# Patient Record
Sex: FEMALE | Race: WHITE | Employment: FULL TIME | URBAN - NONMETROPOLITAN AREA
[De-identification: names, ages, dates, MRNs, and addresses within clinical notes are randomized per-mention and may not be internally consistent; named-entity substitution may affect disease eponyms.]

---

## 2021-06-15 ENCOUNTER — APPOINTMENT (OUTPATIENT)
Dept: GENERAL RADIOLOGY | Age: 36
End: 2021-06-15
Payer: COMMERCIAL

## 2021-06-15 ENCOUNTER — HOSPITAL ENCOUNTER (EMERGENCY)
Age: 36
Discharge: HOME | End: 2021-06-15
Attending: EMERGENCY MEDICINE
Payer: COMMERCIAL

## 2021-06-15 VITALS
OXYGEN SATURATION: 98 % | WEIGHT: 255 LBS | HEART RATE: 89 BPM | HEIGHT: 69 IN | BODY MASS INDEX: 37.77 KG/M2 | TEMPERATURE: 98.1 F | DIASTOLIC BLOOD PRESSURE: 61 MMHG | SYSTOLIC BLOOD PRESSURE: 105 MMHG | RESPIRATION RATE: 20 BRPM

## 2021-06-15 DIAGNOSIS — R07.9 ACUTE CHEST PAIN: Primary | ICD-10-CM

## 2021-06-15 DIAGNOSIS — R07.89 ATYPICAL CHEST PAIN: ICD-10-CM

## 2021-06-15 DIAGNOSIS — R07.89 CHEST WALL PAIN: ICD-10-CM

## 2021-06-15 LAB
ALBUMIN SERPL-MCNC: 4.1 G/DL (ref 3.5–5.2)
ALP BLD-CCNC: 79 U/L (ref 35–104)
ALT SERPL-CCNC: 16 U/L (ref 5–33)
AMPHETAMINE SCREEN, URINE: NEGATIVE
ANION GAP SERPL CALCULATED.3IONS-SCNC: 12 MMOL/L (ref 7–19)
AST SERPL-CCNC: 17 U/L (ref 5–32)
BARBITURATE SCREEN URINE: NEGATIVE
BASOPHILS ABSOLUTE: 0.1 K/UL (ref 0–0.2)
BASOPHILS RELATIVE PERCENT: 0.8 % (ref 0–1)
BENZODIAZEPINE SCREEN, URINE: NEGATIVE
BILIRUB SERPL-MCNC: <0.2 MG/DL (ref 0.2–1.2)
BUN BLDV-MCNC: 8 MG/DL (ref 6–20)
CALCIUM SERPL-MCNC: 9.3 MG/DL (ref 8.6–10)
CANNABINOID SCREEN URINE: NEGATIVE
CHLORIDE BLD-SCNC: 101 MMOL/L (ref 98–111)
CO2: 27 MMOL/L (ref 22–29)
COCAINE METABOLITE SCREEN URINE: NEGATIVE
CREAT SERPL-MCNC: 0.7 MG/DL (ref 0.5–0.9)
EKG P AXIS: 23 DEGREES
EKG P AXIS: 29 DEGREES
EKG P-R INTERVAL: 130 MS
EKG P-R INTERVAL: 130 MS
EKG Q-T INTERVAL: 384 MS
EKG Q-T INTERVAL: 386 MS
EKG QRS DURATION: 92 MS
EKG QRS DURATION: 94 MS
EKG QTC CALCULATION (BAZETT): 420 MS
EKG QTC CALCULATION (BAZETT): 431 MS
EKG T AXIS: 1 DEGREES
EKG T AXIS: 9 DEGREES
EOSINOPHILS ABSOLUTE: 0.4 K/UL (ref 0–0.6)
EOSINOPHILS RELATIVE PERCENT: 4.7 % (ref 0–5)
GFR AFRICAN AMERICAN: >59
GFR NON-AFRICAN AMERICAN: >60
GLUCOSE BLD-MCNC: 119 MG/DL (ref 74–109)
HCG QUALITATIVE: NEGATIVE
HCT VFR BLD CALC: 37 % (ref 37–47)
HEMOGLOBIN: 12.3 G/DL (ref 12–16)
IMMATURE GRANULOCYTES #: 0 K/UL
LYMPHOCYTES ABSOLUTE: 3.7 K/UL (ref 1.1–4.5)
LYMPHOCYTES RELATIVE PERCENT: 41.8 % (ref 20–40)
Lab: NORMAL
MCH RBC QN AUTO: 29.3 PG (ref 27–31)
MCHC RBC AUTO-ENTMCNC: 33.2 G/DL (ref 33–37)
MCV RBC AUTO: 88.1 FL (ref 81–99)
MONOCYTES ABSOLUTE: 0.4 K/UL (ref 0–0.9)
MONOCYTES RELATIVE PERCENT: 4.9 % (ref 0–10)
NEUTROPHILS ABSOLUTE: 4.2 K/UL (ref 1.5–7.5)
NEUTROPHILS RELATIVE PERCENT: 47.6 % (ref 50–65)
OPIATE SCREEN URINE: NEGATIVE
PDW BLD-RTO: 12.5 % (ref 11.5–14.5)
PLATELET # BLD: 231 K/UL (ref 130–400)
PMV BLD AUTO: 9.6 FL (ref 9.4–12.3)
POTASSIUM SERPL-SCNC: 3.7 MMOL/L (ref 3.5–5)
RBC # BLD: 4.2 M/UL (ref 4.2–5.4)
SODIUM BLD-SCNC: 140 MMOL/L (ref 136–145)
TOTAL PROTEIN: 7.2 G/DL (ref 6.6–8.7)
TROPONIN: <0.01 NG/ML (ref 0–0.03)
TROPONIN: <0.01 NG/ML (ref 0–0.03)
WBC # BLD: 8.9 K/UL (ref 4.8–10.8)

## 2021-06-15 PROCEDURE — 80053 COMPREHEN METABOLIC PANEL: CPT

## 2021-06-15 PROCEDURE — 71045 X-RAY EXAM CHEST 1 VIEW: CPT

## 2021-06-15 PROCEDURE — 84703 CHORIONIC GONADOTROPIN ASSAY: CPT

## 2021-06-15 PROCEDURE — 85025 COMPLETE CBC W/AUTO DIFF WBC: CPT

## 2021-06-15 PROCEDURE — 80307 DRUG TEST PRSMV CHEM ANLYZR: CPT

## 2021-06-15 PROCEDURE — 6360000002 HC RX W HCPCS: Performed by: EMERGENCY MEDICINE

## 2021-06-15 PROCEDURE — 84484 ASSAY OF TROPONIN QUANT: CPT

## 2021-06-15 PROCEDURE — 36415 COLL VENOUS BLD VENIPUNCTURE: CPT

## 2021-06-15 PROCEDURE — 96374 THER/PROPH/DIAG INJ IV PUSH: CPT

## 2021-06-15 PROCEDURE — 99285 EMERGENCY DEPT VISIT HI MDM: CPT

## 2021-06-15 PROCEDURE — 93010 ELECTROCARDIOGRAM REPORT: CPT | Performed by: INTERNAL MEDICINE

## 2021-06-15 PROCEDURE — 93005 ELECTROCARDIOGRAM TRACING: CPT | Performed by: EMERGENCY MEDICINE

## 2021-06-15 RX ORDER — AMITRIPTYLINE HYDROCHLORIDE 10 MG/1
10 TABLET, FILM COATED ORAL NIGHTLY
COMMUNITY
End: 2022-03-04 | Stop reason: SDUPTHER

## 2021-06-15 RX ORDER — LOSARTAN POTASSIUM 25 MG/1
25 TABLET ORAL DAILY
COMMUNITY
End: 2021-12-12 | Stop reason: SDUPTHER

## 2021-06-15 RX ORDER — KETOROLAC TROMETHAMINE 30 MG/ML
30 INJECTION, SOLUTION INTRAMUSCULAR; INTRAVENOUS ONCE
Status: COMPLETED | OUTPATIENT
Start: 2021-06-15 | End: 2021-06-15

## 2021-06-15 RX ORDER — SUMATRIPTAN 25 MG/1
25 TABLET, FILM COATED ORAL
COMMUNITY
End: 2022-01-17 | Stop reason: SDUPTHER

## 2021-06-15 RX ADMIN — KETOROLAC TROMETHAMINE 30 MG: 30 INJECTION, SOLUTION INTRAMUSCULAR at 04:59

## 2021-06-15 ASSESSMENT — PAIN DESCRIPTION - LOCATION: LOCATION: CHEST

## 2021-06-15 ASSESSMENT — PAIN SCALES - GENERAL
PAINLEVEL_OUTOF10: 3
PAINLEVEL_OUTOF10: 4

## 2021-06-15 ASSESSMENT — PAIN DESCRIPTION - DESCRIPTORS: DESCRIPTORS: DULL

## 2021-06-15 ASSESSMENT — ENCOUNTER SYMPTOMS
VOMITING: 0
ABDOMINAL PAIN: 0
BACK PAIN: 0
COUGH: 0
SHORTNESS OF BREATH: 0
DIARRHEA: 0
SORE THROAT: 0
RHINORRHEA: 0
NAUSEA: 0

## 2021-06-15 NOTE — ED NOTES
Patient states that she started having chest pain tonight at 2200. Pt states that she was laying in bed when she started having pain. Pt states that the pain does not radiate anywhere. Pt states pain worsens upon palpation.       Roberta Breaux RN  06/15/21 0451

## 2021-06-15 NOTE — ED PROVIDER NOTES
[facility] EMERGENCY DEPT  eMERGENCY dEPARTMENT eNCOUnter      Pt Name: Annie Hurtado  MRN: [id number]  [place] 1985  Date of evaluation: 6/15/2021  Provider: Cuauhtemoc Ortiz MD    [address] [place] [address]       Chief Complaint   Patient presents with    Chest Pain     midsternal chest pain that started at 2200. HISTORY OF PRESENT ILLNESS   (Location/Symptom, Timing/Onset,Context/Setting, Quality, Duration, Modifying Factors, Severity)  Note limiting factors. Annie Hurtado is a 30 y.o. female who presents to the emergency department for chest pain. The patient states that while she was in bed around 10 PM she noticed dull chest discomfort just to the left of her sternum. Pain is nonradiating. She has also felt somewhat dizzy like she was going to pass out and thought her heart was racing. Her pain has been constant and initially was able to get to sleep but then woke and continued to have symptoms. She got online and began reading about chest pain which made her more concerned and she decided to come in for evaluation. No exertional symptoms or diaphoresis. No shortness of breath. Denies any leg swelling. She has no prior cardiac history. She is a non-smoker. Has a history of hypertension and has had some borderline hyperlipidemia but not on any medication for this. No history of diabetes. Denies any family history of early cardiac disease. HPI    NursingNotes were reviewed. REVIEW OF SYSTEMS    (2-9 systems for level 4, 10 or more for level 5)     Review of Systems   Constitutional: Negative for chills, diaphoresis and fever. HENT: Negative for rhinorrhea and sore throat. Respiratory: Negative for cough and shortness of breath. Cardiovascular: Positive for chest pain. Negative for leg swelling. Gastrointestinal: Negative for abdominal pain, diarrhea, nausea and vomiting. Genitourinary: Negative for dysuria, frequency and urgency. Musculoskeletal: Negative for back pain and neck pain. Neurological: Positive for dizziness. Negative for syncope, facial asymmetry, speech difficulty, weakness, numbness and headaches. All other systems reviewed and are negative. PAST MEDICALHISTORY     Past Medical History:   Diagnosis Date    Hyperlipidemia     Hypertension     IBS (irritable bowel syndrome)     Migraines          SURGICAL HISTORY       Past Surgical History:   Procedure Laterality Date    UPPER GASTROINTESTINAL ENDOSCOPY      WISDOM TOOTH EXTRACTION           CURRENT MEDICATIONS     Discharge Medication List as of 6/15/2021  5:23 AM      CONTINUE these medications which have NOT CHANGED    Details   Norgestim-Eth Estrad Triphasic (ORTHO TRI-CYCLEN LO PO) Take by mouthHistorical Med      losartan (COZAAR) 25 MG tablet Take 25 mg by mouth dailyHistorical Med      amitriptyline (ELAVIL) 10 MG tablet Take 10 mg by mouth nightlyHistorical Med      SUMAtriptan (IMITREX) 25 MG tablet Take 25 mg by mouth once as needed for MigraineHistorical Med             ALLERGIES     Propofol    FAMILY HISTORY     History reviewed. No pertinent family history. SOCIAL HISTORY       Social History     Socioeconomic History    Marital status: Single     Spouse name: None    Number of children: None    Years of education: None    Highest education level: None   Occupational History    None   Tobacco Use    Smoking status: Never Smoker    Smokeless tobacco: Never Used   Vaping Use    Vaping Use: Never used   Substance and Sexual Activity    Alcohol use: Yes     Comment: occasion    Drug use: Never    Sexual activity: None   Other Topics Concern    None   Social History Narrative    None     Social Determinants of Health     Financial Resource Strain:     Difficulty of Paying Living Expenses:    Food Insecurity:     Worried About Running Out of Food in the Last Year:     Ran Out of Food in the Last Year:    Transportation Needs:     Lack of Transportation (Medical):      Lack of Chest:      Chest wall: Tenderness present. Abdominal:      Palpations: Abdomen is soft. There is no mass. Tenderness: There is no abdominal tenderness. Musculoskeletal:         General: Normal range of motion. Cervical back: Normal range of motion. Right lower leg: No edema. Left lower leg: No edema. Skin:     General: Skin is warm and dry. Neurological:      Mental Status: She is alert and oriented to person, place, and time. GCS: GCS eye subscore is 4. GCS verbal subscore is 5. GCS motor subscore is 6. DIAGNOSTIC RESULTS     EKG: All EKG's areinterpreted by the Emergency Department Physician who either signs or Co-signs this chart in the absence of a cardiologist.    81 normal sinus rhythm, 1 beat of ST depression in lateral leads but then goes back to baseline likely artifact isolated T wave inversion lead III, nondiagnostic EKG, no old EKG for comparison    86 normal sinus rhythm,  nondiagnostic EKG    RADIOLOGY:  Non-plain film images such as CT, Ultrasound and MRI are read by the radiologist. Plain radiographic images are visualized and preliminarily interpreted bythe emergency physician with the below findings:      XR CHEST PORTABLE   Final Result   1. No radiographic evidence of acute cardiopulmonary process. Signed by Dr Renae Meyers        negative      LABS:  Labs Reviewed   CBC WITH AUTO DIFFERENTIAL - Abnormal; Notable for the following components:       Result Value    Neutrophils % 47.6 (*)     Lymphocytes % 41.8 (*)     All other components within normal limits   COMPREHENSIVE METABOLIC PANEL - Abnormal; Notable for the following components:    Glucose 119 (*)     All other components within normal limits   TROPONIN   HCG, SERUM, QUALITATIVE   URINE DRUG SCREEN   TROPONIN       All other labs were within normal range or not returned as of this dictation.     EMERGENCY DEPARTMENT COURSE and DIFFERENTIAL DIAGNOSIS/MDM:   Vitals:    Vitals:    06/15/21 77 196 003 06/15/21 0255 06/15/21 0533   BP: (!) 135/98  105/61   Pulse: 89 92 89   Resp: 20  20   Temp: 98.1 °F (36.7 °C)     TempSrc: Oral     SpO2: 96%  98%   Weight: 255 lb (115.7 kg)     Height: 5' 9\" (1.753 m)         MDM  Number of Diagnoses or Management Options     Amount and/or Complexity of Data Reviewed  Clinical lab tests: ordered and reviewed  Tests in the radiology section of CPT®: ordered and reviewed  Independent visualization of images, tracings, or specimens: yes      VSS, on OCP but hx and vitals not suggestive of PE, non exertional cp that started while in bed, began reading online and became more concerned, pain has been constant since around 2200,  reproducible on exam, seems atypical for angina as well,  will check labs, xray ekg cont to monitor 0318    Trop neg x2, pt feeling better, suspect some underlying anxiety and cp reproducible on exam, stable for DC and outpt follow up, understands return precautions        CONSULTS:  None    PROCEDURES:  Unless otherwise noted below, none     Procedures    FINAL IMPRESSION      1. Acute chest pain    2. Atypical chest pain    3.  Chest wall pain          DISPOSITION/PLAN   DISPOSITION        PATIENT REFERRED TO:  [address] [place] [address] [facility]  [phone]    As needed, If symptoms worsen    [facility]  [address] [place] [id number] [phone]  Schedule an appointment as soon as possible for a visit in 1 week        DISCHARGE MEDICATIONS:  Discharge Medication List as of 6/15/2021  5:23 AM             (Please note that portions of this note were completed with a voice recognition program.  Efforts were made to edit thedictations but occasionally words are mis-transcribed.)    Gurvinder Hughes MD (electronically signed)  Attending Emergency Physician        Isaias Rivera MD  06/15/21 4359

## 2021-09-10 ENCOUNTER — OFFICE VISIT (OUTPATIENT)
Dept: FAMILY MEDICINE CLINIC | Age: 36
End: 2021-09-10
Payer: COMMERCIAL

## 2021-09-10 VITALS
WEIGHT: 254.8 LBS | DIASTOLIC BLOOD PRESSURE: 72 MMHG | HEIGHT: 69 IN | TEMPERATURE: 97.2 F | SYSTOLIC BLOOD PRESSURE: 126 MMHG | OXYGEN SATURATION: 98 % | BODY MASS INDEX: 37.74 KG/M2 | HEART RATE: 83 BPM

## 2021-09-10 DIAGNOSIS — N92.1 MENORRHAGIA WITH IRREGULAR CYCLE: ICD-10-CM

## 2021-09-10 DIAGNOSIS — I10 ESSENTIAL HYPERTENSION: ICD-10-CM

## 2021-09-10 DIAGNOSIS — Z76.89 ENCOUNTER TO ESTABLISH CARE: ICD-10-CM

## 2021-09-10 DIAGNOSIS — R53.83 FATIGUE, UNSPECIFIED TYPE: ICD-10-CM

## 2021-09-10 DIAGNOSIS — F41.9 ANXIETY: Primary | ICD-10-CM

## 2021-09-10 DIAGNOSIS — G43.709 CHRONIC MIGRAINE WITHOUT AURA WITHOUT STATUS MIGRAINOSUS, NOT INTRACTABLE: ICD-10-CM

## 2021-09-10 DIAGNOSIS — K58.0 IRRITABLE BOWEL SYNDROME WITH DIARRHEA: ICD-10-CM

## 2021-09-10 DIAGNOSIS — B36.9 FUNGAL SKIN INFECTION: ICD-10-CM

## 2021-09-10 DIAGNOSIS — D22.9 SKIN MOLE: ICD-10-CM

## 2021-09-10 DIAGNOSIS — E78.2 MIXED HYPERLIPIDEMIA: ICD-10-CM

## 2021-09-10 PROCEDURE — 99204 OFFICE O/P NEW MOD 45 MIN: CPT | Performed by: NURSE PRACTITIONER

## 2021-09-10 RX ORDER — BUPROPION HYDROCHLORIDE 150 MG/1
150 TABLET ORAL EVERY MORNING
Qty: 30 TABLET | Refills: 5 | Status: SHIPPED | OUTPATIENT
Start: 2021-09-10 | End: 2022-03-25

## 2021-09-10 RX ORDER — ESCITALOPRAM OXALATE 10 MG/1
10 TABLET ORAL DAILY
COMMUNITY
End: 2021-09-10 | Stop reason: ALTCHOICE

## 2021-09-10 RX ORDER — DICYCLOMINE HYDROCHLORIDE 10 MG/1
10 CAPSULE ORAL 3 TIMES DAILY PRN
Qty: 90 CAPSULE | Refills: 3 | Status: SHIPPED | OUTPATIENT
Start: 2021-09-10 | End: 2021-12-03

## 2021-09-10 RX ORDER — CLOTRIMAZOLE 1 %
CREAM (GRAM) TOPICAL
Qty: 30 G | Refills: 0 | Status: SHIPPED | OUTPATIENT
Start: 2021-09-10 | End: 2021-09-17

## 2021-09-10 RX ORDER — FLUCONAZOLE 100 MG/1
100 TABLET ORAL DAILY
Qty: 5 TABLET | Refills: 0 | Status: SHIPPED | OUTPATIENT
Start: 2021-09-10 | End: 2021-09-15

## 2021-09-10 SDOH — ECONOMIC STABILITY: FOOD INSECURITY: WITHIN THE PAST 12 MONTHS, THE FOOD YOU BOUGHT JUST DIDN'T LAST AND YOU DIDN'T HAVE MONEY TO GET MORE.: NEVER TRUE

## 2021-09-10 SDOH — ECONOMIC STABILITY: FOOD INSECURITY: WITHIN THE PAST 12 MONTHS, YOU WORRIED THAT YOUR FOOD WOULD RUN OUT BEFORE YOU GOT MONEY TO BUY MORE.: NEVER TRUE

## 2021-09-10 ASSESSMENT — ENCOUNTER SYMPTOMS
ABDOMINAL PAIN: 1
DIARRHEA: 1
SHORTNESS OF BREATH: 0
WHEEZING: 0
NAUSEA: 0
COUGH: 0
VOMITING: 0
SORE THROAT: 0
CHEST TIGHTNESS: 0

## 2021-09-10 ASSESSMENT — SOCIAL DETERMINANTS OF HEALTH (SDOH): HOW HARD IS IT FOR YOU TO PAY FOR THE VERY BASICS LIKE FOOD, HOUSING, MEDICAL CARE, AND HEATING?: NOT HARD AT ALL

## 2021-09-10 ASSESSMENT — PATIENT HEALTH QUESTIONNAIRE - PHQ9
SUM OF ALL RESPONSES TO PHQ QUESTIONS 1-9: 0
2. FEELING DOWN, DEPRESSED OR HOPELESS: 0
1. LITTLE INTEREST OR PLEASURE IN DOING THINGS: 0
SUM OF ALL RESPONSES TO PHQ QUESTIONS 1-9: 0
SUM OF ALL RESPONSES TO PHQ QUESTIONS 1-9: 0
SUM OF ALL RESPONSES TO PHQ9 QUESTIONS 1 & 2: 0

## 2021-09-10 NOTE — PROGRESS NOTES
Yahaira Samayoa is a 30 y.o. female who presents today for  Chief Complaint   Patient presents with    New Patient     would like to check labs for cholesterol, and vitamin d and vitamin b     Fatigue     wants to check her thyroid due to fatigue and weight gain     Mole     has moles and would like to see a dermatologist and would also like to see a allergist     Abdominal Pain     stomach hurts and has hurt for many years, worsened when she eats and has diarrhea at times     Menstrual Problem     periods have been lasting 2 weeks     Arm Problem     both armpits are itchy and have some redness which started 3 weeks ago        HPI:  Here to establish care. Moved from [place]. She has had chronic fatigue. She is concerned about progressive weight gain which has been ongoing for years. She was recently diagnosed with hypertension and hyperlipidemia. She is taking losartan and tolerating well. Has not checked blood pressure. She was not prescribed any cholesterol medication. She has chronic migraines which have been stable. She is taking amitriptyline for this and IBS, tolerates well. She has not required sumatriptan recently. Has been taking maybe once a month. Migraines are typically hormonal around menses and triggered by weather changes. She has chronic intermittent abdominal pain. She was diagnosed with IBS at age 26. She has had 2 EGDs and 2 colonoscopies in the past. Last colonoscopy was 1 year ago. Stress induces symptoms. She gets stabbing pain, diarrhea. She has tried dicyclomine in the past, cannot recall why it was stopped. She was previously taking Lexapro for anxiety. Has not had since March. Recently refilled but has not started. Concerned with weight gain with this. She has had painful heavy periods for the past 6 months. Bleeding lasts 2 weeks. She was started on OCP at age 14 and has taken ever since. Currently on period.   Last Pap was last year, normal.    She has several moles and would like to see a dermatologist. Some have gotten bigger, darker. She has had redness and itching to her axillae for the past couple of weeks. No change in deodorant or soaps. She has tried hydrocortisone cream with no improvement. She took an old fluconazole with no improvement. Review of Systems   Constitutional: Negative for chills and fever. HENT: Negative for congestion, ear pain and sore throat. Respiratory: Negative for cough, chest tightness, shortness of breath and wheezing. Cardiovascular: Negative for chest pain and palpitations. Gastrointestinal: Positive for abdominal pain (IBS, see HPI) and diarrhea (see HPI). Negative for nausea and vomiting. Musculoskeletal: Negative for arthralgias and myalgias. Skin: Negative for rash. Skin moles; axillary redness   Neurological: Positive for headaches (migraines, stable). Psychiatric/Behavioral: Negative for dysphoric mood and suicidal ideas. The patient is nervous/anxious. Past Medical History:   Diagnosis Date    Hyperlipidemia     Hypertension     IBS (irritable bowel syndrome)     Migraines        Current Outpatient Medications   Medication Sig Dispense Refill    buPROPion (WELLBUTRIN XL) 150 MG extended release tablet Take 1 tablet by mouth every morning 30 tablet 5    clotrimazole (LOTRIMIN) 1 % cream Apply topically 2 times daily.  30 g 0    fluconazole (DIFLUCAN) 100 MG tablet Take 1 tablet by mouth daily for 5 days 5 tablet 0    dicyclomine (BENTYL) 10 MG capsule Take 1 capsule by mouth 3 times daily as needed (abdominal cramping) 90 capsule 3    Norgestim-Eth Estrad Triphasic (ORTHO TRI-CYCLEN LO PO) Take by mouth      losartan (COZAAR) 25 MG tablet Take 25 mg by mouth daily      amitriptyline (ELAVIL) 10 MG tablet Take 10 mg by mouth nightly      SUMAtriptan (IMITREX) 25 MG tablet Take 25 mg by mouth once as needed for Migraine       No current facility-administered medications for this visit. Allergies   Allergen Reactions    Propofol Nausea And Vomiting       Past Surgical History:   Procedure Laterality Date    KNEE ARTHROSCOPY Left     UPPER GASTROINTESTINAL ENDOSCOPY      WISDOM TOOTH EXTRACTION         Social History     Tobacco Use    Smoking status: Never Smoker    Smokeless tobacco: Never Used   Vaping Use    Vaping Use: Never used   Substance Use Topics    Alcohol use: Yes     Comment: occasion    Drug use: Never       Family History   Problem Relation Age of Onset    Migraines Mother     Hypertension Father     Lung Cancer Maternal Grandfather     Migraines Paternal Grandmother     Cancer Paternal Grandfather         mouth       /72   Pulse 83   Temp 97.2 °F (36.2 °C)   Ht 5' 9\" (1.753 m)   Wt 254 lb 12.8 oz (115.6 kg)   SpO2 98%   BMI 37.63 kg/m²     Physical Exam  Vitals reviewed. Constitutional:       General: She is not in acute distress. Appearance: Normal appearance. She is well-developed. HENT:      Head: Normocephalic. Eyes:      Conjunctiva/sclera: Conjunctivae normal.      Pupils: Pupils are equal, round, and reactive to light. Neck:      Thyroid: No thyromegaly. Vascular: No carotid bruit or JVD. Trachea: No tracheal deviation. Cardiovascular:      Rate and Rhythm: Normal rate and regular rhythm. Heart sounds: Normal heart sounds. No murmur heard. Pulmonary:      Effort: Pulmonary effort is normal. No respiratory distress. Breath sounds: Normal breath sounds. No wheezing or rhonchi. Abdominal:      General: Abdomen is flat. Bowel sounds are normal. There is no distension. Palpations: Abdomen is soft. There is no mass. Tenderness: There is abdominal tenderness (mild tenderness mid abdomen; no rebound or mass). There is no guarding or rebound. Hernia: No hernia is present. Musculoskeletal:         General: Normal range of motion.       Cervical back: Normal range of motion and neck supple. Lymphadenopathy:      Cervical: No cervical adenopathy. Skin:     General: Skin is warm and dry. Findings: Rash present. Comments: Axillae with confluent erythema, no discrete lesions   Neurological:      General: No focal deficit present. Mental Status: She is alert. Psychiatric:         Mood and Affect: Mood normal.         Behavior: Behavior normal.         Thought Content: Thought content normal.         ASSESSMENT/PLAN:  1. Anxiety  -Start bupropion  mg daily. SE profile reviewed. -Advised to stop Lexapro. -Reassess in 1 month. - buPROPion (WELLBUTRIN XL) 150 MG extended release tablet; Take 1 tablet by mouth every morning  Dispense: 30 tablet; Refill: 5    2. Essential hypertension  -Stable, controlled. Continue losartan at current dose. 3. Fatigue, unspecified type  -May be multifactorial, check lab as outlined.  -Discussed possible sleep study. She does have a history of snoring.  - CBC Auto Differential; Future  - TSH without Reflex; Future  - T4, Free; Future  - Vitamin D 25 Hydroxy; Future  - Vitamin B12; Future  - Ferritin; Future  - Iron and TIBC; Future    4. Mixed hyperlipidemia  -Check fasting CMP, lipid  - Comprehensive Metabolic Panel; Future  - Lipid Panel; Future    5. Menorrhagia with irregular cycle  -Refer to GYN for evaluation and recommendations  - Roxana 77, Baldo [address], NP, OB/GYN, [place]    6. Skin mole  -Referred to dermatology, order given to patient. She will schedule. - External Referral To Dermatology    7. Fungal skin infection  -Fluconazole 100 mg daily x5 days  -Clotrimazole twice daily, advised that she may need to use for 2 to 3 weeks. 8. Chronic migraine without aura without status migrainosus, not intractable  -Stable, controlled. Continue amitriptyline and sumatriptan as needed. 9. Irritable bowel syndrome with diarrhea  -Dicyclomine 10 mg 3 times daily as needed  -Continue amitriptyline  -May need GI referral    10.  Encounter to establish care           Return in about 1 month (around 10/10/2021) for follow up, 30 minute visit. Marichuy Melvin was seen today for new patient, fatigue, mole, abdominal pain, menstrual problem and arm problem. Diagnoses and all orders for this visit:    Anxiety  -     buPROPion (WELLBUTRIN XL) 150 MG extended release tablet; Take 1 tablet by mouth every morning    Essential hypertension    Fatigue, unspecified type  -     CBC Auto Differential; Future  -     TSH without Reflex; Future  -     T4, Free; Future  -     Vitamin D 25 Hydroxy; Future  -     Vitamin B12; Future  -     Ferritin; Future  -     Iron and TIBC; Future    Mixed hyperlipidemia  -     Comprehensive Metabolic Panel; Future  -     Lipid Panel; Future    Menorrhagia with irregular cycle  -     Zahra Driver, NP, OB/GYN, [facility]    Skin mole  -     External Referral To Dermatology    Fungal skin infection    Chronic migraine without aura without status migrainosus, not intractable    Irritable bowel syndrome with diarrhea    Encounter to establish care    Other orders  -     clotrimazole (LOTRIMIN) 1 % cream; Apply topically 2 times daily. -     fluconazole (DIFLUCAN) 100 MG tablet; Take 1 tablet by mouth daily for 5 days  -     dicyclomine (BENTYL) 10 MG capsule; Take 1 capsule by mouth 3 times daily as needed (abdominal cramping)      Medications Discontinued During This Encounter   Medication Reason    escitalopram (LEXAPRO) 10 MG tablet Alternate therapy     Patient Instructions   We are committed to providing you with the best care possible. In order to help us achieve these goals please remember to bring all medications, herbal products, and over the counter supplements with you to each visit. If your provider has ordered testing for you, please be sure to follow up with our office if you have not received results within 7 days after the testing took place.      *If you receive a survey after visiting one of our offices, please take time to share your experience concerning your physician office visit. These surveys are confidential and no health information about you is shared. We are eager to improve for you and we are counting on your feedback to help make that happen. Patient voicesunderstanding and agrees to plans along with risks and benefits of plan. Counseling:  Gaudencioalejandra Pastrana Putnam's case, medications and options were discussed in detail. Patient was instructed to call the office if she questionsregarding her treatment. Should her conditions worsen, she should return to office to be reassessed by ZEESHAN Fournier. she Should to go the closest Emergency Department for any emergency. They verbalizedunderstanding the above instructions. Return in about 1 month (around 10/10/2021) for follow up, 30 minute visit.

## 2021-09-10 NOTE — PATIENT INSTRUCTIONS
We are committed to providing you with the best care possible. In order to help us achieve these goals please remember to bring all medications, herbal products, and over the counter supplements with you to each visit. If your provider has ordered testing for you, please be sure to follow up with our office if you have not received results within 7 days after the testing took place. *If you receive a survey after visiting one of our offices, please take time to share your experience concerning your physician office visit. These surveys are confidential and no health information about you is shared. We are eager to improve for you and we are counting on your feedback to help make that happen. Rosenda Miller)

## 2021-10-05 ENCOUNTER — HOSPITAL ENCOUNTER (OUTPATIENT)
Dept: GENERAL RADIOLOGY | Age: 36
Discharge: HOME | End: 2021-10-05
Payer: COMMERCIAL

## 2021-10-05 ENCOUNTER — OFFICE VISIT (OUTPATIENT)
Dept: FAMILY MEDICINE CLINIC | Age: 36
End: 2021-10-05
Payer: COMMERCIAL

## 2021-10-05 VITALS
WEIGHT: 254 LBS | OXYGEN SATURATION: 98 % | BODY MASS INDEX: 37.62 KG/M2 | TEMPERATURE: 97.5 F | DIASTOLIC BLOOD PRESSURE: 82 MMHG | HEART RATE: 113 BPM | RESPIRATION RATE: 18 BRPM | HEIGHT: 69 IN | SYSTOLIC BLOOD PRESSURE: 116 MMHG

## 2021-10-05 DIAGNOSIS — L30.9 DERMATITIS: ICD-10-CM

## 2021-10-05 DIAGNOSIS — E78.2 MIXED HYPERLIPIDEMIA: ICD-10-CM

## 2021-10-05 DIAGNOSIS — M54.42 CHRONIC LEFT-SIDED LOW BACK PAIN WITH LEFT-SIDED SCIATICA: ICD-10-CM

## 2021-10-05 DIAGNOSIS — G89.29 CHRONIC LEFT-SIDED LOW BACK PAIN WITH LEFT-SIDED SCIATICA: Primary | ICD-10-CM

## 2021-10-05 DIAGNOSIS — M54.42 CHRONIC LEFT-SIDED LOW BACK PAIN WITH LEFT-SIDED SCIATICA: Primary | ICD-10-CM

## 2021-10-05 DIAGNOSIS — G89.29 CHRONIC LEFT-SIDED LOW BACK PAIN WITH LEFT-SIDED SCIATICA: ICD-10-CM

## 2021-10-05 DIAGNOSIS — I10 PRIMARY HYPERTENSION: ICD-10-CM

## 2021-10-05 DIAGNOSIS — F41.9 ANXIETY: ICD-10-CM

## 2021-10-05 PROCEDURE — 72100 X-RAY EXAM L-S SPINE 2/3 VWS: CPT

## 2021-10-05 PROCEDURE — 99214 OFFICE O/P EST MOD 30 MIN: CPT | Performed by: NURSE PRACTITIONER

## 2021-10-05 PROCEDURE — 96372 THER/PROPH/DIAG INJ SC/IM: CPT | Performed by: NURSE PRACTITIONER

## 2021-10-05 RX ORDER — DEXAMETHASONE SODIUM PHOSPHATE 100 MG/10ML
10 INJECTION INTRAMUSCULAR; INTRAVENOUS ONCE
Status: COMPLETED | OUTPATIENT
Start: 2021-10-05 | End: 2021-10-05

## 2021-10-05 RX ORDER — TIZANIDINE 4 MG/1
4 TABLET ORAL 2 TIMES DAILY PRN
Qty: 30 TABLET | Refills: 1 | Status: SHIPPED | OUTPATIENT
Start: 2021-10-05 | End: 2021-11-04

## 2021-10-05 RX ORDER — TRIAMCINOLONE ACETONIDE 5 MG/G
CREAM TOPICAL
Qty: 30 G | Refills: 0 | Status: SHIPPED | OUTPATIENT
Start: 2021-10-05 | End: 2021-10-12

## 2021-10-05 RX ADMIN — DEXAMETHASONE SODIUM PHOSPHATE 10 MG: 100 INJECTION INTRAMUSCULAR; INTRAVENOUS at 15:27

## 2021-10-05 ASSESSMENT — ENCOUNTER SYMPTOMS
SHORTNESS OF BREATH: 0
CHEST TIGHTNESS: 0
SORE THROAT: 0
DIARRHEA: 0
ABDOMINAL PAIN: 0
COUGH: 0
NAUSEA: 0
BACK PAIN: 1
WHEEZING: 0

## 2021-10-05 NOTE — PROGRESS NOTES
Julien Parkinson is a 30 y.o. female who presents today for  Chief Complaint   Patient presents with    Follow-up       HPI:  She has had a dry throat since starting wellbutrin but is not sure if it is related to her other medications. She had difficulty swallowing due to the dryness. She stopped the dicyclomine and has had a slight improvement. No significant change in anxiety but has been taking it for less than 4 weeks. She has chronic intermittent lower back pain with occasional pain radiating to L leg. Back catches or \"goes out\" at times briefly. Ongoing for years. Over the past month, pain has been constant. Localized to L buttock and radiating down posterolateral L leg. No recent injury. She has tried a massage gun, icy hot, stretches, otc NSAID with no improvement. Treated for axillary fungal infection with fluconazole x 5 days and clotrimazole cream with no improvement. Continues to have significant axillary itching. She tried switching deodorant with no improvement. No other rash. Hypertension  Compliant with medications. No adverse effects from medication. No lightheadedness, palpitations, or chest pain. Labs reviewed. Lipids are elevated, . FBG 92. Vit d 38. B12 389.   Lab Results   Component Value Date     10/05/2021    K 4.7 10/05/2021     10/05/2021    CO2 23 10/05/2021    BUN 7 10/05/2021    CREATININE 0.7 10/05/2021    GLUCOSE 92 10/05/2021    CALCIUM 9.6 10/05/2021    PROT 7.4 10/05/2021    LABALBU 4.4 10/05/2021    BILITOT <0.2 10/05/2021    ALKPHOS 73 10/05/2021    AST 17 10/05/2021    ALT 15 10/05/2021    LABGLOM >60 10/05/2021    GFRAA >59 10/05/2021       Lab Results   Component Value Date    CHOL 239 (H) 10/05/2021     Lab Results   Component Value Date    TRIG 237 (H) 10/05/2021     Lab Results   Component Value Date    HDL 47 (L) 10/05/2021     Lab Results   Component Value Date    LDLCALC 145 10/05/2021     No results found for: LABVLDL, VLDL  No results found for: [facility]  Lab Results   Component Value Date    VITD25 38.0 10/05/2021     Lab Results   Component Value Date    DGECVWAD37 389 10/05/2021     Lab Results   Component Value Date    WBC 7.0 10/05/2021    HGB 12.2 10/05/2021    HCT 38.9 10/05/2021    MCV 89.8 10/05/2021     10/05/2021      Lab Results   Component Value Date    WBC 7.0 10/05/2021    HGB 12.2 10/05/2021    HCT 38.9 10/05/2021    MCV 89.8 10/05/2021     10/05/2021       Review of Systems   Constitutional: Negative for chills and fever. HENT: Negative for congestion, ear pain and sore throat. Respiratory: Negative for cough, chest tightness, shortness of breath and wheezing. Cardiovascular: Negative for chest pain. Gastrointestinal: Negative for abdominal pain, diarrhea and nausea. Musculoskeletal: Positive for back pain. Negative for arthralgias and myalgias. Skin: Negative for rash.         Axillary pruritus, redness       Past Medical History:   Diagnosis Date    Hyperlipidemia     Hypertension     IBS (irritable bowel syndrome)     Migraines        Current Outpatient Medications   Medication Sig Dispense Refill    diclofenac (VOLTAREN) 50 MG EC tablet Take 1 tablet by mouth 2 times daily as needed for Pain 60 tablet 1    tiZANidine (ZANAFLEX) 4 MG tablet Take 1 tablet by mouth 2 times daily as needed (back pain) 30 tablet 1    triamcinolone (ARISTOCORT) 0.5 % cream Apply topically 2 times daily x 14 days or until improved 30 g 0    buPROPion (WELLBUTRIN XL) 150 MG extended release tablet Take 1 tablet by mouth every morning 30 tablet 5    dicyclomine (BENTYL) 10 MG capsule Take 1 capsule by mouth 3 times daily as needed (abdominal cramping) 90 capsule 3    Norgestim-Eth Estrad Triphasic (ORTHO TRI-CYCLEN LO PO) Take by mouth      losartan (COZAAR) 25 MG tablet Take 25 mg by mouth daily      amitriptyline (ELAVIL) 10 MG tablet Take 10 mg by mouth nightly      SUMAtriptan (IMITREX) 25 MG tablet Take 25 mg by mouth once as needed for Migraine       No current facility-administered medications for this visit. Allergies   Allergen Reactions    Propofol Nausea And Vomiting       Past Surgical History:   Procedure Laterality Date    KNEE ARTHROSCOPY Left     UPPER GASTROINTESTINAL ENDOSCOPY      WISDOM TOOTH EXTRACTION         Social History     Tobacco Use    Smoking status: Never Smoker    Smokeless tobacco: Never Used   Vaping Use    Vaping Use: Never used   Substance Use Topics    Alcohol use: Yes     Comment: occasion    Drug use: Never       Family History   Problem Relation Age of Onset   [facility] Migraines Mother     Hypertension Father     Elevated Lipids Father     Lung Cancer Maternal Grandfather     Migraines Paternal Grandmother     Cancer Paternal Grandfather         mouth       /82   Pulse 113   Temp 97.5 °F (36.4 °C) (Temporal)   Resp 18   Ht 5' 9\" (1.753 m)   Wt 254 lb (115.2 kg)   SpO2 98%   BMI 37.51 kg/m²     Physical Exam  Vitals reviewed. Constitutional:       General: She is not in acute distress. Appearance: Normal appearance. She is well-developed. HENT:      Head: Normocephalic. Eyes:      Conjunctiva/sclera: Conjunctivae normal.      Pupils: Pupils are equal, round, and reactive to light. Neck:      Thyroid: No thyromegaly. Vascular: No carotid bruit or JVD. Trachea: No tracheal deviation. Cardiovascular:      Rate and Rhythm: Normal rate and regular rhythm. Heart sounds: Normal heart sounds. No murmur heard. Pulmonary:      Effort: Pulmonary effort is normal. No respiratory distress. Breath sounds: Normal breath sounds. No wheezing or rhonchi. Musculoskeletal:         General: Tenderness present. Normal range of motion. Cervical back: Normal range of motion and neck supple. Comments: Moderate tenderness L sciatic notch. Straight leg exam negative. L hip flexion intact.    Lymphadenopathy: Cervical: No cervical adenopathy. Skin:     General: Skin is warm and dry. Findings: No rash. Neurological:      Mental Status: She is alert. Psychiatric:         Mood and Affect: Mood normal.         Behavior: Behavior normal.         Thought Content: Thought content normal.         ASSESSMENT/PLAN:  1. Anxiety  -Continue wellbutrin at current dose. She will report any continued mouth dryness. 2. Chronic left-sided low back pain with left-sided sciatica  -Lumbar XR  -Dexamethasone 10 mg IM  -Diclofenac 50 mg bid prn. Advised to avoid otc NSAIDs  -Tizanidine 4 mg bid prn. SE profile reviewed. - XR LUMBAR SPINE (2-3 VIEWS); Future  - dexamethasone (DECADRON) injection 10 mg    3. Dermatitis  -Suspect axillary dermatitis since fungal treatment failed. Will try triamcinolone 0.5% bid x 14 days or until improved. Advised to try nonfragranced antiperspirant until improved. 4. Primary hypertension  -Stable, controlled. Continue current medication. 5. Mixed hyperlipidemia  -Discussed working on weight loss, exercise, low fat diet or Mediterranean diet. Return in about 2 months (around 12/5/2021) for 30 minute visit. Case Beard was seen today for follow-up. Diagnoses and all orders for this visit:    Chronic left-sided low back pain with left-sided sciatica  -     XR LUMBAR SPINE (2-3 VIEWS); Future  -     dexamethasone (DECADRON) injection 10 mg    Anxiety    Dermatitis    Primary hypertension    Mixed hyperlipidemia    Other orders  -     Cancel: INFLUENZA, MDCK QUADV, 2 YRS AND OLDER, IM, PF, PREFILL SYR OR SDV, 0.5ML (FLUCELVAX QUADV, PF)  -     diclofenac (VOLTAREN) 50 MG EC tablet; Take 1 tablet by mouth 2 times daily as needed for Pain  -     tiZANidine (ZANAFLEX) 4 MG tablet;  Take 1 tablet by mouth 2 times daily as needed (back pain)  -     triamcinolone (ARISTOCORT) 0.5 % cream; Apply topically 2 times daily x 14 days or until improved      There are no discontinued medications. There are no Patient Instructions on file for this visit. Patient voicesunderstanding and agrees to plans along with risks and benefits of plan. Counseling:  Sarah Putnam's case, medications and options were discussed in detail. Patient was instructed to call the office if she questionsregarding her treatment. Should her conditions worsen, she should return to office to be reassessed by ZEESHAN Parra. she Should to go the closest Emergency Department for any emergency. They verbalizedunderstanding the above instructions. Return in about 2 months (around 12/5/2021) for 30 minute visit.

## 2021-10-05 NOTE — PROGRESS NOTES
Administrations This Visit     dexamethasone (DECADRON) injection 10 mg     Admin Date  10/05/2021  15:27 Action  Given Dose  10 mg Route  IntraMUSCular Site  Ventrogluteal Right Administered By  Theresa Phipps LPN    Ordering Provider: ZEESHAN Saldivar    NDC: 5764-9626-56    Lot#: [id number]    : LEXY    Patient Supplied?: No

## 2021-10-13 ENCOUNTER — PATIENT MESSAGE (OUTPATIENT)
Dept: FAMILY MEDICINE CLINIC | Age: 36
End: 2021-10-13

## 2021-10-13 NOTE — TELEPHONE ENCOUNTER
From: Criss Jerez  To: ZEESHAN Uriostegui  Sent: 10/13/2021 8:45 AM CDT  Subject: Visit Follow-Up Question    Good Morning,     Is there anything else that can be done to help my pain? Its now hurting no matter if Im standing or sitting or laying down. And its hurting worse. The steroid worked for about a 1.5 but after that the pain came back.

## 2021-11-03 DIAGNOSIS — M54.42 CHRONIC LEFT-SIDED LOW BACK PAIN WITH LEFT-SIDED SCIATICA: Primary | ICD-10-CM

## 2021-11-03 DIAGNOSIS — G89.29 CHRONIC LEFT-SIDED LOW BACK PAIN WITH LEFT-SIDED SCIATICA: Primary | ICD-10-CM

## 2021-11-04 RX ORDER — TIZANIDINE 4 MG/1
4 TABLET ORAL 2 TIMES DAILY PRN
Qty: 30 TABLET | Refills: 1 | Status: SHIPPED | OUTPATIENT
Start: 2021-11-04 | End: 2021-11-22

## 2021-11-17 ENCOUNTER — OFFICE VISIT (OUTPATIENT)
Dept: OBGYN CLINIC | Age: 36
End: 2021-11-17
Payer: COMMERCIAL

## 2021-11-17 VITALS
BODY MASS INDEX: 37.92 KG/M2 | HEIGHT: 69 IN | SYSTOLIC BLOOD PRESSURE: 139 MMHG | WEIGHT: 256 LBS | DIASTOLIC BLOOD PRESSURE: 89 MMHG | HEART RATE: 76 BPM

## 2021-11-17 DIAGNOSIS — R63.5 WEIGHT GAIN: ICD-10-CM

## 2021-11-17 DIAGNOSIS — N92.1 MENORRHAGIA WITH IRREGULAR CYCLE: ICD-10-CM

## 2021-11-17 DIAGNOSIS — N92.1 MENORRHAGIA WITH IRREGULAR CYCLE: Primary | ICD-10-CM

## 2021-11-17 LAB
FOLLICLE STIMULATING HORMONE: 7.8 MIU/ML
LUTEINIZING HORMONE: 18.2 MIU/ML
TESTOSTERONE TOTAL: 27.2 NG/DL (ref 8.4–48.1)

## 2021-11-17 PROCEDURE — 99203 OFFICE O/P NEW LOW 30 MIN: CPT | Performed by: NURSE PRACTITIONER

## 2021-11-17 NOTE — PROGRESS NOTES
Pratima Yu is a 29 y.o. female who presents today for her medical conditions/ complaints as noted below. Pratima Yu is c/o of Establish Care and Menstrual Problem        HPI  Patient presents today as a new patient with c/o two periods per month. She states she has been on ocp since she was 16. This has been going on for about a year. Last pap in March of this year. No new sexual partners, normal paps in the past. She has always felt like she had vaginal odor, but results have always been normal.   Had labs done at pcp as well other than hormones. Patient's last menstrual period was 11/01/2021 (approximate). No obstetric history on file.     Past Medical History:   Diagnosis Date    Hyperlipidemia     Hypertension     IBS (irritable bowel syndrome)     Migraines      Past Surgical History:   Procedure Laterality Date    KNEE ARTHROSCOPY Left     UPPER GASTROINTESTINAL ENDOSCOPY      WISDOM TOOTH EXTRACTION       Family History   Problem Relation Age of Onset   [facility] Migraines Mother     Hypertension Father    [facility] Elevated Lipids Father     Lung Cancer Maternal Grandfather     Migraines Paternal Grandmother     Cancer Paternal Grandfather         mouth     Social History     Tobacco Use    Smoking status: Never Smoker    Smokeless tobacco: Never Used   Substance Use Topics    Alcohol use: Yes     Comment: occasion       Current Outpatient Medications   Medication Sig Dispense Refill    cyclobenzaprine (FLEXERIL) 5 MG tablet Take 1 tablet by mouth 2 times daily as needed for Muscle spasms 30 tablet 0    tiZANidine (ZANAFLEX) 4 MG tablet TAKE 1 TABLET BY MOUTH 2 TIMES DAILY AS NEEDED (BACK PAIN) 30 tablet 1    diclofenac (VOLTAREN) 50 MG EC tablet Take 1 tablet by mouth 2 times daily as needed for Pain 60 tablet 1    buPROPion (WELLBUTRIN XL) 150 MG extended release tablet Take 1 tablet by mouth every morning 30 tablet 5    dicyclomine (BENTYL) 10 MG capsule Take 1 capsule by mouth 3 times daily as needed (abdominal cramping) 90 capsule 3    Norgestim-Eth Estrad Triphasic (ORTHO TRI-CYCLEN LO PO) Take by mouth      losartan (COZAAR) 25 MG tablet Take 25 mg by mouth daily      amitriptyline (ELAVIL) 10 MG tablet Take 10 mg by mouth nightly      SUMAtriptan (IMITREX) 25 MG tablet Take 25 mg by mouth once as needed for Migraine       No current facility-administered medications for this visit. Allergies   Allergen Reactions    Propofol Nausea And Vomiting     Vitals:    11/17/21 1511   BP: 139/89   Pulse: 76     Body mass index is 37.8 kg/m². Review of Systems   Constitutional: Positive for unexpected weight change. HENT: Negative. Eyes: Negative. Respiratory: Negative. Cardiovascular: Negative. Gastrointestinal: Negative. Genitourinary: Positive for menstrual problem and vaginal discharge (odor). Negative for difficulty urinating, dyspareunia, dysuria, enuresis, frequency, hematuria, pelvic pain and urgency. Musculoskeletal: Negative. Skin: Negative. Neurological: Negative. Psychiatric/Behavioral: Negative. Physical Exam  Vitals and nursing note reviewed. Constitutional:       General: She is not in acute distress. Appearance: She is well-developed. She is not diaphoretic. HENT:      Head: Normocephalic and atraumatic. Eyes:      Conjunctiva/sclera: Conjunctivae normal.      Pupils: Pupils are equal, round, and reactive to light. Pulmonary:      Effort: Pulmonary effort is normal.   Abdominal:      Tenderness: There is no guarding. Genitourinary:     Comments: propath vaginal swab collected    Musculoskeletal:         General: Normal range of motion. Cervical back: Normal range of motion. Comments: Normal ROM in all 4 extremities; normal gait   Skin:     General: Skin is warm and dry. Neurological:      Mental Status: She is alert and oriented to person, place, and time. Motor: No abnormal muscle tone. Coordination: Coordination normal.   Psychiatric:         Behavior: Behavior normal.          Diagnosis Orders   1. Menorrhagia with irregular cycle  Testosterone    Follicle Stimulating Hormone    Luteinizing Hormone    US NON OB TRANSVAGINAL   2. Weight gain  Testosterone       MEDICATIONS:  No orders of the defined types were placed in this encounter. ORDERS:  Orders Placed This Encounter   Procedures    US NON OB TRANSVAGINAL    Testosterone    Follicle Stimulating Hormone    Luteinizing Hormone       PLAN:  Swab sent  Will check labs and US  Samples of Balcoltra x 3 given with coupon. To call if ready for script. Follow in 3 months  There are no Patient Instructions on file for this visit.

## 2021-11-19 ASSESSMENT — ENCOUNTER SYMPTOMS
EYES NEGATIVE: 1
RESPIRATORY NEGATIVE: 1
GASTROINTESTINAL NEGATIVE: 1

## 2021-11-22 RX ORDER — TIZANIDINE 4 MG/1
4 TABLET ORAL 2 TIMES DAILY PRN
Qty: 30 TABLET | Refills: 1 | Status: SHIPPED | OUTPATIENT
Start: 2021-11-22 | End: 2021-12-09

## 2021-11-22 NOTE — TELEPHONE ENCOUNTER
[place] called requesting a refill of the below medication which has been pended for you:     Requested Prescriptions     Pending Prescriptions Disp Refills    tiZANidine (ZANAFLEX) 4 MG tablet [Pharmacy Med Name: TIZANIDINE HCL 4 MG TABLET] 30 tablet 1     Sig: TAKE 1 TABLET BY MOUTH 2 TIMES DAILY AS NEEDED (BACK PAIN)       Last Appointment Date: 10/5/2021  Next Appointment Date: 12/14/2021    Allergies   Allergen Reactions    Propofol Nausea And Vomiting

## 2021-11-23 ENCOUNTER — HOSPITAL ENCOUNTER (OUTPATIENT)
Dept: PHYSICAL THERAPY | Age: 36
Setting detail: THERAPIES SERIES
Discharge: HOME | End: 2021-11-23
Payer: COMMERCIAL

## 2021-11-23 PROCEDURE — 97110 THERAPEUTIC EXERCISES: CPT

## 2021-11-23 PROCEDURE — 97162 PT EVAL MOD COMPLEX 30 MIN: CPT

## 2021-11-23 ASSESSMENT — PAIN DESCRIPTION - ORIENTATION: ORIENTATION: LEFT

## 2021-11-23 ASSESSMENT — PAIN DESCRIPTION - LOCATION: LOCATION: BACK;LEG

## 2021-11-23 ASSESSMENT — PAIN SCALES - GENERAL: PAINLEVEL_OUTOF10: 8

## 2021-11-23 ASSESSMENT — PAIN DESCRIPTION - PAIN TYPE: TYPE: ACUTE PAIN;CHRONIC PAIN

## 2021-11-23 NOTE — PROGRESS NOTES
Physical Therapy  Initial Assessment  Date: 2021  Patient Name: Jatin Corbett  MRN: [id number]  : 1985     Treatment Diagnosis: Low back pain    Restrictions       Subjective   General  Chart Reviewed: Yes  Patient assessed for rehabilitation services?: Yes  Additional Pertinent Hx: 30 y/o F presents with complaint of low back and LLE pain. PMH includes migraines, HTN, L knee scope  Response To Previous Treatment: Not applicable  Family / Caregiver Present: No  Referring Practitioner: ZEESHAN Avendaño  Referral Date : 10/05/21  Diagnosis: Low back pain  Follows Commands: Within Functional Limits  General Comment  Comments: Recent x-ray was unremarkable  PT Visit Information  PT Insurance Information: BCBS (No precert req'd)  Total # of Visits Approved: 12  Total # of Visits to Date: 1  Plan of Care/Certification Expiration Date: 22  Progress Note Due Date: 21  Subjective  Subjective: Presents with c/o back and LLE pain/radicular symptoms. Back area pain has been chronic \"off and on\" though she has never had the LLE radicular pain prior to this current episode. Complains of pain so severe that she is unable to find any position of comfort and has been unable to sleep. Had a steriod shot which relieved pain for just over a day. Has tried muscle relaxers with minimal relief- notes she feels they just make her sleepy. Pain worsened with transitional movements, especially after being in one position for an extended period. Relieved to a degree by sitting with RLE bent underneath. She denies any specific injury but does state that she had a fall onto her back a few years ago when she was walking her large dog and was pulled suddenly. When asked later, does not recall having back pain before that event.   Pain Screening  Patient Currently in Pain: Yes  Pain Assessment  Pain Assessment: 0-10  Pain Level: 8 (6/10 at best over the past 2 weeks)  Pain Type: Acute pain; Chronic pain  Pain Location: Back; Leg  Pain Orientation: Left  Pain Descriptors: Cramping; Numbness; Radiating; Tingling  Vital Signs  Patient Currently in Pain: Yes    Vision/Hearing  Vision  Vision: Within Functional Limits  Hearing  Hearing: Within functional limits    Orientation  Orientation  Overall Orientation Status: Within Normal Limits    Social/Functional History  Social/Functional History  ADL Assistance: Independent (Notes that she has been wearing slip-on shoes to avoid dealing with socks/laces)  Ambulation Assistance: Independent  Active : Yes  Occupation: Full time employment  Type of occupation: Youth services director at [employer]    Objective     Observation/Palpation  Palpation: Mild increase of muscle tension in lumbar paraspinals and glutes, though denies TTP  Observation: In supine, LLE 1\" longer than R with L ASIS anteriorly rotated. Sits with weight shift to R.    Spine  Lumbar: 20 degrees flexion, 18 degrees extension, 15 degrees SB bilat, 100% rotation bilaterally    Strength RLE  R Hip Flexion: 5/5  R Hip ABduction: 5/5  R Hip ADduction: 5/5  R Knee Flexion: 5/5  R Knee Extension: 5/5  R Ankle Dorsiflexion: 5/5  R Ankle Plantar flexion: 5/5  Strength LLE  L Hip Flexion: 5/5 (5-/5)  L Hip ABduction: 5/5  L Hip ADduction: 5/5  L Knee Flexion: 4+/5  L Knee Extension: 4+/5  L Ankle Dorsiflexion: 5/5  L Ankle Plantar Flexion: 5/5  Strength Other  Other: Fair pelvic tilt, poor TA contraction, with poor breath control.      Additional Measures  Flexibility: L HS flexibility to 60 degrees, R HS flexibility to 75 degrees  Special Tests: (-)SLR and MIHIR bilaterally  Other: Oswestry Disability Index Questionnaire: 44% impairment  Sensation  Overall Sensation Status: WFL (WFL to light touch, but radicular N/T down posterior LLE)             Ambulation  Ambulation?: Yes  Ambulation 1  Surface: level tile  Device: No Device  Assistance: Independent  Quality of Gait: Antalgic, with limited stance phase and swing through of LLE     Exercises  Exercise 1: Assess leg length  Exercise 2: Isometric L hip extension from [address] 5 x 5\" (manually x 3)  Exercise 3: Isometric R hip flexion from 90/90 5 x 5\" (manually x 3)  Exercise 4: TA contraction (alone, UE, LE, UE/LE)  Exercise 5: Pelvic tilt  Exercise 6: Hooklying lumbar rotation  Exercise 7: Supine quadratus stretch  Exercise 8: Supine piriformis stretch  Exercise 9: SKTC/DKTC  Exercise 10: Bridging/Single leg bridging L  Exercise 11: Axial traction bilat LE  Exercise 12: Contract/relax bilat HS and hip ER  Exercise 13: Sitting L hip retraction/R hip protraction  Exercise 14: Repeated sit to stand with TA contraction  Exercise 15: Sitting fwd reach (to R, to L)  Exercise 16: Multifidus row/Paloff press  Exercise 17: Standing hip abd/ext  Exercise 18: Standing march  Exercise 19: Standing glute squeeze  Exercise 20: IFC + MH PRN for elevated pain                      Assessment   Conditions Requiring Skilled Therapeutic Intervention  Body structures, Functions, Activity limitations: Decreased functional mobility ; Decreased sensation; Increased pain; Decreased posture; Decreased ROM; Decreased ADL status; Decreased strength; Decreased high-level IADLs  Assessment: Pt presents with complaint of low back and LLE pain. Demonstrates decreased core strength and ROM as well as leg length discrepancy and pelvic obliquity consistent with SI joint dysfunction. Will benefit from skilled PT intervention to address listed impairments.   Treatment Diagnosis: Low back pain  Prognosis: Good  Decision Making: Medium Complexity  History: See above  Exam: See above  Clinical Presentation: Evolving  REQUIRES PT FOLLOW UP: Yes  Treatment Initiated : ther ex  Discharge Recommendations: Continue to assess pending progress  Activity Tolerance  Activity Tolerance: Patient Tolerated treatment well; Patient limited by pain         Plan   Plan  Times per week: 2x  Plan weeks: 4-6 weeks  Current Treatment Recommendations: Strengthening, ROM, Home Exercise Program, Manual Therapy - Soft Tissue Mobilization, Manual Therapy - Joint Manipulation, Modalities, Patient/Caregiver Education & Training    Goals  Short term goals  Time Frame for Short term goals: 3-4 weeks  Short term goal 1: Independent with HEP  Short term goal 2: Improve lumbar ROM to at least 50 degrees flexion, 25 degrees SB bilaterally  Short term goal 3: Perform 10 good TA contractions with good breath control  Short term goal 4: Improve bilat HS flexibility to at least 80 degrees  Long term goals  Time Frame for Long term goals : 4-6 weeks  Long term goal 1: Centralize LLE radicular symptoms to L buttocks and above  Long term goal 2: Report ability to tolerate at least 30 min of sitting w/o increased pain  Long term goal 3: Improve Oswestry score to 25% impairment or better  Patient Goals   Patient goals : reduce back and LLE pain       Therapy Time   Individual Concurrent Group Co-treatment   Time In 1508         Time Out 1600         Minutes 52         Timed Code Treatment Minutes: 15 Minutes  Electronically signed by Luis Miguel Navarro PT on 11/23/2021 at 4:50 PM

## 2021-11-29 ENCOUNTER — APPOINTMENT (OUTPATIENT)
Dept: PHYSICAL THERAPY | Age: 36
End: 2021-11-29
Payer: COMMERCIAL

## 2021-11-30 ENCOUNTER — HOSPITAL ENCOUNTER (OUTPATIENT)
Dept: PHYSICAL THERAPY | Age: 36
Setting detail: THERAPIES SERIES
End: 2021-11-30
Payer: COMMERCIAL

## 2021-12-01 ENCOUNTER — APPOINTMENT (OUTPATIENT)
Dept: PHYSICAL THERAPY | Age: 36
End: 2021-12-01
Payer: COMMERCIAL

## 2021-12-02 ENCOUNTER — HOSPITAL ENCOUNTER (OUTPATIENT)
Dept: PHYSICAL THERAPY | Age: 36
Setting detail: THERAPIES SERIES
Discharge: HOME | End: 2021-12-02
Payer: COMMERCIAL

## 2021-12-02 PROCEDURE — G0283 ELEC STIM OTHER THAN WOUND: HCPCS

## 2021-12-02 PROCEDURE — 97110 THERAPEUTIC EXERCISES: CPT

## 2021-12-02 ASSESSMENT — PAIN DESCRIPTION - ORIENTATION: ORIENTATION: LEFT

## 2021-12-02 ASSESSMENT — PAIN DESCRIPTION - DESCRIPTORS: DESCRIPTORS: SHOOTING;RADIATING

## 2021-12-02 ASSESSMENT — PAIN DESCRIPTION - PAIN TYPE: TYPE: ACUTE PAIN

## 2021-12-02 ASSESSMENT — PAIN SCALES - GENERAL: PAINLEVEL_OUTOF10: 7

## 2021-12-02 ASSESSMENT — PAIN DESCRIPTION - LOCATION: LOCATION: BACK;LEG

## 2021-12-02 NOTE — PROGRESS NOTES
Physical Therapy  Daily Treatment Note  Date: 2021  Patient Name: Elena Doshi  MRN: [id number]     :   1985    Subjective:   General  Chart Reviewed: Yes  Additional Pertinent Hx: 28 y/o F presents with complaint of low back and LLE pain. PMH includes migraines, HTN, L knee scope  Response To Previous Treatment: Not applicable  Family / Caregiver Present: No  Referring Practitioner: ZEESHAN Suarez  PT Visit Information  PT Insurance Information: BCBS (No precert req'd)  Total # of Visits Approved: 12  Total # of Visits to Date: 2  Plan of Care/Certification Expiration Date: 22  Progress Note Due Date: 21  Subjective  Subjective: it's just always a 6-7 and shoots down my leg when I stand up.   Pain Screening  Patient Currently in Pain: Yes  Pain Assessment  Pain Assessment: 0-10  Pain Level: 7  Pain Type: Acute pain  Pain Location: Back; Leg  Pain Orientation: Left  Pain Descriptors: Shooting; Radiating  Vital Signs  Patient Currently in Pain: Yes       Treatment Activities:       Exercises  Exercise 1: Assess leg length  Exercise 2: Isometric L hip extension from SKTC 5 x 5 second hold (manually x 3)  Exercise 3: Isometric R hip flexion from 90/90 5 x 5 seconds (manually x 3)  Exercise 4: TA contraction (alone  with 5 second hold, UE, LE, UE/LE)  x 5 each  Exercise 5: Pelvic tilt  x 10  Exercise 6: Hooklying lumbar rotation  x 10  Exercise 7: Supine quadratus stretch  x 5 with 5 second hold  Exercise 8: Supine piriformis stretch  x 5 with 5 second hold  Exercise 9: SKTC/DKTC  x 5 with 5 second hold  Exercise 10: Bridging/Single leg bridging L  x 10 each  Exercise 11: Axial traction bilat LE  x 4  10 second hold  Exercise 12: Contract/relax bilat HS and hip ER  x 4  Exercise 13: Sitting L hip retraction/R hip protraction  x 5 with 5 second hold  Exercise 14: Repeated sit to stand with TA contraction  x 5  Exercise 15: Sitting fwd reach (to R, to L)  Exercise 16: Multifidus row/Paloff press  Exercise 17: Standing hip abd/ext  Exercise 18: Standing march  Exercise 19: Standing glute squeeze  Exercise 20: IFC + MH PRN for elevated pain  seated x 20 mins        Assessment:   Conditions Requiring Skilled Therapeutic Intervention  Body structures, Functions, Activity limitations: Decreased functional mobility ; Decreased sensation; Increased pain; Decreased posture; Decreased ROM; Decreased ADL status; Decreased strength; Decreased high-level IADLs  Assessment: Initiated exericses and modalities per evaluating therapist to decrease pain and increase core strength. Pateint tolerated low reps and few exercises today. will continue to add exericses and increase reps as patient can tolerate. She reports some decrease of discomfort post session.   Treatment Diagnosis: Low back pain  Prognosis: Good  REQUIRES PT FOLLOW UP: Yes  Treatment Initiated : ther ex  Discharge Recommendations: Continue to assess pending progress    Goals:  Short term goals  Time Frame for Short term goals: 3-4 weeks  Short term goal 1: Independent with HEP  Short term goal 2: Improve lumbar ROM to at least 50 degrees flexion, 25 degrees SB bilaterally  Short term goal 3: Perform 10 good TA contractions with good breath control  Short term goal 4: Improve bilat HS flexibility to at least 80 degrees  Long term goals  Time Frame for Long term goals : 4-6 weeks  Long term goal 1: Centralize LLE radicular symptoms to L buttocks and above  Long term goal 2: Report ability to tolerate at least 30 min of sitting w/o increased pain  Long term goal 3: Improve Oswestry score to 25% impairment or better  Patient Goals   Patient goals : reduce back and LLE pain  Plan:    Plan  Times per week: 2x  Plan weeks: 4-6 weeks  Current Treatment Recommendations: Strengthening, ROM, Home Exercise Program, Manual Therapy - Soft Tissue Mobilization, Manual Therapy - Joint Manipulation, Modalities, Patient/Caregiver Education & Training  Timed Code Treatment Minutes: 52 Minutes     Therapy Time   Individual Concurrent Group Co-treatment   Time In 8502         Time Out 1606         Minutes 69         Timed Code Treatment Minutes: 52 Minutes  Electronically signed by Mayra Soriano PTA on 12/2/2021 at 4:07 PM

## 2021-12-03 RX ORDER — DICYCLOMINE HYDROCHLORIDE 10 MG/1
10 CAPSULE ORAL 3 TIMES DAILY PRN
Qty: 270 CAPSULE | Refills: 1 | Status: SHIPPED | OUTPATIENT
Start: 2021-12-03

## 2021-12-03 NOTE — TELEPHONE ENCOUNTER
Mike Marco A called to request a refill on her medication.       Last office visit : 10/5/2021   Next office visit : 12/14/2021     Requested Prescriptions     Pending Prescriptions Disp Refills    dicyclomine (BENTYL) 10 MG capsule [Pharmacy Med Name: DICYCLOMINE 10 MG CAPSULE] 270 capsule 1     Sig: TAKE 1 CAPSULE BY MOUTH 3 TIMES DAILY AS NEEDED (ABDOMINAL CRAMPING)            Gilles Presley MA

## 2021-12-07 ENCOUNTER — HOSPITAL ENCOUNTER (OUTPATIENT)
Dept: PHYSICAL THERAPY | Age: 36
Setting detail: THERAPIES SERIES
Discharge: HOME | End: 2021-12-07
Payer: COMMERCIAL

## 2021-12-07 PROCEDURE — 97140 MANUAL THERAPY 1/> REGIONS: CPT

## 2021-12-07 PROCEDURE — 97110 THERAPEUTIC EXERCISES: CPT

## 2021-12-07 PROCEDURE — G0283 ELEC STIM OTHER THAN WOUND: HCPCS

## 2021-12-07 ASSESSMENT — PAIN DESCRIPTION - FREQUENCY: FREQUENCY: CONTINUOUS

## 2021-12-07 ASSESSMENT — PAIN SCALES - GENERAL: PAINLEVEL_OUTOF10: 8

## 2021-12-07 ASSESSMENT — PAIN DESCRIPTION - ORIENTATION: ORIENTATION: LEFT

## 2021-12-07 ASSESSMENT — PAIN DESCRIPTION - DESCRIPTORS: DESCRIPTORS: ACHING;SHOOTING;RADIATING

## 2021-12-07 ASSESSMENT — PAIN DESCRIPTION - PAIN TYPE: TYPE: ACUTE PAIN

## 2021-12-07 ASSESSMENT — PAIN DESCRIPTION - LOCATION: LOCATION: BACK;LEG

## 2021-12-07 NOTE — PROGRESS NOTES
Daily Treatment Note  Date: 2021  Patient Name: Manpreet Miller  MRN: [id number]     :   1985    Subjective:   General  Chart Reviewed: Yes  Additional Pertinent Hx: 30 y/o F presents with complaint of low back and LLE pain. PMH includes migraines, HTN, L knee scope  Response To Previous Treatment: Not applicable  Family / Caregiver Present: No  Referring Practitioner: ZEESHAN Fairbanks  PT Visit Information  PT Insurance Information: BCBS (No precert req'd)  Total # of Visits Approved: 12  Total # of Visits to Date: 3  Plan of Care/Certification Expiration Date: 22  Progress Note Due Date: 21  Subjective  Subjective: Patient reports that she is having increased pain since starting therapy with it being up to a 8-9/10 with it increasing at night. Pain Screening  Patient Currently in Pain: Yes  Pain Assessment  Pain Assessment: 0-10  Pain Level: 8  Pain Type: Acute pain  Pain Location: Back; Leg  Pain Orientation: Left  Pain Descriptors: Aching;  Shooting; Radiating  Pain Frequency: Continuous  Vital Signs  Patient Currently in Pain: Yes       Treatment Activities:     Exercises  Exercise 1: Assess leg length  Exercise 2: Isometric L hip extension from [facility] 5 x 5 second hold (manually x 3)  Exercise 3: Isometric R hip flexion from 90/90 5 x 5 seconds (manually x 3)  Exercise 4: TA contraction (alone  with 5 second hold, UE, LE, UE/LE)  x 5 each  Exercise 5: Pelvic tilt  x 10  Exercise 6: Hooklying lumbar rotation  x 10  Exercise 7: Supine quadratus stretch  x 5 with 5 second hold  Exercise 8: Supine piriformis stretch  x 5 with 5 second hold  Exercise 9: SKTC/DKTC  x 5 with 5 second hold  Exercise 10: Bridging/Single leg bridging L  x 10 each NOT TODAY  Exercise 11: Axial traction bilat LE  x 4  10 second hold/ L LE rolling and IT band stretch x 10 min  Exercise 12: Contract/relax bilat HS and hip ER  x 4  Exercise 13: Sitting L hip retraction/R hip protraction  x 5 with 5 second hold NOT TODAY  Exercise 14: Repeated sit to stand with TA contraction  x 5 NOT TODAY  Exercise 15: Sitting fwd reach (to R, to L) NOT TODAY  Exercise 16: Multifidus row/Paloff press NOT TODAY  Exercise 17: Standing hip abd/ext NOT TODAY  Exercise 18: Standing march NOT TODAY  Exercise 19: Standing glute squeeze NOT TODAY     Assessment:   Conditions Requiring Skilled Therapeutic Intervention  Body structures, Functions, Activity limitations: Decreased functional mobility ; Decreased sensation; Increased pain; Decreased posture; Decreased ROM; Decreased ADL status; Decreased strength; Decreased high-level IADLs  Assessment: Holding on some exercises this date from previous treatment due to patient having continued increased pain since starting tx. She shows some increased pain with exercises this date but is able to complete exercises listed. Patient showing noted increased L IT band tightness with report of improvement of symptoms post manual techniques. Continue with current POC.   Treatment Diagnosis: Low back pain  Prognosis: Good  REQUIRES PT FOLLOW UP: Yes  Treatment Initiated : ther ex  Discharge Recommendations: Continue to assess pending progress      Goals:  Short term goals  Time Frame for Short term goals: 3-4 weeks  Short term goal 1: Independent with HEP  Short term goal 2: Improve lumbar ROM to at least 50 degrees flexion, 25 degrees SB bilaterally  Short term goal 3: Perform 10 good TA contractions with good breath control  Short term goal 4: Improve bilat HS flexibility to at least 80 degrees  Long term goals  Time Frame for Long term goals : 4-6 weeks  Long term goal 1: Centralize LLE radicular symptoms to L buttocks and above  Long term goal 2: Report ability to tolerate at least 30 min of sitting w/o increased pain  Long term goal 3: Improve Oswestry score to 25% impairment or better  Patient Goals   Patient goals : reduce back and LLE pain    Plan:    Plan  Times per week: 2x  Plan weeks: 4-6 weeks  Current Treatment Recommendations: Strengthening, ROM, Home Exercise Program, Manual Therapy - Soft Tissue Mobilization, Manual Therapy - Joint Manipulation, Modalities, Patient/Caregiver Education & Training  Timed Code Treatment Minutes: 54 Minutes     Therapy Time   Individual Concurrent Group Co-treatment   Time In 1503         Time Out 6727         Minutes 64         Timed Code Treatment Minutes: 54 Minutes       Electronically signed by Rebeca Mc PTA on 12/7/2021 at 4:34 PM

## 2021-12-09 ENCOUNTER — HOSPITAL ENCOUNTER (OUTPATIENT)
Dept: PHYSICAL THERAPY | Age: 36
Setting detail: THERAPIES SERIES
Discharge: HOME | End: 2021-12-09
Payer: COMMERCIAL

## 2021-12-09 PROCEDURE — 97110 THERAPEUTIC EXERCISES: CPT

## 2021-12-09 PROCEDURE — 97140 MANUAL THERAPY 1/> REGIONS: CPT

## 2021-12-09 RX ORDER — TIZANIDINE 4 MG/1
4 TABLET ORAL 2 TIMES DAILY PRN
Qty: 30 TABLET | Refills: 1 | Status: SHIPPED | OUTPATIENT
Start: 2021-12-09 | End: 2021-12-17

## 2021-12-09 ASSESSMENT — PAIN DESCRIPTION - LOCATION: LOCATION: BACK;LEG

## 2021-12-09 ASSESSMENT — PAIN DESCRIPTION - PAIN TYPE: TYPE: ACUTE PAIN

## 2021-12-09 ASSESSMENT — PAIN SCALES - GENERAL: PAINLEVEL_OUTOF10: 8

## 2021-12-09 ASSESSMENT — PAIN DESCRIPTION - FREQUENCY: FREQUENCY: INTERMITTENT

## 2021-12-09 ASSESSMENT — PAIN DESCRIPTION - ORIENTATION: ORIENTATION: LEFT

## 2021-12-09 NOTE — PROGRESS NOTES
Physical Therapy  Daily Treatment Note  Date: 2021  Patient Name: Melany Royal  MRN: [id number]     :   1985    Subjective:   General  Chart Reviewed: Yes  Additional Pertinent Hx: 28 y/o F presents with complaint of low back and LLE pain. PMH includes migraines, HTN, L knee scope  Response To Previous Treatment: Not applicable  Family / Caregiver Present: No  Referring Practitioner: ZEESHAN Thornton  PT Visit Information  PT Insurance Information: BCBS (No precert req'd)  Total # of Visits Approved: 12  Total # of Visits to Date: 4  Plan of Care/Certification Expiration Date: 22  Progress Note Due Date: 21  Subjective  Subjective: Still having pain today. Just getting temporary relief after sessions.   Pain Screening  Patient Currently in Pain: Yes  Pain Assessment  Pain Assessment: 0-10  Pain Level: 8  Pain Type: Acute pain  Pain Location: Back; Leg  Pain Orientation: Left  Pain Frequency: Intermittent       Treatment Activities:          Exercises  Exercise 2: Isometric L hip extension from SKTC 5 x 5 second hold (manually x 3)  Exercise 3: Isometric R hip flexion from 90/90 5 x 5 seconds (manually x 3)  Exercise 4: TA contraction (alone  with 5 second hold  x 10, UE x 10, LE x 10, UE/LE x 5)  Exercise 5: Pelvic tilt  x 10  Exercise 6: Hooklying lumbar rotation  5 x 5 sec  Exercise 7: Supine quadratus stretch  x 3 with 10 second hold  Exercise 8: Supine piriformis stretch  x 5 with 5 second hold  Exercise 9: SKTC/DKTC  x 5 with 5 second hold  Exercise 10: Bridging/Single leg bridging L  x 10 each  Exercise 11: Axial traction bilat LE  x 4  10 second hold/ L LE rolling and IT band stretch x 10 min  Exercise 12: Contract/relax bilat HS and hip ER  x 4  Exercise 13: Sitting L hip retraction/R hip protraction  x 5 with 5 second hold NOT TODAY  Exercise 14: Repeated sit to stand with TA contraction  x 5 NOT TODAY  Exercise 15: Sitting fwd reach (to R, to L) NOT TODAY  Exercise 16: Multifidus row/Paloff press NOT TODAY  Exercise 17: Standing hip abd/ext NOT TODAY  Exercise 18: Standing march NOT TODAY  Exercise 19: Standing glute squeeze NOT TODAY           Assessment:   Conditions Requiring Skilled Therapeutic Intervention  Body structures, Functions, Activity limitations: Decreased functional mobility ; Decreased sensation; Increased pain; Decreased posture; Decreased ROM; Decreased ADL status; Decreased strength; Decreased high-level IADLs  Assessment: Patient appeared to respond well to todays session. HEP issued. Patient demonstrated ones reviewed and verbalized understanding. She relates morning is the worst and that is when she feels the tightest.  Patient reported pain at 8/10 pre session and \"feels better \" post session.   Treatment Diagnosis: Low back pain  Prognosis: Good  REQUIRES PT FOLLOW UP: Yes  Treatment Initiated : ther ex  Discharge Recommendations: Continue to assess pending progress    Goals:  Short term goals  Time Frame for Short term goals: 3-4 weeks  Short term goal 1: Independent with HEP  Short term goal 2: Improve lumbar ROM to at least 50 degrees flexion, 25 degrees SB bilaterally  Short term goal 3: Perform 10 good TA contractions with good breath control  Short term goal 4: Improve bilat HS flexibility to at least 80 degrees  Long term goals  Time Frame for Long term goals : 4-6 weeks  Long term goal 1: Centralize LLE radicular symptoms to L buttocks and above  Long term goal 2: Report ability to tolerate at least 30 min of sitting w/o increased pain  Long term goal 3: Improve Oswestry score to 25% impairment or better  Patient Goals   Patient goals : reduce back and LLE pain  Plan:    Plan  Times per week: 2x  Plan weeks: 4-6 weeks  Current Treatment Recommendations: Strengthening, ROM, Home Exercise Program, Manual Therapy - Soft Tissue Mobilization, Manual Therapy - Joint Manipulation, Modalities, Patient/Caregiver Education & Training  Timed Code Treatment Minutes: 60 Minutes     Therapy Time   Individual Concurrent Group Co-treatment   Time In 1500         Time Out 1600         Minutes 60         Timed Code Treatment Minutes: 60 Minutes  Electronically signed by Manolo Carrillo PTA on 12/9/2021 at 4:29 PM

## 2021-12-11 ENCOUNTER — PATIENT MESSAGE (OUTPATIENT)
Dept: FAMILY MEDICINE CLINIC | Age: 36
End: 2021-12-11

## 2021-12-12 RX ORDER — LOSARTAN POTASSIUM 25 MG/1
25 TABLET ORAL DAILY
Qty: 30 TABLET | Refills: 0 | Status: SHIPPED | OUTPATIENT
Start: 2021-12-12 | End: 2022-01-12

## 2021-12-13 ENCOUNTER — HOSPITAL ENCOUNTER (OUTPATIENT)
Dept: PHYSICAL THERAPY | Age: 36
Setting detail: THERAPIES SERIES
Discharge: HOME | End: 2021-12-13
Payer: COMMERCIAL

## 2021-12-13 PROCEDURE — G0283 ELEC STIM OTHER THAN WOUND: HCPCS

## 2021-12-13 PROCEDURE — 97140 MANUAL THERAPY 1/> REGIONS: CPT

## 2021-12-13 PROCEDURE — 97110 THERAPEUTIC EXERCISES: CPT

## 2021-12-13 ASSESSMENT — PAIN DESCRIPTION - LOCATION: LOCATION: BACK;LEG;KNEE;ANKLE

## 2021-12-13 ASSESSMENT — PAIN DESCRIPTION - ORIENTATION: ORIENTATION: LOWER;LEFT

## 2021-12-13 ASSESSMENT — PAIN DESCRIPTION - PAIN TYPE: TYPE: ACUTE PAIN

## 2021-12-13 NOTE — TELEPHONE ENCOUNTER
From: Marc Mckeon  To: Lydia White  Sent: 12/11/2021 5:13 PM CST  Subject: Losartan     Can I get my blood pressure medicine refilled? It says its not eligible for refills on my chart, and I have already been out two days.

## 2021-12-13 NOTE — PROGRESS NOTES
Physical Therapy  Daily Treatment Note  Date: 2021  Patient Name: Melany Royal  MRN: [id number]     :   1985    Subjective:   General  Referring Practitioner: ZEESHAN Thornton  PT Insurance Information: BCBS (No precert req'd)  Total # of Visits Approved: 12  Total # of Visits to Date: 5  Plan of Care/Certification Expiration Date: 22  Progress Note Due Date: 21  Subjective: my back is sore but it's hurting down my L leg to my knee and ankle  Patient Currently in Pain: Yes  Pain Level:  (5.5)  Pain Type: Acute pain  Pain Location: Back; Leg; Knee; Ankle  Pain Orientation: Lower;  Left       Treatment Activities:       Exercises  Exercise 1: Assess leg length--: even          : HEP ISSUED  Exercise 2: Isometric L hip extension from [facility] 5 x 5 second hold (manually x 3)  : even  not today  Exercise 3: Isometric R hip flexion from 90/90 5 x 5 seconds (manually x 3)  : even  not today  Exercise 4: TA contraction (alone  with 5 second hold  x 10, UE x 10, LE x 10, UE/LE x 5)  Exercise 5: Pelvic tilt  x 10  Exercise 6: Hooklying lumbar rotation  5 x 5 sec  Exercise 7: Supine quadratus stretch  x 3 with 10 second hold  Exercise 8: Supine piriformis stretch  x 5 with 5 second hold  Exercise 9: SKTC/DKTC  x 5 with 5 second hold  Exercise 10: Bridging/Single leg bridging L  x 10 each  Exercise 11: Axial traction bilat LE  x 4  10 second hold/ L LE rolling and IT band stretch x 10 min  13: trigger point in L gluteus min  Exercise 12: Contract/relax bilat HS and hip ER  x 4  Exercise 13: Sitting L hip retraction/R hip protraction  x 5 with 5 second  Exercise 14: Repeated sit to stand with TA contraction  x 5  Exercise 15: Sitting fwd reach (to R, to L) NOT TODAY  Exercise 16: Multifidus row/Paloff press NOT TODAY  Exercise 17: Standing hip abd/ext NOT TODAY  Exercise 18: Standing march NOT TODAY  Exercise 19: Standing glute squeeze NOT TODAY  Exercise 20: IFC +  PRN for elevated pain  seated x 20 mins     Assessment:   Conditions Requiring Skilled Therapeutic Intervention  Body structures, Functions, Activity limitations: Decreased functional mobility ; Decreased sensation; Increased pain; Decreased posture; Decreased ROM; Decreased ADL status; Decreased strength; Decreased high-level IADLs  Assessment: Per patients subjective statement when using roller on L outer hip and IT band region found a trigger in her L gluteus minimus which reproduced the LLE pain which she c/o. Today her leg length was even so synergistic techniques were not performed.  Still has exercises which haven't been attempted and will add them as time permits, she was 11 min late for her appointment today  Treatment Diagnosis: Low back pain  REQUIRES PT FOLLOW UP: Yes        Goals:  Short term goals  Time Frame for Short term goals: 3-4 weeks  Short term goal 1: Independent with HEP  Short term goal 2: Improve lumbar ROM to at least 50 degrees flexion, 25 degrees SB bilaterally  Short term goal 3: Perform 10 good TA contractions with good breath control  Short term goal 4: Improve bilat HS flexibility to at least 80 degrees  Long term goals  Time Frame for Long term goals : 4-6 weeks  Long term goal 1: Centralize LLE radicular symptoms to L buttocks and above  Long term goal 2: Report ability to tolerate at least 30 min of sitting w/o increased pain  Long term goal 3: Improve Oswestry score to 25% impairment or better  Patient Goals   Patient goals : reduce back and LLE pain    Plan:    Times per week: 2x  Plan weeks: 4-6 weeks  Current Treatment Recommendations: Strengthening, ROM, Home Exercise Program, Manual Therapy - Soft Tissue Mobilization, Manual Therapy - Joint Manipulation, Modalities, Patient/Caregiver Education & Training       Therapy Time   Individual Concurrent Group Co-treatment   Time In 1512         Time Out 1613         Minutes 61         Timed Code Treatment Minutes: 06[address] [facility] A Gary Conley, PTA    Electronically signed by Lauren Anaya PTA on 12/13/2021 at 4:57 PM

## 2021-12-15 ENCOUNTER — HOSPITAL ENCOUNTER (OUTPATIENT)
Dept: PHYSICAL THERAPY | Age: 36
Setting detail: THERAPIES SERIES
Discharge: HOME | End: 2021-12-15
Payer: COMMERCIAL

## 2021-12-15 PROCEDURE — 97140 MANUAL THERAPY 1/> REGIONS: CPT

## 2021-12-15 PROCEDURE — G0283 ELEC STIM OTHER THAN WOUND: HCPCS

## 2021-12-15 PROCEDURE — 97110 THERAPEUTIC EXERCISES: CPT

## 2021-12-15 ASSESSMENT — PAIN DESCRIPTION - LOCATION: LOCATION: LEG;HIP;BACK

## 2021-12-15 ASSESSMENT — PAIN SCALES - GENERAL: PAINLEVEL_OUTOF10: 8

## 2021-12-15 ASSESSMENT — PAIN DESCRIPTION - ORIENTATION: ORIENTATION: LEFT;RIGHT;LOWER

## 2021-12-15 ASSESSMENT — PAIN DESCRIPTION - PAIN TYPE: TYPE: CHRONIC PAIN

## 2021-12-15 NOTE — PROGRESS NOTES
Physical Therapy  Daily Treatment Note  Date: 12/15/2021  Patient Name: Delores Alexander  MRN: [id number]     :   1985    Subjective:   General  Referring Practitioner: ZEESHAN Yu  PT Insurance Information: BCBS (No precert req'd)  Total # of Visits Approved: 12  Total # of Visits to Date: 6  Plan of Care/Certification Expiration Date: 22  Progress Note Due Date: 21  Subjective: it's hurting more today but i've been up and down more at work and don't know if that has anything to do with it or not. For the past couple of days my lower back has been hurting more also even on the R side  Patient Currently in Pain: Yes  Pain Level: 8  Pain Type: Chronic pain  Pain Location: Leg; Hip; Back  Pain Orientation: Left; Right;  Lower       Treatment Activities:   Exercises  Exercise 1: Assess leg length--12/15: even          : HEP ISSUED  Exercise 2: Isometric L hip extension from [facility] 5 x 5 second hold (manually x 3)  12/15: even  not today  Exercise 3: Isometric R hip flexion from 90/90 5 x 5 seconds (manually x 3)  12/15: even  not today  Exercise 4: TA contraction (alone  with 5 second hold  x 10, UE x 10, LE x 10, UE/LE x 5)  Exercise 5: Pelvic tilt  x 10  Exercise 6: Hooklying lumbar rotation  5 x 5 sec  Exercise 7: Supine quadratus stretch  x 3 with 10 second hold  Exercise 8: Supine piriformis stretch  x 3 with 10 second hold  Exercise 9: SKTC/DKTC  x 5 with 5 second hold  Exercise 10: Bridging/Single leg bridging L  x 10 each  Exercise 11: Axial traction bilat LE  x 4  10 second hold/ L LE rolling and IT band stretch x 10 min  13: trigger point in L gluteus min  Exercise 12: Contract/relax bilat HS and hip ER  x 4  Exercise 13: Sitting L hip retraction/R hip protraction  x 5 with 5 second  Exercise 14: Repeated sit to stand with TA contraction  x 5  Exercise 15: Sitting fwd reach (to R, to L) NOT TODAY  Exercise 16: Multifidus row/Paloff press NOT TODAY  Exercise 17: Standing hip abd/ext NOT TODAY  Exercise 18: Standing march NOT TODAY  Exercise 19: Standing glute squeeze NOT TODAY  Exercise 20: IFC + MH PRN for elevated pain  seated x 20 mins     Assessment:   Conditions Requiring Skilled Therapeutic Intervention  Body structures, Functions, Activity limitations: Decreased functional mobility ; Decreased sensation; Increased pain; Decreased posture; Decreased ROM; Decreased ADL status; Decreased strength; Decreased high-level IADLs  Assessment: Leg length was even again and intensity of referred pain from L gluteus minimus trigger point was less and it released sooner than on previous visit. Tactile cues again for correct motion with pelvic tilts and R h/s tighter than L today with manual stretching.   Treatment Diagnosis: Low back pain  REQUIRES PT FOLLOW UP: Yes      Goals:  Short term goals  Time Frame for Short term goals: 3-4 weeks  Short term goal 1: Independent with HEP  Short term goal 2: Improve lumbar ROM to at least 50 degrees flexion, 25 degrees SB bilaterally  Short term goal 3: Perform 10 good TA contractions with good breath control  Short term goal 4: Improve bilat HS flexibility to at least 80 degrees  Long term goals  Time Frame for Long term goals : 4-6 weeks  Long term goal 1: Centralize LLE radicular symptoms to L buttocks and above  Long term goal 2: Report ability to tolerate at least 30 min of sitting w/o increased pain  Long term goal 3: Improve Oswestry score to 25% impairment or better  Patient Goals   Patient goals : reduce back and LLE pain    Plan:    Times per week: 2x  Plan weeks: 4-6 weeks  Current Treatment Recommendations: Strengthening, ROM, Home Exercise Program, Manual Therapy - Soft Tissue Mobilization, Manual Therapy - Joint Manipulation, Modalities, Patient/Caregiver Education & Training       Therapy Time   Individual Concurrent Group Co-treatment   Time In 1503         Time Out 1608         Minutes 65         Timed Code Treatment Minutes: 45 Minutes       Odalis Gilliam PTA    Electronically signed by Odalis Gilliam PTA on 12/15/2021 at 4:45 PM

## 2021-12-17 RX ORDER — TIZANIDINE 4 MG/1
4 TABLET ORAL 2 TIMES DAILY PRN
Qty: 30 TABLET | Refills: 1 | Status: SHIPPED | OUTPATIENT
Start: 2021-12-17 | End: 2021-12-28

## 2021-12-20 ENCOUNTER — HOSPITAL ENCOUNTER (OUTPATIENT)
Dept: PHYSICAL THERAPY | Age: 36
Setting detail: THERAPIES SERIES
Discharge: HOME | End: 2021-12-20
Payer: COMMERCIAL

## 2021-12-20 PROCEDURE — G0283 ELEC STIM OTHER THAN WOUND: HCPCS

## 2021-12-20 PROCEDURE — 97140 MANUAL THERAPY 1/> REGIONS: CPT

## 2021-12-20 PROCEDURE — 97110 THERAPEUTIC EXERCISES: CPT

## 2021-12-20 ASSESSMENT — PAIN DESCRIPTION - ORIENTATION: ORIENTATION: LEFT;RIGHT;LOWER

## 2021-12-20 ASSESSMENT — PAIN DESCRIPTION - PAIN TYPE: TYPE: CHRONIC PAIN

## 2021-12-20 ASSESSMENT — PAIN SCALES - GENERAL: PAINLEVEL_OUTOF10: 8

## 2021-12-20 ASSESSMENT — PAIN DESCRIPTION - LOCATION: LOCATION: BACK;LEG;HIP

## 2021-12-20 NOTE — PROGRESS NOTES
Physical Therapy  Daily Treatment Note  Date: 2021  Patient Name: Shama Hernandez  MRN: [id number]     :   1985    Subjective:   General  Referring Practitioner: ZEESHAN Mcghee  PT Insurance Information: BCBS (No precert req'd)  Total # of Visits Approved: 12  Total # of Visits to Date: 7  Plan of Care/Certification Expiration Date: 22  Progress Note Due Date: 21  Subjective: i feel like it is getting worse and now it hurts on both sides of my lower back, i had a good cry about it yesterday  Patient Currently in Pain: Yes  Pain Level: 8  Pain Type: Chronic pain  Pain Location: Back;Leg;Hip  Pain Orientation: Left;Right; Lower       Treatment Activities:     Exercises  Exercise 1: Assess leg length--: LLE longer          : HEP ISSUED  Exercise 2: Isometric L hip extension from [facility] 5 x 5 second hold (manually x 1)  Exercise 3: Isometric R hip flexion from 90/90 5 x 5 seconds (manually x 1)  Exercise 4: TA contraction (alone  with 5 second hold  x 10, UE x 10, LE x 10, UE/LE x 5)  Exercise 5: Pelvic tilt  x 10  Exercise 6: Hooklying lumbar rotation  5 x 5 sec  Exercise 7: Supine quadratus stretch  x 3 with 10 second hold  Exercise 8: Supine piriformis stretch  x 3 with 10 second hold  Exercise 9: SKTC/DKTC  x 5 with 5 second hold  Exercise 10: Bridging/Single leg bridging L  x 10 each  Exercise 11: Contract/relax bilat HS and hip ER  x 4  Exercise 12: Axial traction bilat LE  x 4  10 second hold/ L LE rolling and IT band stretch x 10 min  12/13: trigger point in L gluteus min  Exercise 13: Sitting L hip retraction/R hip protraction  x 5 with 5 second  Exercise 14: Repeated sit to stand with TA contraction  x 5  Exercise 15: Sitting fwd reach (to R, to L) NOT TODAY  Exercise 16: Multifidus row/Paloff press NOT TODAY  Exercise 17: Standing hip abd/ext NOT TODAY  Exercise 18: Standing march NOT TODAY  Exercise 19: Standing glute squeeze NOT TODAY  Exercise 20: IFC + MH PRN for Maye, PTA on 12/20/2021 at 5:09 PM

## 2021-12-22 ENCOUNTER — HOSPITAL ENCOUNTER (OUTPATIENT)
Dept: PHYSICAL THERAPY | Age: 36
Setting detail: THERAPIES SERIES
Discharge: HOME | End: 2021-12-22
Payer: COMMERCIAL

## 2021-12-22 PROCEDURE — G0283 ELEC STIM OTHER THAN WOUND: HCPCS

## 2021-12-22 PROCEDURE — 97110 THERAPEUTIC EXERCISES: CPT

## 2021-12-22 PROCEDURE — 97140 MANUAL THERAPY 1/> REGIONS: CPT

## 2021-12-22 ASSESSMENT — PAIN DESCRIPTION - LOCATION: LOCATION: BACK;LEG;HIP

## 2021-12-22 ASSESSMENT — PAIN DESCRIPTION - ORIENTATION: ORIENTATION: RIGHT;LEFT;LOWER

## 2021-12-22 ASSESSMENT — PAIN DESCRIPTION - DESCRIPTORS: DESCRIPTORS: ACHING;SHOOTING;RADIATING

## 2021-12-22 ASSESSMENT — PAIN SCALES - GENERAL: PAINLEVEL_OUTOF10: 9

## 2021-12-22 ASSESSMENT — PAIN DESCRIPTION - PAIN TYPE: TYPE: CHRONIC PAIN

## 2021-12-22 NOTE — PROGRESS NOTES
Daily Treatment Note  Date: 2021  Patient Name: Delores Alexander  MRN: [id number]     :   1985    Subjective:   General  Referring Practitioner: ZEESHAN Yu  PT Visit Information  PT Insurance Information: BCBS (No precert req'd)  Total # of Visits Approved: 12  Total # of Visits to Date: 8  Plan of Care/Certification Expiration Date: 22  Progress Note Due Date: 21  Subjective  Subjective: My back is hurting this morning and is always hurting. Does get some better throughout the day but not much. Pain Screening  Patient Currently in Pain: Yes  Pain Assessment  Pain Level: 9  Pain Type: Chronic pain  Pain Location: Back;Leg;Hip  Pain Orientation: Right;Left;Lower  Pain Descriptors: Aching; Shooting;Radiating  Vital Signs  Patient Currently in Pain: Yes       Treatment Activities:     Exercises  Exercise 1: Assess leg length--: EVEN  : HEP ISSUED  Exercise 2: Isometric L hip extension from [address] 5 x 5 second hold (manually x 1) (NOT TODAY, EVEN)  Exercise 3: Isometric R hip flexion from 90/90 5 x 5 seconds (manually x 1) (NOT TODAY, EVEN)  Exercise 4: TA contraction (alone  with 5 second hold  x 10, UE x 10, LE x 10, UE/LE x 10)  Exercise 5: Pelvic tilt  x 10  Exercise 6: Hooklying lumbar rotation  10 x 5 sec  Exercise 7: Supine quadratus stretch x 4 with 10 second hold  Exercise 8: Supine piriformis stretch  x 3 with 10 second hold  Exercise 9: SKTC/DKTC  x 5 with 5 second hold  Exercise 10: Bridging/Single leg bridging L  x 10 each  Exercise 11: Contract/relax bilat HS and hip ER  x 4  Exercise 12: Axial traction bilat LE  x 4  10 second hold/ L LE rolling and IT band stretch x 10 min  12/13: trigger point in L gluteus min  Exercise 13: Sitting L hip retraction/R hip protraction  x 5 with 5 second  Exercise 14: Repeated sit to stand with TA contraction  x 5  Exercise 15: Sitting fwd reach (to R, to L) NOT TODAY  Exercise 16: Multifidus row/Paloff press NOT TODAY  Exercise 17: Standing hip abd/ext NOT TODAY  Exercise 18: Standing march NOT TODAY  Exercise 19: Standing glute squeeze NOT TODAY  Exercise 20: IFC + MH PRN for elevated pain  seated x 20 mins     Assessment:   Conditions Requiring Skilled Therapeutic Intervention  Body structures, Functions, Activity limitations: Decreased functional mobility ; Decreased sensation; Increased pain;Decreased posture;Decreased ROM; Decreased ADL status; Decreased strength;Decreased high-level IADLs  Assessment: Patient presenting with continued low back pain this date that remains unchanged since her previous tx and SOC. She shows noted increased mm tightness and pain in her L low back/hip with exercises this date with grimacing noted. Increased tightness in L piriformis and HS compared to her R. Decreased pain rating to a 7.5/10 after tx. Continued mm guarding noted with ambulation. Patient to be reassessed by PT at next visit.   Treatment Diagnosis: Low back pain  REQUIRES PT FOLLOW UP: Yes      Goals:  Short term goals  Time Frame for Short term goals: 3-4 weeks  Short term goal 1: Independent with HEP  Short term goal 2: Improve lumbar ROM to at least 50 degrees flexion, 25 degrees SB bilaterally  Short term goal 3: Perform 10 good TA contractions with good breath control  Short term goal 4: Improve bilat HS flexibility to at least 80 degrees  Long term goals  Time Frame for Long term goals : 4-6 weeks  Long term goal 1: Centralize LLE radicular symptoms to L buttocks and above  Long term goal 2: Report ability to tolerate at least 30 min of sitting w/o increased pain  Long term goal 3: Improve Oswestry score to 25% impairment or better  Patient Goals   Patient goals : reduce back and LLE pain    Plan:    Plan  Times per week: 2x  Plan weeks: 4-6 weeks  Current Treatment Recommendations: Strengthening,ROM,Home Exercise Program,Manual Therapy - Soft Tissue Mobilization,Manual Therapy - Joint Manipulation,Modalities,Patient/Caregiver Education & Training  Timed Code Treatment Minutes: 47 Minutes     Therapy Time   Individual Concurrent Group Co-treatment   Time In 1004         Time Out 9915         Minutes 74         Timed Code Treatment Minutes: 47 Minutes       Electronically signed by Tyrell Torres PTA on 12/22/2021 at 11:22 AM

## 2021-12-28 ENCOUNTER — HOSPITAL ENCOUNTER (OUTPATIENT)
Dept: PHYSICAL THERAPY | Age: 36
Setting detail: THERAPIES SERIES
Discharge: HOME | End: 2021-12-28
Payer: COMMERCIAL

## 2021-12-28 PROCEDURE — 97530 THERAPEUTIC ACTIVITIES: CPT

## 2021-12-28 PROCEDURE — 97110 THERAPEUTIC EXERCISES: CPT

## 2021-12-28 PROCEDURE — 97140 MANUAL THERAPY 1/> REGIONS: CPT

## 2021-12-28 PROCEDURE — G0283 ELEC STIM OTHER THAN WOUND: HCPCS

## 2021-12-28 RX ORDER — TIZANIDINE 4 MG/1
4 TABLET ORAL 2 TIMES DAILY PRN
Qty: 30 TABLET | Refills: 1 | Status: SHIPPED | OUTPATIENT
Start: 2021-12-28 | End: 2022-01-05

## 2021-12-28 ASSESSMENT — PAIN DESCRIPTION - PAIN TYPE: TYPE: CHRONIC PAIN

## 2021-12-28 ASSESSMENT — PAIN DESCRIPTION - LOCATION: LOCATION: LEG;FOOT

## 2021-12-28 ASSESSMENT — PAIN SCALES - GENERAL: PAINLEVEL_OUTOF10: 8

## 2021-12-28 ASSESSMENT — PAIN DESCRIPTION - DESCRIPTORS: DESCRIPTORS: RADIATING

## 2021-12-28 ASSESSMENT — PAIN DESCRIPTION - ORIENTATION: ORIENTATION: LEFT

## 2021-12-28 NOTE — PROGRESS NOTES
Physical Therapy  Daily Treatment Note  Date: 2021  Patient Name: Melany Royal  MRN: [id number]     :   1985    Subjective:   General  Chart Reviewed: Yes  Referring Practitioner: ZEESHAN Thornton  PT Visit Information  PT Insurance Information: BCBS (No precert req'd)  Total # of Visits Approved: 12  Total # of Visits to Date: 5  Plan of Care/Certification Expiration Date: 22  Progress Note Due Date: 22  Subjective  Subjective: \"i have pain all of the time\"  Pain 8 1/2/10. Most pain with all activities except right sidelying. She feels her condition is getting worse. She relates lateral left leg pain into left foot. Pain Screening  Patient Currently in Pain: Yes  Pain Assessment  Pain Level: 8  Pain Type: Chronic pain  Pain Location: Leg;Foot  Pain Orientation: Left  Pain Descriptors: Radiating  Vital Signs  Patient Currently in Pain: Yes       Treatment Activities:                              Spine  Lumbar: 26 degrees flexion(most painful), 10 degrees extension, 20 degrees left side bend, 25 degrees right side bend  Strength RLE  R Hip Flexion: 5/5  R Hip ABduction: 5/5  R Knee Flexion: 5/5  R Knee Extension: 5/5  R Ankle Dorsiflexion: 5/5  R Ankle Plantar flexion: 5/5  Strength LLE  L Hip Flexion: 5/5  L Hip ABduction: 5/5  L Knee Flexion: 5/5  L Knee Extension: 5/5  L Ankle Dorsiflexion: 5/5  L Ankle Plantar Flexion: 5/5  Strength Other  Other: Plank X 5 sec, limited by pain.   Exercises  Exercise 1: Assess leg length--: EVEN  : HEP ISSUED, not today  Exercise 2: Isometric L hip extension from [facility] 5 x 5 second hold (manually x 1) (NOT TODAY, EVEN)  Exercise 3: Isometric R hip flexion from 90/90 5 x 5 seconds (manually x 1) (NOT TODAY, EVEN)  Exercise 4: TA contraction (alone  with 5 second hold  x 10, UE x 10, LE x 10, UE/LE x 10) not today  Exercise 5: Pelvic tilt  x 10 not today  Exercise 6: Hooklying lumbar rotation  10 x 5 sec, not today  Exercise 7: Supine quadratus stretch x 4 with 10 second hold, not today  Exercise 8: Supine piriformis stretch  x 3 with 10 second hold, not today  Exercise 9: SKTC/DKTC  x 5 with 5 second hold, not today  Exercise 10: Bridging/Single leg bridging L  x 10 each, not today  Exercise 11: Contract/relax bilat HS and hip ER  x 4, not today  Exercise 12: Axial traction bilat LE  x 4  10 second hold/ L LE rolling and IT band stretch x 10 min  12/13: trigger point in L gluteus min, not today  Exercise 13: Sitting L hip retraction/R hip protraction  x 5 with 5 second, not today  Exercise 14: Repeated sit to stand with TA contraction  x 5, not today  Exercise 15: Sitting fwd reach (to R, to L) NOT TODAY  Exercise 16: Multifidus row/Paloff press NOT TODAY  Exercise 17: Standing hip abd/ext NOT TODAY  Exercise 18: Standing march NOT TODAY  Exercise 19: Standing glute squeeze NOT TODAY  Exercise 20: IFC + MH PRN for elevated pain  seated x 20 mins                               Assessment:   Conditions Requiring Skilled Therapeutic Intervention  Body structures, Functions, Activity limitations: Decreased functional mobility ; Decreased sensation; Increased pain;Decreased posture;Decreased ROM; Decreased ADL status; Decreased strength;Decreased high-level IADLs  Assessment: She has attended 9 PT sessions on a consistent basis. She has been treated with postural exercises, manual therapy techniques, and modalities as indicated. She relates no subjective improvement of her condition. Oswestry Disability Questionnaire demonstrates a 44% self-rated disability at initial evaluation and 54% today. Lumbar ROM is pain-limited in flexion and extension. She has symmetrical LE strength with slight left gastroc noted. She has diffuse lumbar tenderness at interspinous spaces, paraspinal mm, and glut mm. SLR and fem nerve stretch neg bilat. AJ/KJ 2+ bilat. There has been not subjective or objective improvement with this session of PT.   Treatment Diagnosis: Low back pain  Prognosis: Good  REQUIRES PT FOLLOW UP: Yes    Goals:  Short term goals  Time Frame for Short term goals: 3-4 weeks  Short term goal 1: Independent with HEP; Patient given HEP. Short term goal 2: Improve lumbar ROM to at least 50 degrees flexion, 25 degrees SB bilaterally; NOT MET, Lumbar flexion 26 degrees and pain-limited  Short term goal 3: Perform 10 good TA contractions with good breath control. Not assessed. Short term goal 4: Improve bilat HS flexibility to at least 80 degrees, Not assessed. Long term goals  Time Frame for Long term goals : 4-6 weeks  Long term goal 1: Centralize LLE radicular symptoms to L buttocks and above. NOT MET. She relates increased LLE pain into left foot. Long term goal 2: Report ability to tolerate at least 30 min of sitting w/o increased pain. NOT MET. Long term goal 3: Improve Oswestry score to 25% impairment or better. NOT MET. Oswestry Impairmet increase from 44% at initial eval to 54% today. Patient Goals   Patient goals : reduce back and LLE pain  Plan:    Plan  Times per week: 2x  Plan weeks: 4-6 weeks  Current Treatment Recommendations: Strengthening,ROM,Home Exercise Program,Manual Therapy - Soft Tissue Mobilization,Manual Therapy - Joint Manipulation,Modalities,Patient/Caregiver Education & Training  Plan Comment: While there has been not subjective/objective improvement she may benefit from continued PT to address postural faults/weakness if this option is available.   Timed Code Treatment Minutes: 54 Minutes     Therapy Time   Individual Concurrent Group Co-treatment   Time In 1471         Time Out 1110         Minutes 75         Timed Code Treatment Minutes: 55 Minutes  Electronically signed by Brenden Hdz PT on 12/28/2021 at 11:50 AM

## 2021-12-30 ENCOUNTER — OFFICE VISIT (OUTPATIENT)
Dept: URGENT CARE | Age: 36
End: 2021-12-30
Payer: COMMERCIAL

## 2021-12-30 VITALS
DIASTOLIC BLOOD PRESSURE: 98 MMHG | HEART RATE: 103 BPM | BODY MASS INDEX: 36.88 KG/M2 | OXYGEN SATURATION: 98 % | TEMPERATURE: 97.6 F | HEIGHT: 69 IN | SYSTOLIC BLOOD PRESSURE: 158 MMHG | WEIGHT: 249 LBS

## 2021-12-30 DIAGNOSIS — B34.9 VIRAL ILLNESS: Primary | ICD-10-CM

## 2021-12-30 LAB
INFLUENZA A ANTIBODY: NORMAL
INFLUENZA B ANTIBODY: NORMAL
SARS-COV-2, PCR: DETECTED

## 2021-12-30 PROCEDURE — 99213 OFFICE O/P EST LOW 20 MIN: CPT | Performed by: NURSE PRACTITIONER

## 2021-12-30 PROCEDURE — 87804 INFLUENZA ASSAY W/OPTIC: CPT | Performed by: NURSE PRACTITIONER

## 2021-12-30 RX ORDER — METHYLPREDNISOLONE 4 MG/1
TABLET ORAL
Qty: 1 KIT | Refills: 0 | Status: SHIPPED | OUTPATIENT
Start: 2021-12-30 | End: 2022-01-05 | Stop reason: ALTCHOICE

## 2021-12-30 ASSESSMENT — ENCOUNTER SYMPTOMS
SHORTNESS OF BREATH: 0
SORE THROAT: 1
WHEEZING: 0
COUGH: 1

## 2021-12-30 NOTE — PROGRESS NOTES
[address] [place] [address]   [address], [id number]     Phone:  [phone]  Fax:  [phone]      Shama Hernandez is a 29 y.o. female who presents today for her medical conditions/complaints as noted below. Shama Hernandez is c/o of Sinus Problem, Nasal Congestion, Cough, Pharyngitis, Headache, Generalized Body Aches, and Chills      Chief Complaint   Patient presents with    Sinus Problem    Nasal Congestion    Cough    Pharyngitis    Headache    Generalized Body Aches    Chills       HPI:   Shama Hernandez presents today for   Pharyngitis  This is a new problem. The current episode started in the past 7 days. The problem occurs constantly. The problem has been gradually worsening. Associated symptoms include chills, congestion, coughing, fatigue, myalgias and a sore throat. Pertinent negatives include no fever. The symptoms are aggravated by exertion. She has tried acetaminophen (mucinex) for the symptoms. The treatment provided no relief. Past Medical History:   Diagnosis Date    Hyperlipidemia     Hypertension     IBS (irritable bowel syndrome)     Migraines         Past Surgical History:   Procedure Laterality Date    KNEE ARTHROSCOPY Left     UPPER GASTROINTESTINAL ENDOSCOPY      WISDOM TOOTH EXTRACTION         Social History     Tobacco Use    Smoking status: Never Smoker    Smokeless tobacco: Never Used   Substance Use Topics    Alcohol use: Yes     Comment: occasion        Current Outpatient Medications   Medication Sig Dispense Refill    methylPREDNISolone (MEDROL DOSEPACK) 4 MG tablet Take by mouth.  1 kit 0    tiZANidine (ZANAFLEX) 4 MG tablet TAKE 1 TABLET BY MOUTH 2 TIMES DAILY AS NEEDED (BACK PAIN) 30 tablet 1    losartan (COZAAR) 25 MG tablet Take 1 tablet by mouth daily 30 tablet 0    dicyclomine (BENTYL) 10 MG capsule TAKE 1 CAPSULE BY MOUTH 3 TIMES DAILY AS NEEDED (ABDOMINAL CRAMPING) 270 capsule 1    diclofenac (VOLTAREN) 50 MG EC tablet TAKE 1 TABLET BY MOUTH 2 TIMES DAILY AS NEEDED FOR PAIN 60 tablet 1    cyclobenzaprine (FLEXERIL) 5 MG tablet Take 1 tablet by mouth 2 times daily as needed for Muscle spasms 30 tablet 0    buPROPion (WELLBUTRIN XL) 150 MG extended release tablet Take 1 tablet by mouth every morning 30 tablet 5    Norgestim-Eth Estrad Triphasic (ORTHO TRI-CYCLEN LO PO) Take by mouth      amitriptyline (ELAVIL) 10 MG tablet Take 10 mg by mouth nightly      SUMAtriptan (IMITREX) 25 MG tablet Take 25 mg by mouth once as needed for Migraine       No current facility-administered medications for this visit. Allergies   Allergen Reactions    Propofol Nausea And Vomiting       Family History   Problem Relation Age of Onset   Carmelina Aguayo Migraines Mother     Hypertension Father    Carmelina Aguayo Elevated Lipids Father     Lung Cancer Maternal Grandfather     Migraines Paternal Grandmother     Cancer Paternal Grandfather         mouth               Review of Systems   Constitutional: Positive for chills and fatigue. Negative for fever. HENT: Positive for congestion and sore throat. Respiratory: Positive for cough. Negative for shortness of breath and wheezing. Cardiovascular: Negative. Musculoskeletal: Positive for myalgias. Objective:     Physical Exam  Vitals and nursing note reviewed. Constitutional:       General: She is not in acute distress. Appearance: Normal appearance. She is well-developed. She is not ill-appearing, toxic-appearing or diaphoretic. HENT:      Head: Normocephalic and atraumatic. Right Ear: Tympanic membrane, ear canal and external ear normal. There is no impacted cerumen. Left Ear: Tympanic membrane, ear canal and external ear normal. There is no impacted cerumen. Nose: Congestion present. No rhinorrhea. Mouth/Throat:      Dentition: Normal dentition. Pharynx: Oropharynx is clear. No oropharyngeal exudate or posterior oropharyngeal erythema.    Eyes:      General:         Right eye: No discharge. Left eye: No discharge. Conjunctiva/sclera: Conjunctivae normal.   Cardiovascular:      Rate and Rhythm: Normal rate and regular rhythm. Pulmonary:      Effort: Pulmonary effort is normal. No respiratory distress. Breath sounds: Normal breath sounds. No stridor. No wheezing, rhonchi or rales. Musculoskeletal:         General: Normal range of motion. Cervical back: Normal range of motion and neck supple. Lumbar back: Normal range of motion. Lymphadenopathy:      Cervical: No cervical adenopathy. Skin:     General: Skin is warm and dry. Capillary Refill: Capillary refill takes less than 2 seconds. Neurological:      Mental Status: She is alert and oriented to person, place, and time. Psychiatric:         Mood and Affect: Mood normal.         Behavior: Behavior normal.         BP (!) 158/98   Pulse 103   Temp 97.6 °F (36.4 °C) (Temporal)   Ht 5' 9\" (1.753 m)   Wt 249 lb (112.9 kg)   SpO2 98%   BMI 36.77 kg/m²     Assessment:      Diagnosis Orders   1. Viral illness  POCT Influenza A/B    COVID-19    methylPREDNISolone (MEDROL DOSEPACK) 4 MG tablet       Results for orders placed or performed in visit on 12/30/21   POCT Influenza A/B   Result Value Ref Range    Influenza A Ab NEG     Influenza B Ab NEG        Plan:     Flu test was negative   Continue mucinex  Start steroid pack  COVID test today  Quarantine until results return    Return if symptoms worsen or fail to improve. Orders Placed This Encounter   Procedures    COVID-19     Scheduling Instructions:      1) Due to current limited availability of the COVID-19 test, tests will be prioritized based on responses to questions above. Testing may be delayed due to volume.             2) Print and instruct patient to adhere to CDC home isolation program. (Link Above)              3) Set up or refer patient for a monitoring program.              4) Have patient sign up for and leverage [employer] (if not

## 2021-12-30 NOTE — PATIENT INSTRUCTIONS
Patient Education   Flu test was negative   Continue mucinex  Start steroid pack  COVID test today  Quarantine until results return  Learning About COVID-19 and Flu Symptoms  How can you tell COVID-19 from the flu? COVID-19 and the flu have similar symptoms. The two can be hard to tell apart. The only way to know for sure which illness you have is to be tested. Since the symptoms are so alike, it makes sense to act as if you have COVID-19 until your test results come back. This means staying home and limiting contact with people in your home. You'll need to wash your hands often and disinfect surfaces that you touch. And be sure to wear a mask when you're around other people. This is also good advice if you think you have the flu. COVID-19 and the flu have these symptoms in common:  · Fever or chills  · Cough  · Shortness of breath  · Fatigue (tiredness)  · Sore throat  · Runny or stuffy nose  · Muscle and body aches  · Headache  · Vomiting and diarrhea (more common in children than adults)  COVID-19 has another symptom that also may occur:  · New loss of taste or smell  COVID-19 symptoms may appear from 2 to 14 days after infection. Flu symptoms usually appear 1 to 4 days after infection. Why should you get a flu shot during the COVID-19 pandemic? It's important to get your yearly flu vaccine. Both the flu and COVID-19 can be active at the same time. You can get sick with both infections at once. And having both may make you more sick than getting just one. The flu vaccine won't protect you from COVID-19. But it can help prevent the flu or reduce its symptoms. If fewer people get very ill with the flu, this will help free up medical resources that are needed for COVID-19 patients. Where can you learn more? Go to https://[employer]. org and sign in to your [email or web address] account. Enter C123 in the [employer] box to learn more about \"Learning About COVID-19 and Flu Symptoms. \" If you do not have an account, please click on the \"Sign Up Now\" link. Current as of: March 26, 2021               Content Version: 13.1  © 4066-4719 Healthwise, [facility]. Care instructions adapted under license by [employer]. If you have questions about a medical condition or this instruction, always ask your healthcare professional. [address] any warranty or liability for your use of this information.

## 2022-01-05 ENCOUNTER — TELEMEDICINE (OUTPATIENT)
Dept: FAMILY MEDICINE CLINIC | Age: 37
End: 2022-01-05
Payer: COMMERCIAL

## 2022-01-05 DIAGNOSIS — M54.42 CHRONIC LEFT-SIDED LOW BACK PAIN WITH LEFT-SIDED SCIATICA: Primary | ICD-10-CM

## 2022-01-05 DIAGNOSIS — M54.16 LUMBAR RADICULOPATHY: ICD-10-CM

## 2022-01-05 DIAGNOSIS — U07.1 COVID-19 VIRUS INFECTION: ICD-10-CM

## 2022-01-05 DIAGNOSIS — G89.29 CHRONIC LEFT-SIDED LOW BACK PAIN WITH LEFT-SIDED SCIATICA: Primary | ICD-10-CM

## 2022-01-05 PROCEDURE — 99443 PR PHYS/QHP TELEPHONE EVALUATION 21-30 MIN: CPT | Performed by: NURSE PRACTITIONER

## 2022-01-05 RX ORDER — FLUTICASONE PROPIONATE 50 MCG
2 SPRAY, SUSPENSION (ML) NASAL DAILY
Qty: 16 G | Refills: 5 | Status: SHIPPED | OUTPATIENT
Start: 2022-01-05

## 2022-01-05 RX ORDER — BENZONATATE 200 MG/1
200 CAPSULE ORAL 3 TIMES DAILY PRN
Qty: 30 CAPSULE | Refills: 0 | Status: SHIPPED | OUTPATIENT
Start: 2022-01-05 | End: 2022-01-12

## 2022-01-05 RX ORDER — DEXTROMETHORPHAN HYDROBROMIDE AND PROMETHAZINE HYDROCHLORIDE 15; 6.25 MG/5ML; MG/5ML
5 SYRUP ORAL NIGHTLY PRN
Qty: 120 ML | Refills: 0 | Status: SHIPPED | OUTPATIENT
Start: 2022-01-05 | End: 2022-06-28

## 2022-01-05 RX ORDER — CYCLOBENZAPRINE HCL 10 MG
10 TABLET ORAL 2 TIMES DAILY PRN
Qty: 60 TABLET | Refills: 3 | Status: SHIPPED | OUTPATIENT
Start: 2022-01-05 | End: 2022-05-23

## 2022-01-05 RX ORDER — TRAMADOL HYDROCHLORIDE 50 MG/1
50 TABLET ORAL EVERY 6 HOURS PRN
Qty: 12 TABLET | Refills: 0 | Status: SHIPPED | OUTPATIENT
Start: 2022-01-05 | End: 2022-02-08

## 2022-01-05 RX ORDER — ALBUTEROL SULFATE 90 UG/1
2 AEROSOL, METERED RESPIRATORY (INHALATION) 4 TIMES DAILY PRN
Qty: 18 G | Refills: 0 | Status: SHIPPED | OUTPATIENT
Start: 2022-01-05 | End: 2022-06-29 | Stop reason: ALTCHOICE

## 2022-01-05 RX ORDER — DICLOFENAC SODIUM 75 MG/1
75 TABLET, DELAYED RELEASE ORAL 2 TIMES DAILY PRN
Qty: 60 TABLET | Refills: 3 | Status: SHIPPED | OUTPATIENT
Start: 2022-01-05

## 2022-01-05 ASSESSMENT — ENCOUNTER SYMPTOMS
COUGH: 0
SHORTNESS OF BREATH: 0
ABDOMINAL PAIN: 0
SORE THROAT: 1
BACK PAIN: 1
DIARRHEA: 0
WHEEZING: 0
CHEST TIGHTNESS: 0
NAUSEA: 0
VOMITING: 0
SINUS PRESSURE: 1

## 2022-01-05 NOTE — PROGRESS NOTES
2022    TELEHEALTH EVALUATION -- Audio/Visual (During [id number] public health emergency)    HPI:    Oswaldo East (:  1985) has requested an audio/video evaluation for the following concern(s):    Attempted VV but had technical difficulty. Transitioned to phone visit. She tested positive for covid on . She developed symptoms on  with cough, congestion, chills, sore throat. No fever. Symptoms progressively worsened. She was treated with MDP which she completed today with no significant improvement. Mucus has been clear. Still with nasal congestion, postnasal drainage. She lost her taste and smell a few days ago. She has tried mucinex and dayquil for cough, not helping. She has had both doses of covid vaccine but has not had her booster yet. Last vaccine >6 months. She has chronic lower back pain which has progressively worsened. She has radiating pain down the L leg which has gotten worse. Pain is constant but worsened by prolonged standing or walking. Lumbar XR was normal in Oct.  She completed 6 weeks of PT with no improvement, now actually worse. She is taking diclofenac 50 mg BID, tizanidine at night due to sedation. Pain worse since home with covid. She has tried icy hot, stretches, massage gun. Review of Systems   Constitutional: Positive for chills. Negative for fever. HENT: Positive for congestion, postnasal drip, sinus pressure and sore throat. Negative for ear pain. Respiratory: Negative for cough, chest tightness, shortness of breath and wheezing. Cardiovascular: Negative for chest pain. Gastrointestinal: Negative for abdominal pain, diarrhea, nausea and vomiting. Musculoskeletal: Positive for back pain. Negative for arthralgias and myalgias. Skin: Negative for rash. Prior to Visit Medications    Medication Sig Taking?  Authorizing Provider   promethazine-dextromethorphan (PROMETHAZINE-DM) 6.25-15 MG/5ML syrup Take 5 mLs by mouth nightly as needed for Cough Yes ZEESHAN Weaver   benzonatate (TESSALON) 200 MG capsule Take 1 capsule by mouth 3 times daily as needed for Cough Yes [place] Art, APRN   fluticasone (FLONASE) 50 MCG/ACT nasal spray 2 sprays by Each Nostril route daily Yes [place] Art, APRN   diclofenac (VOLTAREN) 75 MG EC tablet Take 1 tablet by mouth 2 times daily as needed for Pain Yes [place] Art, APRN   cyclobenzaprine (FLEXERIL) 10 MG tablet Take 1 tablet by mouth 2 times daily as needed for Muscle spasms Yes Dusty Art, APRN   albuterol sulfate HFA (VENTOLIN HFA) 108 (90 Base) MCG/ACT inhaler Inhale 2 puffs into the lungs 4 times daily as needed for Wheezing Yes [place] Art, APRN   traMADol (ULTRAM) 50 MG tablet Take 1 tablet by mouth every 6 hours as needed for Pain for up to 3 days. Intended supply: 3 days.  Take lowest dose possible to manage pain Yes ZEESHAN Weaver   losartan (COZAAR) 25 MG tablet Take 1 tablet by mouth daily  [place] Art, APRSAKINA   dicyclomine (BENTYL) 10 MG capsule TAKE 1 CAPSULE BY MOUTH 3 TIMES DAILY AS NEEDED (ABDOMINAL CRAMPING)  ZEESHAN Weaver   buPROPion (WELLBUTRIN XL) 150 MG extended release tablet Take 1 tablet by mouth every morning  [place] Art, APRN   Norgestim-Eth Estrad Triphasic (ORTHO TRI-CYCLEN LO PO) Take by mouth  Historical Provider, MD   amitriptyline (ELAVIL) 10 MG tablet Take 10 mg by mouth nightly  Historical Provider, MD   SUMAtriptan (IMITREX) 25 MG tablet Take 25 mg by mouth once as needed for Migraine  Historical Provider, MD       Social History     Tobacco Use    Smoking status: Never Smoker    Smokeless tobacco: Never Used   Vaping Use    Vaping Use: Never used   Substance Use Topics    Alcohol use: Yes     Comment: occasion    Drug use: Never        Allergies   Allergen Reactions    Propofol Nausea And Vomiting   ,   Past Medical History:   Diagnosis Date    Hyperlipidemia  Hypertension     IBS (irritable bowel syndrome)     Migraines    ,   Past Surgical History:   Procedure Laterality Date    KNEE ARTHROSCOPY Left     UPPER GASTROINTESTINAL ENDOSCOPY      WISDOM TOOTH EXTRACTION     ,   Social History     Tobacco Use    Smoking status: Never Smoker    Smokeless tobacco: Never Used   Vaping Use    Vaping Use: Never used   Substance Use Topics    Alcohol use: Yes     Comment: occasion    Drug use: Never   ,   Family History   Problem Relation Age of Onset   Nathan Migraines Mother     Hypertension Father     Elevated Lipids Father     Lung Cancer Maternal Grandfather     Migraines Paternal Grandmother     Cancer Paternal Grandfather         mouth       PHYSICAL EXAMINATION:  [ INSTRUCTIONS:  \"[x]\" Indicates a positive item  \"[]\" Indicates a negative item  -- DELETE ALL ITEMS NOT EXAMINED]  Vital Signs: (As obtained by patient/caregiver or practitioner observation)    Blood pressure-  Heart rate-    Respiratory rate-    Temperature-  Pulse oximetry-     Constitutional: [] Appears well-developed and well-nourished [] No apparent distress      [] Abnormal-   Mental status  [] Alert and awake  [] Oriented to person/place/time []Able to follow commands      Eyes:  EOM    []  Normal  [] Abnormal-  Sclera  []  Normal  [] Abnormal -         Discharge []  None visible  [] Abnormal -    HENT:   [] Normocephalic, atraumatic.   [] Abnormal   [] Mouth/Throat: Mucous membranes are moist.     External Ears [] Normal  [] Abnormal-     Neck: [] No visualized mass     Pulmonary/Chest: [] Respiratory effort normal.  [] No visualized signs of difficulty breathing or respiratory distress        [] Abnormal-      Musculoskeletal:   [] Normal gait with no signs of ataxia         [] Normal range of motion of neck        [] Abnormal-       Neurological:        [] No Facial Asymmetry (Cranial nerve 7 motor function) (limited exam to video visit)          [] No gaze palsy        [] Abnormal- Skin:        [] No significant exanthematous lesions or discoloration noted on facial skin         [] Abnormal-            Psychiatric:       [] Normal Affect [] No Hallucinations        [] Abnormal-     Other pertinent observable physical exam findings-     ASSESSMENT/PLAN:  1. COVID-19 virus infection  -Tessalon 200 mg 3 times daily as needed for cough  -I will send Promethazine DM to take at night  -Flonase 2 sprays per nostril daily  -Albuterol inhaler to use as needed for chest tightness, wheeze  -Quarantine guidelines reviewed-    2. Chronic left-sided low back pain with left-sided sciatica  -MRI lumbar spine due to worsening back pain, failed treatment with PT  -Increase diclofenac to 75 mg twice daily as needed  -Cyclobenzaprine 10 mg twice daily as needed. Discontinue tizanidine  -Brief Rx for tramadol 50 mg every 6 hours as needed for pain x3 days. - traMADol (ULTRAM) 50 MG tablet; Take 1 tablet by mouth every 6 hours as needed for Pain for up to 3 days. Intended supply: 3 days. Take lowest dose possible to manage pain  Dispense: 12 tablet; Refill: 0  - MRI LUMBAR SPINE WO CONTRAST; Future    3. Lumbar radiculopathy  -as above  - MRI LUMBAR SPINE WO CONTRAST; Future      Return in about 2 weeks (around 1/19/2022) for follow up. Surjit Matthews, was evaluated through a synchronous (real-time) audio-video encounter. The patient (or guardian if applicable) is aware that this is a billable service. Verbal consent to proceed has been obtained within the past 12 months. The visit was conducted pursuant to the emergency declaration under the [address] [place] [address], [address] [place] [address] authority and the [employer] General Act. Patient identification was verified, and a caregiver was present when appropriate. The patient was located in a state where the provider was credentialed to provide care.     Total time spent on this encounter: 22

## 2022-01-11 ENCOUNTER — HOSPITAL ENCOUNTER (OUTPATIENT)
Dept: ULTRASOUND IMAGING | Age: 37
Discharge: HOME | End: 2022-01-11
Payer: COMMERCIAL

## 2022-01-11 DIAGNOSIS — N92.1 MENORRHAGIA WITH IRREGULAR CYCLE: ICD-10-CM

## 2022-01-11 PROCEDURE — 76830 TRANSVAGINAL US NON-OB: CPT

## 2022-01-12 RX ORDER — LOSARTAN POTASSIUM 25 MG/1
TABLET ORAL
Qty: 30 TABLET | Refills: 0 | Status: SHIPPED | OUTPATIENT
Start: 2022-01-12 | End: 2022-02-09

## 2022-01-13 ENCOUNTER — HOSPITAL ENCOUNTER (OUTPATIENT)
Dept: MRI IMAGING | Age: 37
Discharge: HOME | End: 2022-01-13
Payer: COMMERCIAL

## 2022-01-13 DIAGNOSIS — M54.42 CHRONIC LEFT-SIDED LOW BACK PAIN WITH LEFT-SIDED SCIATICA: ICD-10-CM

## 2022-01-13 DIAGNOSIS — M54.16 LUMBAR RADICULOPATHY: ICD-10-CM

## 2022-01-13 DIAGNOSIS — G89.29 CHRONIC LEFT-SIDED LOW BACK PAIN WITH LEFT-SIDED SCIATICA: ICD-10-CM

## 2022-01-13 PROCEDURE — 72148 MRI LUMBAR SPINE W/O DYE: CPT

## 2022-01-13 RX ORDER — TIZANIDINE 4 MG/1
4 TABLET ORAL 2 TIMES DAILY PRN
Qty: 30 TABLET | Refills: 1 | Status: SHIPPED | OUTPATIENT
Start: 2022-01-13 | End: 2022-06-29 | Stop reason: ALTCHOICE

## 2022-01-14 DIAGNOSIS — M51.16 LUMBAR DISC HERNIATION WITH RADICULOPATHY: ICD-10-CM

## 2022-01-14 DIAGNOSIS — M54.42 CHRONIC LEFT-SIDED LOW BACK PAIN WITH LEFT-SIDED SCIATICA: Primary | ICD-10-CM

## 2022-01-14 DIAGNOSIS — G89.29 CHRONIC LEFT-SIDED LOW BACK PAIN WITH LEFT-SIDED SCIATICA: Primary | ICD-10-CM

## 2022-01-20 DIAGNOSIS — G89.29 CHRONIC LEFT-SIDED LOW BACK PAIN WITH LEFT-SIDED SCIATICA: Primary | ICD-10-CM

## 2022-01-20 DIAGNOSIS — M54.42 CHRONIC LEFT-SIDED LOW BACK PAIN WITH LEFT-SIDED SCIATICA: Primary | ICD-10-CM

## 2022-01-20 DIAGNOSIS — M51.16 LUMBAR DISC HERNIATION WITH RADICULOPATHY: ICD-10-CM

## 2022-02-09 RX ORDER — LOSARTAN POTASSIUM 25 MG/1
TABLET ORAL
Qty: 30 TABLET | Refills: 0 | Status: SHIPPED | OUTPATIENT
Start: 2022-02-09 | End: 2022-03-21

## 2022-02-09 NOTE — TELEPHONE ENCOUNTER
Boyd Sportsman called to request a refill on her medication.       Last office visit : 1/5/2022   Next office visit : Visit date not found     Requested Prescriptions     Pending Prescriptions Disp Refills    losartan (COZAAR) 25 MG tablet [Pharmacy Med Name: LOSARTAN POTASSIUM 25 MG TAB] 30 tablet 0     Sig: TAKE 1 TABLET BY MOUTH EVERY DAY            Duc Garner MA

## 2022-02-11 NOTE — PROGRESS NOTES
[facility] Outpatient Physical Therapy  Discharge Summary        Date:2022    Patient Annette Paez    [id number]    :1985    Diagnosis:Diagnosis: Low back pain       Total # of Visits to Date: 9     D/C ASSESSMENT: Pt attended 9 total PT visits, typically with increased pain and symptoms after. No real improvements in level of pain or function. She has not returned for further visits and it appears that she has been referred to neurosurgery as well. Will d/c from formal services at this time. GOALS:    Short term goals  Time Frame for Short term goals: 3-4 weeks  Short term goal 1: Independent with HEP; Patient given HEP. Short term goal 2: Improve lumbar ROM to at least 50 degrees flexion, 25 degrees SB bilaterally; NOT MET, Lumbar flexion 26 degrees and pain-limited  Short term goal 3: Perform 10 good TA contractions with good breath control. Not assessed. Short term goal 4: Improve bilat HS flexibility to at least 80 degrees, Not assessed. Long term goals  Time Frame for Long term goals : 4-6 weeks  Long term goal 1: Centralize LLE radicular symptoms to L buttocks and above. NOT MET. She relates increased LLE pain into left foot. Long term goal 2: Report ability to tolerate at least 30 min of sitting w/o increased pain. NOT MET. Long term goal 3: Improve Oswestry score to 25% impairment or better. NOT MET. Oswestry Impairmet increase from 44% at initial eval to 54% today.       PLAN:  D/c from formal services      Electronically signed by Dara Mcintyre PT on 2022 at 1:57 PM

## 2022-03-04 ENCOUNTER — PATIENT MESSAGE (OUTPATIENT)
Dept: FAMILY MEDICINE CLINIC | Age: 37
End: 2022-03-04

## 2022-03-04 RX ORDER — AMITRIPTYLINE HYDROCHLORIDE 10 MG/1
10 TABLET, FILM COATED ORAL NIGHTLY
Qty: 30 TABLET | Refills: 2 | Status: SHIPPED | OUTPATIENT
Start: 2022-03-04 | End: 2022-06-06

## 2022-03-04 NOTE — TELEPHONE ENCOUNTER
From: Verena Torrez  To: Arnel Joseph  Sent: 3/4/2022 2:36 PM CST  Subject: Refill     I ran out of my amitriptyline last night and am in need of a refill.

## 2022-03-11 ENCOUNTER — OFFICE VISIT (OUTPATIENT)
Dept: FAMILY MEDICINE CLINIC | Age: 37
End: 2022-03-11
Payer: COMMERCIAL

## 2022-03-11 VITALS
BODY MASS INDEX: 38.83 KG/M2 | OXYGEN SATURATION: 98 % | WEIGHT: 262.2 LBS | HEART RATE: 104 BPM | HEIGHT: 69 IN | SYSTOLIC BLOOD PRESSURE: 138 MMHG | TEMPERATURE: 96.9 F | DIASTOLIC BLOOD PRESSURE: 86 MMHG

## 2022-03-11 DIAGNOSIS — M54.42 CHRONIC LEFT-SIDED LOW BACK PAIN WITH LEFT-SIDED SCIATICA: Primary | ICD-10-CM

## 2022-03-11 DIAGNOSIS — K21.9 GASTROESOPHAGEAL REFLUX DISEASE WITHOUT ESOPHAGITIS: ICD-10-CM

## 2022-03-11 DIAGNOSIS — M51.16 LUMBAR DISC HERNIATION WITH RADICULOPATHY: ICD-10-CM

## 2022-03-11 DIAGNOSIS — I10 PRIMARY HYPERTENSION: ICD-10-CM

## 2022-03-11 DIAGNOSIS — R63.5 WEIGHT GAIN: ICD-10-CM

## 2022-03-11 DIAGNOSIS — G89.29 CHRONIC LEFT-SIDED LOW BACK PAIN WITH LEFT-SIDED SCIATICA: Primary | ICD-10-CM

## 2022-03-11 PROCEDURE — 96372 THER/PROPH/DIAG INJ SC/IM: CPT | Performed by: NURSE PRACTITIONER

## 2022-03-11 PROCEDURE — 99214 OFFICE O/P EST MOD 30 MIN: CPT | Performed by: NURSE PRACTITIONER

## 2022-03-11 RX ORDER — PANTOPRAZOLE SODIUM 20 MG/1
20 TABLET, DELAYED RELEASE ORAL
Qty: 30 TABLET | Refills: 2 | Status: SHIPPED | OUTPATIENT
Start: 2022-03-11

## 2022-03-11 RX ORDER — TRAMADOL HYDROCHLORIDE 50 MG/1
50 TABLET ORAL 2 TIMES DAILY PRN
Qty: 14 TABLET | Refills: 0 | Status: SHIPPED | OUTPATIENT
Start: 2022-03-11 | End: 2022-03-18

## 2022-03-11 RX ORDER — DEXAMETHASONE SODIUM PHOSPHATE 100 MG/10ML
10 INJECTION INTRAMUSCULAR; INTRAVENOUS ONCE
Status: COMPLETED | OUTPATIENT
Start: 2022-03-11 | End: 2022-03-11

## 2022-03-11 RX ADMIN — DEXAMETHASONE SODIUM PHOSPHATE 10 MG: 100 INJECTION INTRAMUSCULAR; INTRAVENOUS at 13:31

## 2022-03-11 ASSESSMENT — ENCOUNTER SYMPTOMS
BACK PAIN: 1
CHEST TIGHTNESS: 0
DIARRHEA: 0
NAUSEA: 0
ABDOMINAL PAIN: 0
WHEEZING: 0
SHORTNESS OF BREATH: 0
SORE THROAT: 0
COUGH: 0

## 2022-03-11 ASSESSMENT — PATIENT HEALTH QUESTIONNAIRE - PHQ9
SUM OF ALL RESPONSES TO PHQ9 QUESTIONS 1 & 2: 0
1. LITTLE INTEREST OR PLEASURE IN DOING THINGS: 0
SUM OF ALL RESPONSES TO PHQ QUESTIONS 1-9: 0
2. FEELING DOWN, DEPRESSED OR HOPELESS: 0
SUM OF ALL RESPONSES TO PHQ QUESTIONS 1-9: 0

## 2022-03-11 NOTE — PROGRESS NOTES
Indiana Casarez is a 29 y.o. female who presents today for  Chief Complaint   Patient presents with    Follow-up       HPI:  She has had issues with chronic lower back pain with radiculopathy to left leg. She completed 6 weeks of PT with no improvement. MRI was obtained in January showing disc herniation at L5-S1. She was seen by [facility] neurosurgery, diagnosed with disc rupture at L5-S1. Recommended epidural steroid injection so she was referred to  pain management. She is scheduled for injection on 3/18. She is taking diclofenac twice daily, cyclobenzaprine twice daily and tolerating well. Pain was fairly controlled until about a week ago and has had worsening pain in L leg and partially in R buttock. No recent injury but she had been traveling in the car for 4 hours and went to a basketball game where she was up and down in her seat. She was treated briefly in the past with tramadol and has been taking this sparingly with some improvement. She mentions that she has had some issues with dry mouth. She has had some dysphagia and cough. No significant GERD. She took omeprazole in the remote past but could not take daily due to causing dry mouth. She has had progressive weight gain despite changing diet. She has been unable to exercise much due to her back pain. Weight is up about 12 pounds since December. Review of Systems   Constitutional: Negative for chills and fever. HENT: Negative for congestion, ear pain and sore throat. Respiratory: Negative for cough, chest tightness, shortness of breath and wheezing. Cardiovascular: Negative for chest pain. Gastrointestinal: Negative for abdominal pain, diarrhea and nausea. Musculoskeletal: Positive for back pain. Negative for arthralgias and myalgias. Skin: Negative for rash.        Past Medical History:   Diagnosis Date    Hyperlipidemia     Hypertension     IBS (irritable bowel syndrome)     Migraines        Current Outpatient Medications   Medication Sig Dispense Refill    pantoprazole (PROTONIX) 20 MG tablet Take 1 tablet by mouth every morning (before breakfast) 30 tablet 2    traMADol (ULTRAM) 50 MG tablet Take 1 tablet by mouth 2 times daily as needed for Pain for up to 7 days. 14 tablet 0    amitriptyline (ELAVIL) 10 MG tablet Take 1 tablet by mouth nightly 30 tablet 2    losartan (COZAAR) 25 MG tablet TAKE 1 TABLET BY MOUTH EVERY DAY 30 tablet 0    Levonorgest-Eth Estrad-Fe Bisg 0.1-20 MG-MCG(21) TABS Take 1 tablet daily 28 tablet 5    tiZANidine (ZANAFLEX) 4 MG tablet TAKE 1 TABLET BY MOUTH 2 TIMES DAILY AS NEEDED (BACK PAIN) 30 tablet 1    fluticasone (FLONASE) 50 MCG/ACT nasal spray 2 sprays by Each Nostril route daily 16 g 5    diclofenac (VOLTAREN) 75 MG EC tablet Take 1 tablet by mouth 2 times daily as needed for Pain 60 tablet 3    cyclobenzaprine (FLEXERIL) 10 MG tablet Take 1 tablet by mouth 2 times daily as needed for Muscle spasms 60 tablet 3    albuterol sulfate HFA (VENTOLIN HFA) 108 (90 Base) MCG/ACT inhaler Inhale 2 puffs into the lungs 4 times daily as needed for Wheezing 18 g 0    dicyclomine (BENTYL) 10 MG capsule TAKE 1 CAPSULE BY MOUTH 3 TIMES DAILY AS NEEDED (ABDOMINAL CRAMPING) 270 capsule 1    buPROPion (WELLBUTRIN XL) 150 MG extended release tablet Take 1 tablet by mouth every morning 30 tablet 5    SUMAtriptan (IMITREX) 100 MG tablet Take 1 tablet by mouth once as needed for Migraine . May repeat in 2 hours if needed. Do not exceed 2 tablets in 24 hours. 9 tablet 2    promethazine-dextromethorphan (PROMETHAZINE-DM) 6.25-15 MG/5ML syrup Take 5 mLs by mouth nightly as needed for Cough (Patient not taking: Reported on 3/11/2022) 120 mL 0     No current facility-administered medications for this visit.        Allergies   Allergen Reactions    Propofol Nausea And Vomiting       Past Surgical History:   Procedure Laterality Date    KNEE ARTHROSCOPY Left     UPPER GASTROINTESTINAL left-sided sciatica  -Dexamethasone 10 mg IM  -Continue diclofenac twice daily as needed. May add Tylenol if needed.  -Continue cyclobenzaprine twice daily as needed. Discussed that her dry mouth symptoms are likely due to this medication.  -Tramadol 50 mg twice daily as needed for up to 7 days. If she needs a more prolonged Rx we will obtain UDS, controlled Rx contract.  -Continue with epidural injection as scheduled per pain management. - dexamethasone (DECADRON) injection 10 mg  - traMADol (ULTRAM) 50 MG tablet; Take 1 tablet by mouth 2 times daily as needed for Pain for up to 7 days. Dispense: 14 tablet; Refill: 0    2. Lumbar disc herniation with radiculopathy  -As above    3. Primary hypertension  -Stable, controlled. Continue current medication. 4. Gastroesophageal reflux disease without esophagitis  -Trial of Protonix 20 mg daily due to recent dysphagia, cough. If intolerant, treat with famotidine 20 mg twice daily. 5. Weight gain  -Check lab as below.  -Discussed phentermine. SE profile reviewed. We will check labs first.  If no unusual findings, she can start phentermine. Discussed treatment of 3 months, no longer than 6 months.  -Advised to continue to work on dietary changes, portion control, exercise as able with back pain. - T4, Free; Future  - TSH; Future  - Comprehensive Metabolic Panel; Future  - CBC with Auto Differential; Future         Return in about 6 weeks (around 4/22/2022) for follow up, 30 minute visit. Cornelia Blandon was seen today for follow-up. Diagnoses and all orders for this visit:    Chronic left-sided low back pain with left-sided sciatica  -     dexamethasone (DECADRON) injection 10 mg  -     traMADol (ULTRAM) 50 MG tablet; Take 1 tablet by mouth 2 times daily as needed for Pain for up to 7 days.     Lumbar disc herniation with radiculopathy    Primary hypertension    Gastroesophageal reflux disease without esophagitis    Weight gain  -     T4, Free; Future  - TSH; Future  -     Comprehensive Metabolic Panel; Future  -     CBC with Auto Differential; Future    Other orders  -     pantoprazole (PROTONIX) 20 MG tablet; Take 1 tablet by mouth every morning (before breakfast)      There are no discontinued medications. There are no Patient Instructions on file for this visit. Patient voicesunderstanding and agrees to plans along with risks and benefits of plan. Counseling:  Ramon Patel Putnam's case, medications and options were discussed in detail. Patient was instructed to call the office if she questionsregarding her treatment. Should her conditions worsen, she should return to office to be reassessed by ZEESHAN Carr. she Should to go the closest Emergency Department for any emergency. They verbalizedunderstanding the above instructions. Return in about 6 weeks (around 4/22/2022) for follow up, 30 minute visit.

## 2022-03-11 NOTE — PROGRESS NOTES
After obtaining consent, and per orders of Chiqui Brooks, injection of Dexametyhasone 10mg given in left hip by Lizzette Berger MA. Patient instructed to remain in clinic for 20 minutes afterwards, and to report any adverse reaction to me immediately.

## 2022-03-21 RX ORDER — LOSARTAN POTASSIUM 25 MG/1
TABLET ORAL
Qty: 30 TABLET | Refills: 0 | Status: SHIPPED | OUTPATIENT
Start: 2022-03-21 | End: 2022-04-19

## 2022-03-25 DIAGNOSIS — F41.9 ANXIETY: ICD-10-CM

## 2022-03-25 RX ORDER — BUPROPION HYDROCHLORIDE 150 MG/1
TABLET ORAL
Qty: 30 TABLET | Refills: 5 | Status: SHIPPED | OUTPATIENT
Start: 2022-03-25

## 2022-03-28 DIAGNOSIS — G89.29 CHRONIC LEFT-SIDED LOW BACK PAIN WITH LEFT-SIDED SCIATICA: ICD-10-CM

## 2022-03-28 DIAGNOSIS — M54.42 CHRONIC LEFT-SIDED LOW BACK PAIN WITH LEFT-SIDED SCIATICA: ICD-10-CM

## 2022-03-28 RX ORDER — DICLOFENAC SODIUM 75 MG/1
75 TABLET, DELAYED RELEASE ORAL 2 TIMES DAILY
Qty: 60 TABLET | Refills: 1 | Status: SHIPPED | OUTPATIENT
Start: 2022-03-28 | End: 2022-06-28

## 2022-03-29 RX ORDER — TRAMADOL HYDROCHLORIDE 50 MG/1
50 TABLET ORAL 2 TIMES DAILY PRN
Qty: 14 TABLET | Refills: 0 | Status: SHIPPED | OUTPATIENT
Start: 2022-03-29 | End: 2022-04-05

## 2022-03-29 NOTE — TELEPHONE ENCOUNTER
[place] called requesting a refill of the below medication which has been pended for you:     Requested Prescriptions     Pending Prescriptions Disp Refills    traMADol (ULTRAM) 50 MG tablet [Pharmacy Med Name: TRAMADOL HCL 50 MG TABLET] 12 tablet 0     Sig: TAKE 1 TABLET BY MOUTH EVERY 6HRS AS NEEDED FOR PAIN FOR UP TO 3 DAYS.  TAKE LOWEST DOSE POSSIBLE       Last Appointment Date: 3/11/2022  Next Appointment Date: 4/22/2022   Ref Range & Units 9 mo ago   Amphetamine Screen, Urine Negative <1000 ng/mL Negative    Barbiturate Screen, Ur Negative < 200 ng/mL Negative    Benzodiazepine Screen, Urine Negative <100 ng/mL Negative    Cannabinoid Scrn, Ur Negative <50 ng/mL Negative    Cocaine Metabolite Screen, Urine Negative <300 ng/mL Negative    Opiate Scrn, Ur Negative < 300 ng/mL Negative    Drug Screen Comment:  see below        Allergies   Allergen Reactions    Propofol Nausea And Vomiting

## 2022-03-29 NOTE — TELEPHONE ENCOUNTER
I have sent in the rx for twice daily as needed x 7 days. Please let her know that if we need to continue the rx, we will have to do a controlled rx contract and UDS since it is a controlled med.

## 2022-04-19 RX ORDER — LOSARTAN POTASSIUM 25 MG/1
TABLET ORAL
Qty: 30 TABLET | Refills: 0 | Status: SHIPPED | OUTPATIENT
Start: 2022-04-19 | End: 2022-05-20

## 2022-05-20 RX ORDER — LOSARTAN POTASSIUM 25 MG/1
TABLET ORAL
Qty: 30 TABLET | Refills: 2 | Status: SHIPPED | OUTPATIENT
Start: 2022-05-20 | End: 2022-06-29 | Stop reason: ALTCHOICE

## 2022-05-20 NOTE — TELEPHONE ENCOUNTER
[facility] called requesting a refill of the below medication which has been pended for you:     Requested Prescriptions     Pending Prescriptions Disp Refills    losartan (COZAAR) 25 MG tablet [Pharmacy Med Name: LOSARTAN POTASSIUM 25 MG TAB] 30 tablet 0     Sig: TAKE 1 TABLET BY MOUTH EVERY DAY       Last Appointment Date: 3/11/2022  Next Appointment Date: Visit date not found    Allergies   Allergen Reactions    Propofol Nausea And Vomiting       Can you send back so I can get her a f/u  Thanks

## 2022-05-23 RX ORDER — CYCLOBENZAPRINE HCL 10 MG
10 TABLET ORAL 2 TIMES DAILY PRN
Qty: 60 TABLET | Refills: 1 | Status: SHIPPED | OUTPATIENT
Start: 2022-05-23 | End: 2022-08-29 | Stop reason: SDUPTHER

## 2022-05-23 NOTE — TELEPHONE ENCOUNTER
[facility] called requesting a refill of the below medication which has been pended for you:     Requested Prescriptions     Pending Prescriptions Disp Refills    cyclobenzaprine (FLEXERIL) 10 MG tablet [Pharmacy Med Name: CYCLOBENZAPRINE 10 MG TABLET] 60 tablet 3     Sig: TAKE 1 TABLET BY MOUTH 2 TIMES DAILY AS NEEDED FOR MUSCLE SPASMS.        Last Appointment Date: 3/11/2022  Next Appointment Date: Visit date not found    Allergies   Allergen Reactions    Propofol Nausea And Vomiting

## 2022-06-04 ENCOUNTER — OFFICE VISIT (OUTPATIENT)
Age: 37
End: 2022-06-04
Payer: COMMERCIAL

## 2022-06-04 VITALS
RESPIRATION RATE: 18 BRPM | WEIGHT: 259.4 LBS | SYSTOLIC BLOOD PRESSURE: 126 MMHG | TEMPERATURE: 97.7 F | HEIGHT: 69 IN | OXYGEN SATURATION: 98 % | HEART RATE: 116 BPM | DIASTOLIC BLOOD PRESSURE: 88 MMHG | BODY MASS INDEX: 38.42 KG/M2

## 2022-06-04 DIAGNOSIS — Z11.52 ENCOUNTER FOR SCREENING FOR COVID-19: ICD-10-CM

## 2022-06-04 DIAGNOSIS — J06.9 VIRAL URI WITH COUGH: Primary | ICD-10-CM

## 2022-06-04 LAB — SARS-COV-2, PCR: NOT DETECTED

## 2022-06-04 PROCEDURE — 99213 OFFICE O/P EST LOW 20 MIN: CPT

## 2022-06-04 PROCEDURE — 96372 THER/PROPH/DIAG INJ SC/IM: CPT

## 2022-06-04 RX ORDER — TRIAMCINOLONE ACETONIDE 40 MG/ML
80 INJECTION, SUSPENSION INTRA-ARTICULAR; INTRAMUSCULAR ONCE
Status: COMPLETED | OUTPATIENT
Start: 2022-06-04 | End: 2022-06-04

## 2022-06-04 RX ORDER — BROMPHENIRAMINE MALEATE, PSEUDOEPHEDRINE HYDROCHLORIDE, AND DEXTROMETHORPHAN HYDROBROMIDE 2; 30; 10 MG/5ML; MG/5ML; MG/5ML
5-10 SYRUP ORAL 3 TIMES DAILY PRN
Qty: 118 ML | Refills: 0 | Status: SHIPPED | OUTPATIENT
Start: 2022-06-04 | End: 2022-06-28

## 2022-06-04 RX ADMIN — TRIAMCINOLONE ACETONIDE 80 MG: 40 INJECTION, SUSPENSION INTRA-ARTICULAR; INTRAMUSCULAR at 13:56

## 2022-06-04 ASSESSMENT — ENCOUNTER SYMPTOMS
CHEST TIGHTNESS: 1
COUGH: 1
SINUS PRESSURE: 1
SORE THROAT: 1

## 2022-06-04 NOTE — PATIENT INSTRUCTIONS
1. Covid test results will be called to you when they are available. 2. Avoid steroids for 3 months r/t long term side effects since you got injection in office  3. Hydrate with water or gatorade  4. bromfed cough syrup for congestion or cough  5. Tylenol or motrin for pain or fever  6. Warm salt water gargles or warm liquids for comfort. Warm tea with a tablespoon of honey is excellent for sore throat. 7. If symptoms worsen, please seek reevaluation    Patient Education        Viral Respiratory Infection: Care Instructions  Your Care Instructions     Viruses are very small organisms. They grow in number after they enter your body. There are many types that cause different illnesses, such as colds andthe mumps. The symptoms of a viral respiratory infection often start quickly. They include a fever, sore throat, and runny nose. You may also just not feel well. Or youmay not want to eat much. Most viral respiratory infections are not serious. They usually get better withtime and self-care. Antibiotics are not used to treat a viral infection. That's because antibiotics will not help cure a viral illness. In some cases, antiviral medicine can helpyour body fight a serious viral infection. Follow-up care is a key part of your treatment and safety. Be sure to make and go to all appointments, and call your doctor if you are having problems. It's also a good idea to know your test results and keep alist of the medicines you take. How can you care for yourself at home?  Rest as much as possible until you feel better.  Be safe with medicines. Take your medicine exactly as prescribed. Call your doctor if you think you are having a problem with your medicine. You will get more details on the specific medicine your doctor prescribes.  Take an over-the-counter pain medicine, such as acetaminophen (Tylenol), ibuprofen (Advil, Motrin), or naproxen (Aleve), as needed for pain and fever.  Read and follow all instructions on the label. Do not give aspirin to anyone younger than 20. It has been linked to Reye syndrome, a serious illness.  Drink plenty of fluids. Hot fluids, such as tea or soup, may help relieve congestion in your nose and throat. If you have kidney, heart, or liver disease and have to limit fluids, talk with your doctor before you increase the amount of fluids you drink.  Try to clear mucus from your lungs by breathing deeply and coughing.  Gargle with warm salt water once an hour. This can help reduce swelling and throat pain. Use 1 teaspoon of salt mixed in 1 cup of warm water.  Do not smoke or allow others to smoke around you. If you need help quitting, talk to your doctor about stop-smoking programs and medicines. These can increase your chances of quitting for good. To avoid spreading the virus   Cough or sneeze into a tissue. Then throw the tissue away.  If you don't have a tissue, use your hand to cover your cough or sneeze. Then clean your hand. You can also cough into your sleeve.  Wash your hands often. Use soap and warm water. Wash for 15 to 20 seconds each time.  If you don't have soap and water near you, you can clean your hands with alcohol wipes or gel. When should you call for help? Call your doctor now or seek immediate medical care if:     You have a new or higher fever.      Your fever lasts more than 48 hours.      You have trouble breathing.      You have a fever with a stiff neck or a severe headache.      You are sensitive to light.      You feel very sleepy or confused. Watch closely for changes in your health, and be sure to contact your doctor if:     You do not get better as expected. Where can you learn more? Go to https://[email or web address].[employer]. org and sign in to your [employer] account. Enter F286 in the [employer] box to learn more about \"Viral Respiratory Infection: Care Instructions. \"     If you do not have an account, please click on the \"Sign Up Now\" link. Current as of: July 6, 2021               Content Version: 13.2  © 2006-2022 [employer]. Care instructions adapted under license by [facility] ([facility]). If you have questions about a medical condition or this instruction, always ask your healthcare professional. [place] [address] any warranty or liability for your use of this information.

## 2022-06-04 NOTE — PROGRESS NOTES
[address]  [address] [address] [place] [address]  Dept: [phone]  Dept Fax: [phone]  Loc: [phone]    Sabrina Tovar is a 29 y.o. female who presents today for her medical conditions/complaints as noted below. Sabrina Tovar is c/o of Cough (Patient reports began Wed with dry cough / Now cough is productive ), Headache, Sinus Problem (sinus pressure ), Pharyngitis, and Ear Fullness        HPI:     YONY Berumen presents with complaints of headache, left ear pain, facial pressure, fatigue, congestion, sore throat, cough and chest pain with cough since Weds. She denies fever and chills. She denies recent antibiotics and steroids. She has taken mucinex and nyquil without relief. She has had covid19 infection in Dec 2021. Past Medical History:   Diagnosis Date    Hyperlipidemia     Hypertension     IBS (irritable bowel syndrome)     Migraines      Past Surgical History:   Procedure Laterality Date    KNEE ARTHROSCOPY Left     UPPER GASTROINTESTINAL ENDOSCOPY      WISDOM TOOTH EXTRACTION         Family History   Problem Relation Age of Onset   Connye Sole Migraines Mother     Hypertension Father    Connye Sole Elevated Lipids Father     Lung Cancer Maternal Grandfather     Migraines Paternal Grandmother     Cancer Paternal Grandfather         [place]       Social History     Tobacco Use    Smoking status: Never Smoker    Smokeless tobacco: Never Used   Substance Use Topics    Alcohol use: Yes     Comment: occasion      Current Outpatient Medications   Medication Sig Dispense Refill    cyclobenzaprine (FLEXERIL) 10 MG tablet TAKE 1 TABLET BY MOUTH 2 TIMES DAILY AS NEEDED FOR MUSCLE SPASMS.  60 tablet 1    losartan (COZAAR) 25 MG tablet TAKE 1 TABLET BY MOUTH EVERY DAY 30 tablet 2    diclofenac (VOLTAREN) 75 MG EC tablet Take 1 tablet by mouth 2 times daily 60 tablet 1    buPROPion (WELLBUTRIN XL) 150 MG extended release tablet TAKE 1 TABLET BY MOUTH EVERY DAY IN THE MORNING 30 tablet 5    pantoprazole (PROTONIX) 20 MG tablet Take 1 tablet by mouth every morning (before breakfast) 30 tablet 2    amitriptyline (ELAVIL) 10 MG tablet Take 1 tablet by mouth nightly 30 tablet 2    Levonorgest-Eth Estrad-Fe Bisg 0.1-20 MG-MCG(21) TABS Take 1 tablet daily 28 tablet 5    SUMAtriptan (IMITREX) 100 MG tablet Take 1 tablet by mouth once as needed for Migraine . May repeat in 2 hours if needed. Do not exceed 2 tablets in 24 hours. 9 tablet 2    diclofenac (VOLTAREN) 75 MG EC tablet Take 1 tablet by mouth 2 times daily as needed for Pain 60 tablet 3    albuterol sulfate HFA (VENTOLIN HFA) 108 (90 Base) MCG/ACT inhaler Inhale 2 puffs into the lungs 4 times daily as needed for Wheezing 18 g 0    dicyclomine (BENTYL) 10 MG capsule TAKE 1 CAPSULE BY MOUTH 3 TIMES DAILY AS NEEDED (ABDOMINAL CRAMPING) 270 capsule 1    tiZANidine (ZANAFLEX) 4 MG tablet TAKE 1 TABLET BY MOUTH 2 TIMES DAILY AS NEEDED (BACK PAIN) (Patient not taking: Reported on 6/4/2022) 30 tablet 1    promethazine-dextromethorphan (PROMETHAZINE-DM) 6.25-15 MG/5ML syrup Take 5 mLs by mouth nightly as needed for Cough (Patient not taking: Reported on 3/11/2022) 120 mL 0    fluticasone (FLONASE) 50 MCG/ACT nasal spray 2 sprays by Each Nostril route daily (Patient not taking: Reported on 6/4/2022) 16 g 5     No current facility-administered medications for this visit.      Allergies   Allergen Reactions    Propofol Nausea And Vomiting       Health Maintenance   Topic Date Due    Varicella vaccine (1 of 2 - 2-dose childhood series) Never done    COVID-19 Vaccine (1) Never done    HIV screen  Never done    Hepatitis C screen  Never done    DTaP/Tdap/Td vaccine (1 - Tdap) Never done    Cervical cancer screen  Never done    Flu vaccine (Season Ended) 09/01/2022    Depression Screen  03/11/2023    Hepatitis A vaccine  Aged Out    Hepatitis B vaccine  Aged Out    Hib vaccine  Aged C/ Carlos A Taye 19 Meningococcal (ACWY) vaccine  Aged Out    Pneumococcal 0-64 years Vaccine  Aged Out       Subjective:     Review of Systems   Constitutional: Positive for fatigue. Negative for chills and fever. HENT: Positive for congestion, ear pain, sinus pressure and sore throat. Respiratory: Positive for cough and chest tightness. Neurological: Positive for headaches.       :Objective      Physical Exam  Constitutional:       General: She is not in acute distress. Appearance: Normal appearance. She is ill-appearing. She is not toxic-appearing. HENT:      Head: Normocephalic and atraumatic. Right Ear: Tympanic membrane, ear canal and external ear normal.      Left Ear: Ear canal and external ear normal. A middle ear effusion is present. Nose: Congestion present. Right Sinus: Maxillary sinus tenderness present. Left Sinus: Maxillary sinus tenderness present. Mouth/Throat:      Mouth: Mucous membranes are moist.      Pharynx: Oropharynx is clear. Posterior oropharyngeal erythema present. No oropharyngeal exudate. Eyes:      General:         Right eye: No discharge. Left eye: No discharge. Conjunctiva/sclera: Conjunctivae normal.   Cardiovascular:      Rate and Rhythm: Normal rate and regular rhythm. Pulmonary:      Effort: Pulmonary effort is normal. No respiratory distress. Breath sounds: Normal breath sounds. Abdominal:      General: Abdomen is flat. Palpations: Abdomen is soft. Musculoskeletal:         General: Normal range of motion. Cervical back: Normal range of motion. Lymphadenopathy:      Cervical: No cervical adenopathy. Skin:     General: Skin is warm and dry. Capillary Refill: Capillary refill takes less than 2 seconds. Findings: No rash. Neurological:      General: No focal deficit present. Mental Status: She is alert.    Psychiatric:         Mood and Affect: Mood normal.       /88   Pulse (!) 116   Temp 97.7 °F (36.5 °C)   Resp 18   Ht 5' 9\" (1.753 m)   Wt 259 lb 6.4 oz (117.7 kg)   SpO2 98%   BMI 38.31 kg/m²     :Assessment       Diagnosis Orders   1. Encounter for screening for COVID-19  COVID-19       :Plan    will give kenalog injection for symptomatic support. bromfed for symptoms. R/o covid19 as source. Return precautions and home care education completed. Patient verbalized understanding. Orders Placed This Encounter   Procedures    COVID-19     Scheduling Instructions:      1) Due to current limited availability of the COVID-19 test, tests will be prioritized based on responses to questions above. Testing may be delayed due to volume. 2) Print and instruct patient to adhere to CDC home isolation program. (Link Above)              3) Set up or refer patient for a monitoring program.              4) Have patient sign up for and leverage MyChart (if not previously done). Order Specific Question:   Is this test for diagnosis or screening? Answer:   Screening     Order Specific Question:   Symptomatic for COVID-19 as defined by CDC? Answer:   No     Order Specific Question:   Date of Symptom Onset     Answer:   N/A     Order Specific Question:   Hospitalized for COVID-19? Answer:   No     Order Specific Question:   Admitted to ICU for COVID-19? Answer:   No     Order Specific Question:   Employed in healthcare setting? Answer:   No     Order Specific Question:   Resident in a congregate (group) care setting? Answer:   No     Order Specific Question:   Pregnant? Answer:   Unknown     Order Specific Question:   Previously tested for COVID-19? Answer: Yes    COVID-19    COVID-19       No follow-ups on file. No orders of the defined types were placed in this encounter. Patient given educational materials- see patient instructions. Discussed use, benefit, and side effects of prescribed medications. All patient questions answered. Pt voiced understanding. There are no Patient Instructions on file for this visit.       Electronically signed by ZEESHAN Gomez CNP on 6/4/2022 at 1:51 PM

## 2022-06-06 RX ORDER — AMITRIPTYLINE HYDROCHLORIDE 10 MG/1
10 TABLET, FILM COATED ORAL NIGHTLY
Qty: 30 TABLET | Refills: 2 | Status: SHIPPED | OUTPATIENT
Start: 2022-06-06 | End: 2022-10-18 | Stop reason: SDUPTHER

## 2022-06-06 NOTE — TELEPHONE ENCOUNTER
[facility] called requesting a refill of the below medication which has been pended for you:     Requested Prescriptions     Pending Prescriptions Disp Refills    amitriptyline (ELAVIL) 10 mg tablet [Pharmacy Med Name: AMITRIPTYLINE HCL 10 MG TAB] 30 tablet 2     Sig: TAKE 1 TABLET BY MOUTH NIGHTLY       Last Appointment Date: 3/11/2022  Next Appointment Date: Visit date not found    Allergies   Allergen Reactions    Propofol Nausea And Vomiting

## 2022-06-23 ENCOUNTER — TELEPHONE (OUTPATIENT)
Dept: FAMILY MEDICINE CLINIC | Age: 37
End: 2022-06-23

## 2022-06-23 ENCOUNTER — NURSE TRIAGE (OUTPATIENT)
Dept: OTHER | Facility: CLINIC | Age: 37
End: 2022-06-23

## 2022-06-23 NOTE — TELEPHONE ENCOUNTER
Received call from Daniel at [facility] [employer]; Patient with Red Flag Complaint requesting to establish care with [facility]. Subjective: Caller states \"Having high blood pressure, reading was 136/101, P 95. Was put on Losartan. Has been fine but having some stressors, and has been high consistently for a few weeks. \"     Current Symptoms: /101, P 95 (yesterday); /95 P 90 (yesterday); headaches    Onset: a few weeks ago; gradual    Associated Symptoms: reduced activity    Pain Severity: 0/10; N/A; none    Temperature: Denies      What has been tried: limit caffeine, took bp medications last night (Losartan 25mg)    LMP: Current Pregnant: No    Recommended disposition: See in Office Within 2 Weeks; Care advice provided, patient verbalizes understanding; denies any other questions or concerns; instructed to call back for any new or worsening symptoms. Patient/Caller agrees with recommended disposition; writer provided warm transfer to Maxi Awad at [facility] [employer] [facility] for appointment scheduling    Attention Provider: Thank you for allowing me to participate in the care of your patient. The patient was connected to triage in response to information provided to the [facility]. Please do not respond through this encounter as the response is not directed to a shared pool.       Reason for Disposition   Systolic BP >= 194 OR Diastolic >= 80, and is taking BP medications    Protocols used: BLOOD PRESSURE - HIGH-ADULT-OH

## 2022-06-23 NOTE — TELEPHONE ENCOUNTER
----- Message from Matias Machado sent at 2022  4:18 PM CDT -----  Subject: Appointment Request    Reason for Call: Routine Existing Condition Follow Up    QUESTIONS  Type of Appointment? New Patient/New to Provider  Reason for appointment request? Available appointments did not meet   patient need  Additional Information for Provider? Patient called in to schedule an   appointment with Darek Aaron to establish care and a blood pressure   check, she is a current patient of Yoseph Galdamez, would like to be   seen as soon as possible if any availability   ---------------------------------------------------------------------------  --------------  CALL BACK INFO  What is the best way for the office to contact you? OK to leave message on   voicemail  Preferred Call Back Phone Number? [phone]  ---------------------------------------------------------------------------  --------------  SCRIPT ANSWERS  Relationship to Patient? Self  Specialty Confirmation? Primary Care  Is this the first appointment to establish care for a ? No  Have you been diagnosed with COVID-19 in the past 10 days? No  (Service Expert  click yes below to proceed with [employer] As Usual   Scheduling)? Yes  Is this follow up request related to routine Diabetes Management?  No

## 2022-06-28 ENCOUNTER — OFFICE VISIT (OUTPATIENT)
Dept: OBGYN CLINIC | Age: 37
End: 2022-06-28
Payer: COMMERCIAL

## 2022-06-28 VITALS
HEART RATE: 98 BPM | BODY MASS INDEX: 38.36 KG/M2 | HEIGHT: 69 IN | DIASTOLIC BLOOD PRESSURE: 98 MMHG | WEIGHT: 259 LBS | SYSTOLIC BLOOD PRESSURE: 143 MMHG

## 2022-06-28 DIAGNOSIS — N92.1 MENORRHAGIA WITH IRREGULAR CYCLE: Primary | ICD-10-CM

## 2022-06-28 DIAGNOSIS — Z30.41 ORAL CONTRACEPTIVE PILL SURVEILLANCE: ICD-10-CM

## 2022-06-28 DIAGNOSIS — I10 HYPERTENSION, UNSPECIFIED TYPE: ICD-10-CM

## 2022-06-28 PROCEDURE — 99214 OFFICE O/P EST MOD 30 MIN: CPT | Performed by: NURSE PRACTITIONER

## 2022-06-28 ASSESSMENT — ENCOUNTER SYMPTOMS
RESPIRATORY NEGATIVE: 1
EYES NEGATIVE: 1
GASTROINTESTINAL NEGATIVE: 1

## 2022-06-28 NOTE — PROGRESS NOTES
Yoana Kay ( 1985) is a 29 y.o. female,NEW , here for evaluation of the following chief complaint(s).   New Patient (use to see [facility] ) and Hypertension      Patient was encouraged and advised to be compliant with all  medications leads an active lifestyle and promote maintaining a healthy weight, encouraged not to use cigarettes, laboratory results discussed and reviewed with patient's during this visit   ASSESSMENT/PLAN:  Problem List        High    Snoring      Fluctuating blood pressure advised to have her sleep study due to heavy snoring daytime somnolence and daytime fatigability         Relevant Orders    Baseline Diagnostic Sleep Study    Mixed hyperlipidemia      Uncontrolled, Patient advised to have repeat lipid function to reassess her levels         Relevant Medications    metoprolol tartrate (LOPRESSOR) 25 MG tablet    Migraines      Unclear control, changes made today: Patient may take Imitrex and a beta-blocker was started to see if it will help control the onset of her blood pressure         Relevant Medications    diclofenac (VOLTAREN) 75 MG EC tablet    SUMAtriptan (IMITREX) 100 MG tablet    buPROPion (WELLBUTRIN XL) 150 MG extended release tablet    cyclobenzaprine (FLEXERIL) 10 MG tablet    amitriptyline (ELAVIL) 10 mg tablet    metoprolol tartrate (LOPRESSOR) 25 MG tablet    IBS (irritable bowel syndrome)      Patient has had upper endoscopy and colonoscopy and she had a negative work-up and she is currently is aware that she has IBS         Relevant Medications    dicyclomine (BENTYL) 10 MG capsule    pantoprazole (PROTONIX) 20 MG tablet    Hypertension      Patient's blood pressure is normal in the office however she documents elevated blood pressure at home and late at night she was advised to bring her blood pressure cuff and compare it with our reading and she was encouraged to get up a cough that is large since she has fairly large which could affect her blood pressure readings  After much discussion we felt that the losartan should be discontinued and I felt because she has migraine headaches that giving her a beta-blocker may help reduce the incidence of a headache and control her blood pressure better we also decided that she would be tested for sleep apnea since she has episodes of heavy snoring fatigue in the morning with daytime somnolence         Relevant Medications    metoprolol tartrate (LOPRESSOR) 25 MG tablet    Hx of hyperpigmentation of skin      Monitored by specialist- no acute findings meriting change in the plan            Medium    GERD without esophagitis      Well-controlled, continue current medications         Relevant Medications    dicyclomine (BENTYL) 10 MG capsule    pantoprazole (PROTONIX) 20 MG tablet    DUB (dysfunctional uterine bleeding)      Monitored by specialist- no acute findings meriting change in the plan   Patient is seen by a obstetric nurse practitioner will be responsible for managing her bleeding or intermenstrual bleeding patient advised to call her GYN provider         Class 2 obesity due to excess calories without serious comorbidity with body mass index (BMI) of 38.0 to 38.9 in adult      Patient does acknowledge that she has obesity she has difficulty doing exercise because of her back as she has a very heavy job and she is interested in losing weight at this time we will tackle her blood pressure control of her back pain diminish her migraines reassess her labs when I see her we will talk about trying to start her on Ozempic         Chronic midline low back pain with left-sided sciatica      Monitored by specialist- no acute findings meriting change in the plan   Patient is taking cyclobenzaprine however she has been taking Voltaren tablets twice a day she was informed that at times it can elevate her blood pressure therefore she will try to take her nonsteroidal once a day along with some Tylenol and monitor her blood pressure         Relevant Medications    diclofenac (VOLTAREN) 75 MG EC tablet    buPROPion (WELLBUTRIN XL) 150 MG extended release tablet    cyclobenzaprine (FLEXERIL) 10 MG tablet    amitriptyline (ELAVIL) 10 mg tablet            No flowsheet data found. PHQ Scores 6/29/2022 3/11/2022 9/10/2021   PHQ2 Score 0 0 0   PHQ9 Score 0 0 0       Results for orders placed or performed in visit on 06/04/22   COVID-19   Result Value Ref Range    SARS-CoV-2, PCR Not Detected Not Detected       Return in about 6 months (around 12/29/2022), or ma bp check in 1 month. HPI    Review of Systems   Constitutional: Negative for activity change, chills, fatigue and fever. HENT: Negative for congestion, ear pain, hearing loss, mouth sores, nosebleeds, postnasal drip, rhinorrhea, sinus pain, sore throat and trouble swallowing. Eyes: Negative for visual disturbance. Respiratory: Negative for apnea, cough, chest tightness, shortness of breath and wheezing. Cardiovascular: Negative for chest pain, palpitations and leg swelling. Gastrointestinal: Positive for constipation and diarrhea. Negative for abdominal distention, abdominal pain, blood in stool, nausea and vomiting. Endocrine: Negative for cold intolerance, heat intolerance and polyuria. Genitourinary: Negative for difficulty urinating, dysuria, flank pain, frequency and urgency. Musculoskeletal: Positive for back pain. Negative for arthralgias and myalgias. Skin: Positive for color change. Negative for rash and wound. Allergic/Immunologic: Negative for environmental allergies and food allergies. Neurological: Positive for headaches. Negative for dizziness, tremors, seizures, syncope, speech difficulty, weakness, light-headedness and numbness. Hematological: Negative for adenopathy. Does not bruise/bleed easily. Psychiatric/Behavioral: Negative for confusion, decreased concentration, dysphoric mood, sleep disturbance and suicidal ideas.  The patient is nervous/anxious. The patient is not hyperactive. Physical Exam  Vitals and nursing note reviewed. Constitutional:       General: She is not in acute distress. Appearance: Normal appearance. HENT:      Head: Normocephalic. Right Ear: External ear normal.      Left Ear: External ear normal.      Nose: Nose normal. No congestion or rhinorrhea. Mouth/Throat:      Mouth: Mucous membranes are moist.      Pharynx: No oropharyngeal exudate or posterior oropharyngeal erythema. Eyes:      General: No scleral icterus. Right eye: No discharge. Left eye: No discharge. Extraocular Movements: Extraocular movements intact. Conjunctiva/sclera: Conjunctivae normal.      Pupils: Pupils are equal, round, and reactive to light. Neck:      Thyroid: No thyromegaly. Vascular: No carotid bruit or JVD. Cardiovascular:      Rate and Rhythm: Normal rate and regular rhythm. Chest Wall: PMI is not displaced. Pulses: Normal pulses. Heart sounds: Normal heart sounds, S1 normal and S2 normal. No murmur heard. Pulmonary:      Effort: Pulmonary effort is normal. No respiratory distress. Breath sounds: Normal breath sounds and air entry. No decreased air movement or transmitted upper airway sounds. No decreased breath sounds, wheezing or rales. Abdominal:      General: Abdomen is flat. Bowel sounds are normal. There is no distension. Palpations: Abdomen is soft. There is no shifting dullness, hepatomegaly, splenomegaly or mass. Tenderness: There is no abdominal tenderness. There is no right CVA tenderness, left CVA tenderness, guarding or rebound. Hernia: No hernia is present. Musculoskeletal:         General: No deformity.       Right shoulder: Normal.      Left shoulder: Normal.      Right elbow: Normal.      Left elbow: Normal.      Right wrist: Normal.      Left wrist: Normal.      Cervical back: Normal, normal range of motion and neck supple. No rigidity. Thoracic back: Normal.      Lumbar back: Tenderness present. Decreased range of motion. Right knee: Normal.      Left knee: Normal.      Right lower leg: No edema. Left lower leg: No edema. Right ankle: Normal.      Left ankle: Normal.   Lymphadenopathy:      Cervical: No cervical adenopathy. Skin:     General: Skin is warm and moist.      Findings: No lesion or rash. Comments: hyperpigmentation on the forehead, around the lips   Neurological:      Mental Status: She is alert. Mental status is at baseline. Cranial Nerves: Cranial nerves are intact. No cranial nerve deficit. Motor: Motor function is intact. Coordination: Coordination is intact. Gait: Gait is intact. Deep Tendon Reflexes: Reflexes normal.   Psychiatric:         Attention and Perception: Attention normal.         Speech: Speech normal.         Behavior: Behavior normal.         Thought Content:  Thought content normal.         Judgment: Judgment normal.           (Time Documentation Optional [id number])    An electronic signaturewaas used to authenticate this note  -Tom Mireles MD on 6/29/2022 at 6:02 PM

## 2022-06-28 NOTE — PROGRESS NOTES
Jenny Boyd is a 29 y.o. female who presents today for her medical conditions/ complaints as noted below. Jenny Boyd is c/o of Other (two week cycles)        HPI  Patient presents today with complaints of prolonged periods. She has had ultrasound, changed ocp and its continued for about 2 years. Nothing is helping     going to pcp for high blood pressure tomorrow ( new problem)   Has had labs and hormones checked in past and ok. Hasn't had children yet but wants to at some point. In relationship for 13 years now   was at derm recently for dark line around lips and new dx of rosacea. They told her the dark line was hormone related and wanted to do a laser. Patient's last menstrual period was 06/13/2022 (approximate). No obstetric history on file. Past Medical History:   Diagnosis Date    Hyperlipidemia     Hypertension     IBS (irritable bowel syndrome)     Migraines      Past Surgical History:   Procedure Laterality Date    KNEE ARTHROSCOPY Left     UPPER GASTROINTESTINAL ENDOSCOPY      WISDOM TOOTH EXTRACTION       Family History   Problem Relation Age of Onset   Albina Me Migraines Mother     Hypertension Father    Albina Me Elevated Lipids Father     Lung Cancer Maternal Grandfather     Migraines Paternal Grandmother     Cancer Paternal Grandfather         [place]     Social History     Tobacco Use    Smoking status: Never Smoker    Smokeless tobacco: Never Used   Substance Use Topics    Alcohol use: Yes     Comment: occasion       Current Outpatient Medications   Medication Sig Dispense Refill    amitriptyline (ELAVIL) 10 mg tablet TAKE 1 TABLET BY MOUTH NIGHTLY 30 tablet 2    cyclobenzaprine (FLEXERIL) 10 MG tablet TAKE 1 TABLET BY MOUTH 2 TIMES DAILY AS NEEDED FOR MUSCLE SPASMS.  60 tablet 1    losartan (COZAAR) 25 MG tablet TAKE 1 TABLET BY MOUTH EVERY DAY 30 tablet 2    buPROPion (WELLBUTRIN XL) 150 MG extended release tablet TAKE 1 TABLET BY MOUTH EVERY DAY IN THE MORNING 30 tablet 5    pantoprazole (PROTONIX) 20 MG tablet Take 1 tablet by mouth every morning (before breakfast) 30 tablet 2    Levonorgest-Eth Estrad-Fe Bisg 0.1-20 MG-MCG(21) TABS Take 1 tablet daily 28 tablet 5    SUMAtriptan (IMITREX) 100 MG tablet Take 1 tablet by mouth once as needed for Migraine . May repeat in 2 hours if needed. Do not exceed 2 tablets in 24 hours. 9 tablet 2    tiZANidine (ZANAFLEX) 4 MG tablet TAKE 1 TABLET BY MOUTH 2 TIMES DAILY AS NEEDED (BACK PAIN) 30 tablet 1    fluticasone (FLONASE) 50 MCG/ACT nasal spray 2 sprays by Each Nostril route daily 16 g 5    diclofenac (VOLTAREN) 75 MG EC tablet Take 1 tablet by mouth 2 times daily as needed for Pain 60 tablet 3    albuterol sulfate HFA (VENTOLIN HFA) 108 (90 Base) MCG/ACT inhaler Inhale 2 puffs into the lungs 4 times daily as needed for Wheezing 18 g 0    dicyclomine (BENTYL) 10 MG capsule TAKE 1 CAPSULE BY MOUTH 3 TIMES DAILY AS NEEDED (ABDOMINAL CRAMPING) 270 capsule 1     No current facility-administered medications for this visit. Allergies   Allergen Reactions    Propofol Nausea And Vomiting     Vitals:    06/28/22 1541   BP: (!) 143/98   Pulse: 98     Body mass index is 38.25 kg/m². Review of Systems   Constitutional: Negative. HENT: Negative. Eyes: Negative. Respiratory: Negative. Cardiovascular: Negative. Gastrointestinal: Negative. Genitourinary: Positive for menstrual problem. Negative for difficulty urinating, dyspareunia, dysuria, enuresis, frequency, hematuria, pelvic pain, urgency and vaginal discharge. Musculoskeletal: Negative. Skin: Negative. Neurological: Negative. Psychiatric/Behavioral: Negative. Physical Exam  Vitals and nursing note reviewed. Constitutional:       General: She is not in acute distress. Appearance: She is well-developed. She is not diaphoretic. HENT:      Head: Normocephalic and atraumatic.    Eyes:      Conjunctiva/sclera: Conjunctivae normal.      Pupils: Pupils are equal, round, and reactive to light. Pulmonary:      Effort: Pulmonary effort is normal.   Abdominal:      Tenderness: There is no guarding. Musculoskeletal:         General: Normal range of motion. Cervical back: Normal range of motion. Comments: Normal ROM in all 4 extremities; normal gait   Skin:     General: Skin is warm and dry. Neurological:      Mental Status: She is alert and oriented to person, place, and time. Motor: No abnormal muscle tone. Coordination: Coordination normal.   Psychiatric:         Behavior: Behavior normal.          Diagnosis Orders   1. Menorrhagia with irregular cycle     2. Hypertension, unspecified type     3. Oral contraceptive pill surveillance         MEDICATIONS:  No orders of the defined types were placed in this encounter. ORDERS:  No orders of the defined types were placed in this encounter. PLAN:  After discussion, pt to stop ocp completely and give body a chance to see how periods will do without any birth control. To discuss w Dr Malaika Myers tomorrow her bp's and bleeding to see if anything comes to mind that i'm not thinking of r/t bp's, ?pheochromocytoma? ? However bp's don't seem elevated enough for that. Also to let her know she needs to be on bp med that is ok for pregnancy if conceives. If periods dont normalize, consider cyclic progesterone or progesterone only pill  We discussed her age and fertility and my thoughts on trying very soon to conceive after bp issue and bleeding sorted out  Over 50% of the total visit time of 35 minutes was spent on counseling and/or coordination of care. All questions were answered and the patient voiced understanding. There are no Patient Instructions on file for this visit.

## 2022-06-29 ENCOUNTER — OFFICE VISIT (OUTPATIENT)
Dept: PRIMARY CARE CLINIC | Age: 37
End: 2022-06-29
Payer: COMMERCIAL

## 2022-06-29 VITALS
OXYGEN SATURATION: 97 % | HEART RATE: 88 BPM | HEIGHT: 69 IN | WEIGHT: 260.6 LBS | DIASTOLIC BLOOD PRESSURE: 82 MMHG | BODY MASS INDEX: 38.6 KG/M2 | SYSTOLIC BLOOD PRESSURE: 120 MMHG

## 2022-06-29 DIAGNOSIS — E78.2 MIXED HYPERLIPIDEMIA: ICD-10-CM

## 2022-06-29 DIAGNOSIS — N93.8 DUB (DYSFUNCTIONAL UTERINE BLEEDING): ICD-10-CM

## 2022-06-29 DIAGNOSIS — Z13.29 SCREENING FOR THYROID DISORDER: ICD-10-CM

## 2022-06-29 DIAGNOSIS — I10 HYPERTENSION, UNSPECIFIED TYPE: ICD-10-CM

## 2022-06-29 DIAGNOSIS — Z88.9 MULTIPLE ALLERGIES: ICD-10-CM

## 2022-06-29 DIAGNOSIS — K21.9 GERD WITHOUT ESOPHAGITIS: ICD-10-CM

## 2022-06-29 DIAGNOSIS — Z13.0 SCREENING FOR DEFICIENCY ANEMIA: ICD-10-CM

## 2022-06-29 DIAGNOSIS — Z12.4 CERVICAL CANCER SCREENING: ICD-10-CM

## 2022-06-29 DIAGNOSIS — G43.709 CHRONIC MIGRAINE WITHOUT AURA WITHOUT STATUS MIGRAINOSUS, NOT INTRACTABLE: ICD-10-CM

## 2022-06-29 DIAGNOSIS — G89.29 CHRONIC MIDLINE LOW BACK PAIN WITH LEFT-SIDED SCIATICA: ICD-10-CM

## 2022-06-29 DIAGNOSIS — R06.83 SNORING: ICD-10-CM

## 2022-06-29 DIAGNOSIS — Z13.220 SCREENING FOR LIPID DISORDERS: ICD-10-CM

## 2022-06-29 DIAGNOSIS — Z11.4 SCREENING FOR HIV WITHOUT PRESENCE OF RISK FACTORS: ICD-10-CM

## 2022-06-29 DIAGNOSIS — E66.09 CLASS 2 OBESITY DUE TO EXCESS CALORIES WITHOUT SERIOUS COMORBIDITY WITH BODY MASS INDEX (BMI) OF 38.0 TO 38.9 IN ADULT: ICD-10-CM

## 2022-06-29 DIAGNOSIS — Z87.2: ICD-10-CM

## 2022-06-29 DIAGNOSIS — M54.42 CHRONIC MIDLINE LOW BACK PAIN WITH LEFT-SIDED SCIATICA: ICD-10-CM

## 2022-06-29 DIAGNOSIS — Z13.1 DIABETES MELLITUS SCREENING: ICD-10-CM

## 2022-06-29 DIAGNOSIS — Z11.59 ENCOUNTER FOR HEPATITIS C SCREENING TEST FOR LOW RISK PATIENT: Primary | ICD-10-CM

## 2022-06-29 DIAGNOSIS — K58.0 IRRITABLE BOWEL SYNDROME WITH DIARRHEA: ICD-10-CM

## 2022-06-29 PROBLEM — E66.812 CLASS 2 OBESITY DUE TO EXCESS CALORIES WITHOUT SERIOUS COMORBIDITY WITH BODY MASS INDEX (BMI) OF 38.0 TO 38.9 IN ADULT: Status: ACTIVE | Noted: 2022-06-29

## 2022-06-29 PROCEDURE — 99203 OFFICE O/P NEW LOW 30 MIN: CPT | Performed by: INTERNAL MEDICINE

## 2022-06-29 SDOH — HEALTH STABILITY: PHYSICAL HEALTH: ON AVERAGE, HOW MANY DAYS PER WEEK DO YOU ENGAGE IN MODERATE TO STRENUOUS EXERCISE (LIKE A BRISK WALK)?: 3 DAYS

## 2022-06-29 SDOH — HEALTH STABILITY: PHYSICAL HEALTH: ON AVERAGE, HOW MANY MINUTES DO YOU ENGAGE IN EXERCISE AT THIS LEVEL?: 10 MIN

## 2022-06-29 ASSESSMENT — ENCOUNTER SYMPTOMS
DIARRHEA: 1
APNEA: 0
TROUBLE SWALLOWING: 0
SHORTNESS OF BREATH: 0
CHEST TIGHTNESS: 0
SINUS PAIN: 0
VOMITING: 0
BACK PAIN: 1
RHINORRHEA: 0
BLOOD IN STOOL: 0
NAUSEA: 0
COUGH: 0
ABDOMINAL DISTENTION: 0
ABDOMINAL PAIN: 0
SORE THROAT: 0
COLOR CHANGE: 1
WHEEZING: 0
CONSTIPATION: 1

## 2022-06-29 ASSESSMENT — PATIENT HEALTH QUESTIONNAIRE - PHQ9
2. FEELING DOWN, DEPRESSED OR HOPELESS: 0
SUM OF ALL RESPONSES TO PHQ QUESTIONS 1-9: 0
SUM OF ALL RESPONSES TO PHQ QUESTIONS 1-9: 0
1. LITTLE INTEREST OR PLEASURE IN DOING THINGS: 0
SUM OF ALL RESPONSES TO PHQ QUESTIONS 1-9: 0
SUM OF ALL RESPONSES TO PHQ9 QUESTIONS 1 & 2: 0
SUM OF ALL RESPONSES TO PHQ QUESTIONS 1-9: 0

## 2022-06-29 NOTE — ASSESSMENT & PLAN NOTE
Monitored by specialist- no acute findings meriting change in the plan   Patient is taking cyclobenzaprine however she has been taking Voltaren tablets twice a day she was informed that at times it can elevate her blood pressure therefore she will try to take her nonsteroidal once a day along with some Tylenol and monitor her blood pressure

## 2022-06-29 NOTE — ASSESSMENT & PLAN NOTE
Patient's blood pressure is normal in the office however she documents elevated blood pressure at home and late at night she was advised to bring her blood pressure cuff and compare it with our reading and she was encouraged to get up a cough that is large since she has fairly large which could affect her blood pressure readings  After much discussion we felt that the losartan should be discontinued and I felt because she has migraine headaches that giving her a beta-blocker may help reduce the incidence of a headache and control her blood pressure better we also decided that she would be tested for sleep apnea since she has episodes of heavy snoring fatigue in the morning with daytime somnolence

## 2022-06-29 NOTE — ASSESSMENT & PLAN NOTE
Unclear control, changes made today: Patient may take Imitrex and a beta-blocker was started to see if it will help control the onset of her blood pressure

## 2022-06-29 NOTE — ASSESSMENT & PLAN NOTE
Fluctuating blood pressure advised to have her sleep study due to heavy snoring daytime somnolence and daytime fatigability

## 2022-06-29 NOTE — ASSESSMENT & PLAN NOTE
Monitored by specialist- no acute findings meriting change in the plan   Patient is seen by a obstetric nurse practitioner will be responsible for managing her bleeding or intermenstrual bleeding patient advised to call her GYN provider

## 2022-06-29 NOTE — ASSESSMENT & PLAN NOTE
Patient has had upper endoscopy and colonoscopy and she had a negative work-up and she is currently is aware that she has IBS see above

## 2022-06-29 NOTE — ASSESSMENT & PLAN NOTE
Patient does acknowledge that she has obesity she has difficulty doing exercise because of her back as she has a very heavy job and she is interested in losing weight at this time we will tackle her blood pressure control of her back pain diminish her migraines reassess her labs when I see her we will talk about trying to start her on Ozempic

## 2022-07-22 RX ORDER — LEVONORGESTREL AND ETHINYL ESTRADIOL 0.1-0.02MG
KIT ORAL
Qty: 28 TABLET | Refills: 9 | Status: SHIPPED | OUTPATIENT
Start: 2022-07-22

## 2022-07-25 RX ORDER — CYCLOBENZAPRINE HCL 10 MG
10 TABLET ORAL 2 TIMES DAILY PRN
Qty: 60 TABLET | Refills: 1 | OUTPATIENT
Start: 2022-07-25

## 2022-07-25 NOTE — TELEPHONE ENCOUNTER
[facility] called requesting a refill of the below medication which has been pended for you:     Requested Prescriptions     Pending Prescriptions Disp Refills    cyclobenzaprine (FLEXERIL) 10 MG tablet [Pharmacy Med Name: CYCLOBENZAPRINE 10 MG TABLET] 60 tablet 1     Sig: TAKE 1 TABLET BY MOUTH 2 TIMES DAILY AS NEEDED FOR MUSCLE SPASMS.        Last Appointment Date: 3/11/2022  Next Appointment Date: Visit date not found    Allergies   Allergen Reactions    Propofol Nausea And Vomiting

## 2022-07-29 DIAGNOSIS — Z13.0 SCREENING FOR DEFICIENCY ANEMIA: ICD-10-CM

## 2022-07-29 DIAGNOSIS — Z13.29 SCREENING FOR THYROID DISORDER: ICD-10-CM

## 2022-07-29 DIAGNOSIS — Z11.59 ENCOUNTER FOR HEPATITIS C SCREENING TEST FOR LOW RISK PATIENT: ICD-10-CM

## 2022-07-29 DIAGNOSIS — Z13.1 DIABETES MELLITUS SCREENING: ICD-10-CM

## 2022-07-29 DIAGNOSIS — Z11.4 SCREENING FOR HIV WITHOUT PRESENCE OF RISK FACTORS: ICD-10-CM

## 2022-07-29 DIAGNOSIS — Z13.220 SCREENING FOR LIPID DISORDERS: ICD-10-CM

## 2022-07-29 LAB
ALBUMIN SERPL-MCNC: 4.3 G/DL (ref 3.5–5.2)
ALP BLD-CCNC: 86 U/L (ref 35–104)
ALT SERPL-CCNC: 27 U/L (ref 5–33)
ANION GAP SERPL CALCULATED.3IONS-SCNC: 14 MMOL/L (ref 7–19)
AST SERPL-CCNC: 21 U/L (ref 5–32)
BASOPHILS ABSOLUTE: 0 K/UL (ref 0–0.2)
BASOPHILS RELATIVE PERCENT: 0.6 % (ref 0–1)
BILIRUB SERPL-MCNC: 0.3 MG/DL (ref 0.2–1.2)
BUN BLDV-MCNC: 5 MG/DL (ref 6–20)
CALCIUM SERPL-MCNC: 8.9 MG/DL (ref 8.6–10)
CHLORIDE BLD-SCNC: 96 MMOL/L (ref 98–111)
CHOLESTEROL, TOTAL: 287 MG/DL (ref 160–199)
CO2: 26 MMOL/L (ref 22–29)
CREAT SERPL-MCNC: 0.6 MG/DL (ref 0.5–0.9)
EOSINOPHILS ABSOLUTE: 0.2 K/UL (ref 0–0.6)
EOSINOPHILS RELATIVE PERCENT: 3.2 % (ref 0–5)
GFR AFRICAN AMERICAN: >59
GFR NON-AFRICAN AMERICAN: >60
GLUCOSE BLD-MCNC: 93 MG/DL (ref 74–109)
HCT VFR BLD CALC: 42.2 % (ref 37–47)
HDLC SERPL-MCNC: 55 MG/DL (ref 65–121)
HEMOGLOBIN: 12.7 G/DL (ref 12–16)
HEPATITIS C ANTIBODY INTERPRETATION: NORMAL
HIV-1 P24 AG: NORMAL
IMMATURE GRANULOCYTES #: 0 K/UL
LDL CHOLESTEROL CALCULATED: 197 MG/DL
LYMPHOCYTES ABSOLUTE: 2.2 K/UL (ref 1.1–4.5)
LYMPHOCYTES RELATIVE PERCENT: 34.6 % (ref 20–40)
MCH RBC QN AUTO: 27.3 PG (ref 27–31)
MCHC RBC AUTO-ENTMCNC: 30.1 G/DL (ref 33–37)
MCV RBC AUTO: 90.8 FL (ref 81–99)
MONOCYTES ABSOLUTE: 0.3 K/UL (ref 0–0.9)
MONOCYTES RELATIVE PERCENT: 4.8 % (ref 0–10)
NEUTROPHILS ABSOLUTE: 3.7 K/UL (ref 1.5–7.5)
NEUTROPHILS RELATIVE PERCENT: 56.5 % (ref 50–65)
PDW BLD-RTO: 13 % (ref 11.5–14.5)
PLATELET # BLD: 226 K/UL (ref 130–400)
PMV BLD AUTO: 9.7 FL (ref 9.4–12.3)
POTASSIUM SERPL-SCNC: 4.1 MMOL/L (ref 3.5–5)
RAPID HIV 1&2: NORMAL
RBC # BLD: 4.65 M/UL (ref 4.2–5.4)
SODIUM BLD-SCNC: 136 MMOL/L (ref 136–145)
TOTAL PROTEIN: 7.5 G/DL (ref 6.6–8.7)
TRIGL SERPL-MCNC: 173 MG/DL (ref 0–149)
TSH SERPL DL<=0.05 MIU/L-ACNC: 2.52 UIU/ML (ref 0.27–4.2)
WBC # BLD: 6.5 K/UL (ref 4.8–10.8)

## 2022-08-12 RX ORDER — SUMATRIPTAN 100 MG/1
TABLET, FILM COATED ORAL
Qty: 9 TABLET | Refills: 2 | OUTPATIENT
Start: 2022-08-12

## 2022-08-12 RX ORDER — CYCLOBENZAPRINE HCL 10 MG
10 TABLET ORAL 2 TIMES DAILY PRN
Qty: 60 TABLET | Refills: 1 | OUTPATIENT
Start: 2022-08-12

## 2022-08-12 NOTE — TELEPHONE ENCOUNTER
[facility] called requesting a refill of the below medication which has been pended for you:     Requested Prescriptions     Pending Prescriptions Disp Refills    SUMAtriptan (IMITREX) 100 MG tablet [Pharmacy Med Name: SUMATRIPTAN SUCC 100 MG TABLET] 9 tablet 2     Sig: TAKE 1 TAB BY MOUTH ONCE AS NEEDED FOR MIGRAINE MAY REPEAT IN 2 HOURS IF NEEDED MAX 2 PER 24 HOURS    cyclobenzaprine (FLEXERIL) 10 MG tablet [Pharmacy Med Name: CYCLOBENZAPRINE 10 MG TABLET] 60 tablet 1     Sig: TAKE 1 TABLET BY MOUTH 2 TIMES DAILY AS NEEDED FOR MUSCLE SPASMS.        Last Appointment Date: 3/11/2022  Next Appointment Date: Visit date not found    Allergies   Allergen Reactions    Propofol Nausea And Vomiting

## 2022-08-15 RX ORDER — CYCLOBENZAPRINE HCL 10 MG
10 TABLET ORAL 2 TIMES DAILY PRN
Qty: 60 TABLET | Refills: 1 | OUTPATIENT
Start: 2022-08-15

## 2022-08-15 RX ORDER — SUMATRIPTAN 100 MG/1
100 TABLET, FILM COATED ORAL
Qty: 9 TABLET | Refills: 2 | OUTPATIENT
Start: 2022-08-15 | End: 2022-08-15

## 2022-08-22 RX ORDER — LOSARTAN POTASSIUM 25 MG/1
TABLET ORAL
Qty: 90 TABLET | OUTPATIENT
Start: 2022-08-22

## 2022-08-24 RX ORDER — CYCLOBENZAPRINE HCL 10 MG
10 TABLET ORAL 2 TIMES DAILY PRN
Qty: 60 TABLET | Refills: 1 | OUTPATIENT
Start: 2022-08-24

## 2022-08-29 DIAGNOSIS — M54.42 CHRONIC MIDLINE LOW BACK PAIN WITH LEFT-SIDED SCIATICA: Primary | ICD-10-CM

## 2022-08-29 DIAGNOSIS — R06.83 SNORING: Primary | ICD-10-CM

## 2022-08-29 DIAGNOSIS — G89.29 CHRONIC MIDLINE LOW BACK PAIN WITH LEFT-SIDED SCIATICA: Primary | ICD-10-CM

## 2022-08-29 RX ORDER — CYCLOBENZAPRINE HCL 10 MG
10 TABLET ORAL 2 TIMES DAILY PRN
Qty: 60 TABLET | Refills: 1 | OUTPATIENT
Start: 2022-08-29

## 2022-08-29 RX ORDER — CYCLOBENZAPRINE HCL 10 MG
10 TABLET ORAL 2 TIMES DAILY PRN
Qty: 60 TABLET | Refills: 1 | Status: SHIPPED | OUTPATIENT
Start: 2022-08-29

## 2022-08-29 NOTE — TELEPHONE ENCOUNTER
Requested Prescriptions     Pending Prescriptions Disp Refills    cyclobenzaprine (FLEXERIL) 10 MG tablet 60 tablet 1     Sig: Take 1 tablet by mouth 2 times daily as needed for Muscle spasms

## 2022-09-12 RX ORDER — AMITRIPTYLINE HYDROCHLORIDE 10 MG/1
10 TABLET, FILM COATED ORAL NIGHTLY
Qty: 30 TABLET | Refills: 2 | OUTPATIENT
Start: 2022-09-12

## 2022-09-12 RX ORDER — LOSARTAN POTASSIUM 25 MG/1
TABLET ORAL
Qty: 90 TABLET | OUTPATIENT
Start: 2022-09-12

## 2022-09-30 RX ORDER — AMITRIPTYLINE HYDROCHLORIDE 10 MG/1
10 TABLET, FILM COATED ORAL NIGHTLY
Qty: 30 TABLET | Refills: 2 | OUTPATIENT
Start: 2022-09-30

## 2022-09-30 RX ORDER — SUMATRIPTAN 100 MG/1
TABLET, FILM COATED ORAL
Qty: 9 TABLET | Refills: 2 | OUTPATIENT
Start: 2022-09-30

## 2022-10-04 RX ORDER — SUMATRIPTAN 100 MG/1
TABLET, FILM COATED ORAL
Qty: 9 TABLET | Refills: 2 | OUTPATIENT
Start: 2022-10-04

## 2022-10-04 RX ORDER — AMITRIPTYLINE HYDROCHLORIDE 10 MG/1
10 TABLET, FILM COATED ORAL NIGHTLY
Qty: 30 TABLET | Refills: 2 | OUTPATIENT
Start: 2022-10-04

## 2022-10-17 RX ORDER — AMITRIPTYLINE HYDROCHLORIDE 10 MG/1
10 TABLET, FILM COATED ORAL NIGHTLY
Qty: 30 TABLET | Refills: 2 | OUTPATIENT
Start: 2022-10-17

## 2022-10-18 DIAGNOSIS — M54.42 CHRONIC MIDLINE LOW BACK PAIN WITH LEFT-SIDED SCIATICA: Primary | ICD-10-CM

## 2022-10-18 DIAGNOSIS — G89.29 CHRONIC MIDLINE LOW BACK PAIN WITH LEFT-SIDED SCIATICA: Primary | ICD-10-CM

## 2022-10-18 RX ORDER — AMITRIPTYLINE HYDROCHLORIDE 10 MG/1
10 TABLET, FILM COATED ORAL NIGHTLY
Qty: 30 TABLET | Refills: 2 | Status: CANCELLED | OUTPATIENT
Start: 2022-10-18

## 2022-10-18 RX ORDER — SUMATRIPTAN 100 MG/1
100 TABLET, FILM COATED ORAL
Qty: 9 TABLET | Refills: 2 | Status: CANCELLED | OUTPATIENT
Start: 2022-10-18 | End: 2022-10-18

## 2022-10-18 RX ORDER — AMITRIPTYLINE HYDROCHLORIDE 10 MG/1
10 TABLET, FILM COATED ORAL NIGHTLY
Qty: 90 TABLET | Refills: 1 | Status: SHIPPED | OUTPATIENT
Start: 2022-10-18

## 2022-10-18 RX ORDER — SUMATRIPTAN 100 MG/1
100 TABLET, FILM COATED ORAL
Qty: 9 TABLET | Refills: 2 | Status: SHIPPED | OUTPATIENT
Start: 2022-10-18 | End: 2022-10-18

## 2022-10-22 DIAGNOSIS — F41.9 ANXIETY: ICD-10-CM

## 2022-10-24 RX ORDER — BUPROPION HYDROCHLORIDE 150 MG/1
TABLET ORAL
Qty: 30 TABLET | Refills: 5 | OUTPATIENT
Start: 2022-10-24

## 2022-11-17 DIAGNOSIS — G43.709 CHRONIC MIGRAINE WITHOUT AURA WITHOUT STATUS MIGRAINOSUS, NOT INTRACTABLE: ICD-10-CM

## 2022-11-17 DIAGNOSIS — I10 HYPERTENSION, UNSPECIFIED TYPE: ICD-10-CM

## 2022-11-27 DIAGNOSIS — G89.29 CHRONIC MIDLINE LOW BACK PAIN WITH LEFT-SIDED SCIATICA: ICD-10-CM

## 2022-11-27 DIAGNOSIS — M54.42 CHRONIC MIDLINE LOW BACK PAIN WITH LEFT-SIDED SCIATICA: ICD-10-CM

## 2022-11-27 NOTE — TELEPHONE ENCOUNTER
Natalie Moulton called to request a refill on her medication. Last office visit : 6/29/2022   Next office visit : 11/29/2022     Requested Prescriptions     Pending Prescriptions Disp Refills    cyclobenzaprine (FLEXERIL) 10 MG tablet [Pharmacy Med Name: CYCLOBENZAPRINE 10 MG TABLET] 60 tablet 1     Sig: TAKE 1 TABLET BY MOUTH 2 TIMES DAILY AS NEEDED FOR MUSCLE SPASMS.             Yaa Ramirez LPN

## 2022-11-28 RX ORDER — CYCLOBENZAPRINE HCL 10 MG
10 TABLET ORAL 2 TIMES DAILY PRN
Qty: 60 TABLET | Refills: 1 | Status: SHIPPED | OUTPATIENT
Start: 2022-11-28

## 2022-12-26 ASSESSMENT — ENCOUNTER SYMPTOMS
ABDOMINAL DISTENTION: 0
RHINORRHEA: 0
VOMITING: 0
DIARRHEA: 1
COUGH: 0
BACK PAIN: 1
BLOOD IN STOOL: 0
NAUSEA: 0
SHORTNESS OF BREATH: 0
WHEEZING: 0
APNEA: 0
CHEST TIGHTNESS: 0
SORE THROAT: 0
ABDOMINAL PAIN: 0
TROUBLE SWALLOWING: 0
CONSTIPATION: 1
SINUS PAIN: 0
COLOR CHANGE: 1

## 2022-12-26 NOTE — PROGRESS NOTES
Radha Esquivel ( 1985) is a 40 y.o. female,Established, here for evaluation of the following chief complaint(s). Follow-up Chronic Condition and Hypertension      Patient was encouraged and advised to be compliant with all  medications leads an active lifestyle and promote maintaining a healthy weight, encouraged not to use cigarettes, laboratory results discussed and reviewed with patient's during this visit   ASSESSMENT/PLAN:  Problem List          Circulatory    Hypertension      Well-controlled, continue current medications         Relevant Medications    metoprolol tartrate (LOPRESSOR) 25 MG tablet       Digestive    IBS (irritable bowel syndrome)      Borderline controlled, changes made today: has diarrhea, advised to take fiber to control loose watery stools         Relevant Medications    omeprazole (PRILOSEC) 10 MG delayed release capsule    dicyclomine (BENTYL) 10 MG capsule       Nervous and Auditory    Chronic midline low back pain with left-sided sciatica      Borderline controlled, changes made today: she recently fell in her driveway, advised to take muscle relaxant         Relevant Medications    amitriptyline (ELAVIL) 10 MG tablet    cyclobenzaprine (FLEXERIL) 10 MG tablet    buPROPion (WELLBUTRIN XL) 150 MG extended release tablet    diclofenac (VOLTAREN) 75 MG EC tablet       Other    Snoring      undergoing a home sleep study         Mixed hyperlipidemia    Relevant Medications    metoprolol tartrate (LOPRESSOR) 25 MG tablet    Class 2 obesity due to excess calories without serious comorbidity with body mass index (BMI) of 38.0 to 38.9 in adult      Uncontrolled, changes made today: would like to try GLP1, will try Ozempic         Relevant Medications    Semaglutide,0.25 or 0.5MG/DOS, 2 MG/1.5ML SOPN    Anxiety    Relevant Medications    amitriptyline (ELAVIL) 10 MG tablet    buPROPion (WELLBUTRIN XL) 150 MG extended release tablet       No flowsheet data found.     PHQ Scores 2022 3/11/2022 9/10/2021   PHQ2 Score 0 0 0   PHQ9 Score 0 0 0       Results for orders placed or performed in visit on 12/27/22   Lipid Panel   Result Value Ref Range    Cholesterol, Total 222 (H) 160 - 199 mg/dL    Triglycerides 153 (H) 0 - 149 mg/dL    HDL 39 (L) 65 - 121 mg/dL    LDL Calculated 152 <100 mg/dL   Comprehensive Metabolic Panel   Result Value Ref Range    Sodium 140 136 - 145 mmol/L    Potassium 4.4 3.5 - 5.0 mmol/L    Chloride 104 98 - 111 mmol/L    CO2 25 22 - 29 mmol/L    Anion Gap 11 7 - 19 mmol/L    Glucose 86 74 - 109 mg/dL    BUN 5 (L) 6 - 20 mg/dL    Creatinine 0.7 0.5 - 0.9 mg/dL    Est, Glom Filt Rate >60 >60    Calcium 9.4 8.6 - 10.0 mg/dL    Total Protein 7.5 6.6 - 8.7 g/dL    Albumin 4.3 3.5 - 5.2 g/dL    Total Bilirubin <0.2 0.2 - 1.2 mg/dL    Alkaline Phosphatase 67 35 - 104 U/L    ALT 16 5 - 33 U/L    AST 16 5 - 32 U/L   CBC with Auto Differential   Result Value Ref Range    WBC 5.9 4.8 - 10.8 K/uL    RBC 4.42 4.20 - 5.40 M/uL    Hemoglobin 12.6 12.0 - 16.0 g/dL    Hematocrit 39.7 37.0 - 47.0 %    MCV 89.8 81.0 - 99.0 fL    MCH 28.5 27.0 - 31.0 pg    MCHC 31.7 (L) 33.0 - 37.0 g/dL    RDW 13.1 11.5 - 14.5 %    Platelets 496 458 - 180 K/uL    MPV 10.2 9.4 - 12.3 fL    Neutrophils % 54.5 50.0 - 65.0 %    Lymphocytes % 32.9 20.0 - 40.0 %    Monocytes % 4.6 0.0 - 10.0 %    Eosinophils % 7.0 (H) 0.0 - 5.0 %    Basophils % 0.5 0.0 - 1.0 %    Neutrophils Absolute 3.2 1.5 - 7.5 K/uL    Immature Granulocytes # 0.0 K/uL    Lymphocytes Absolute 1.9 1.1 - 4.5 K/uL    Monocytes Absolute 0.30 0.00 - 0.90 K/uL    Eosinophils Absolute 0.40 0.00 - 0.60 K/uL    Basophils Absolute 0.00 0.00 - 0.20 K/uL       Return in about 6 months (around 6/27/2023).     HPI  78-year-old female presents for follow-up  Obesity has tried diet and exercise states that the having great difficulty would like to try Ozempic she was informed that her insurance will probably refuse it but she wanted me to try and send it  Blood pressure is in good range  Home sleep study awaiting completion  Rosacea failed topical she would like to try oral medication  Chronic low back pain in good control  GERD well-controlled    Review of Systems   Constitutional:  Negative for activity change, chills, fatigue and fever. HENT:  Negative for congestion, ear pain, hearing loss, mouth sores, nosebleeds, postnasal drip, rhinorrhea, sinus pain, sore throat and trouble swallowing. Eyes:  Negative for visual disturbance. Respiratory:  Negative for apnea, cough, chest tightness, shortness of breath and wheezing. Cardiovascular:  Negative for chest pain, palpitations and leg swelling. Gastrointestinal:  Positive for constipation and diarrhea. Negative for abdominal distention, abdominal pain, blood in stool, nausea and vomiting. Endocrine: Negative for cold intolerance, heat intolerance and polyuria. Genitourinary:  Negative for difficulty urinating, dysuria, flank pain, frequency and urgency. Musculoskeletal:  Positive for back pain. Negative for arthralgias and myalgias. Skin:  Positive for color change. Negative for rash and wound. Allergic/Immunologic: Negative for environmental allergies and food allergies. Neurological:  Positive for headaches. Negative for dizziness, tremors, seizures, syncope, speech difficulty, weakness, light-headedness and numbness. Hematological:  Negative for adenopathy. Does not bruise/bleed easily. Psychiatric/Behavioral:  Negative for confusion, decreased concentration, dysphoric mood, sleep disturbance and suicidal ideas. The patient is nervous/anxious. The patient is not hyperactive. Physical Exam  Vitals and nursing note reviewed. Constitutional:       General: She is not in acute distress. Appearance: Normal appearance. HENT:      Head: Normocephalic. Right Ear: External ear normal.      Left Ear: External ear normal.      Nose: Nose normal. No congestion or rhinorrhea. Mouth/Throat:      Mouth: Mucous membranes are moist.      Pharynx: No oropharyngeal exudate or posterior oropharyngeal erythema. Eyes:      General: No scleral icterus. Right eye: No discharge. Left eye: No discharge. Extraocular Movements: Extraocular movements intact. Conjunctiva/sclera: Conjunctivae normal.      Pupils: Pupils are equal, round, and reactive to light. Neck:      Thyroid: No thyromegaly. Vascular: No carotid bruit or JVD. Cardiovascular:      Rate and Rhythm: Normal rate and regular rhythm. Chest Wall: PMI is not displaced. Pulses: Normal pulses. Heart sounds: Normal heart sounds, S1 normal and S2 normal. No murmur heard. Pulmonary:      Effort: Pulmonary effort is normal. No respiratory distress. Breath sounds: Normal breath sounds and air entry. No decreased air movement or transmitted upper airway sounds. No decreased breath sounds, wheezing or rales. Abdominal:      General: Abdomen is flat. Bowel sounds are normal. There is no distension. Palpations: Abdomen is soft. There is no shifting dullness, hepatomegaly, splenomegaly or mass. Tenderness: There is no abdominal tenderness. There is no right CVA tenderness, left CVA tenderness, guarding or rebound. Hernia: No hernia is present. Musculoskeletal:         General: No deformity. Right shoulder: Normal.      Left shoulder: Normal.      Right elbow: Normal.      Left elbow: Normal.      Right wrist: Normal.      Left wrist: Normal.      Cervical back: Normal, normal range of motion and neck supple. No rigidity. Thoracic back: Normal.      Lumbar back: Tenderness present. Decreased range of motion. Right knee: Normal.      Left knee: Normal.      Right lower leg: No edema. Left lower leg: No edema. Right ankle: Normal.      Left ankle: Normal.   Lymphadenopathy:      Cervical: No cervical adenopathy.    Skin:     General: Skin is warm and moist. Findings: No lesion or rash. Comments: hyperpigmentation on the forehead, around the lips   Neurological:      Mental Status: She is alert. Mental status is at baseline. Cranial Nerves: No cranial nerve deficit. Motor: Motor function is intact. Coordination: Coordination is intact. Gait: Gait is intact. Deep Tendon Reflexes: Reflexes normal.   Psychiatric:         Attention and Perception: Attention normal.         Speech: Speech normal.         Behavior: Behavior normal.         Thought Content:  Thought content normal.         Judgment: Judgment normal.         (Time Documentation Optional [id number])    An electronic signaturewaas used to authenticate this note  -Geri Stanton MD on 12/28/2022 at 11:50 AM

## 2022-12-27 ENCOUNTER — OFFICE VISIT (OUTPATIENT)
Dept: PRIMARY CARE CLINIC | Age: 37
End: 2022-12-27
Payer: COMMERCIAL

## 2022-12-27 VITALS
DIASTOLIC BLOOD PRESSURE: 68 MMHG | HEART RATE: 70 BPM | SYSTOLIC BLOOD PRESSURE: 110 MMHG | TEMPERATURE: 97.1 F | HEIGHT: 69 IN | OXYGEN SATURATION: 98 % | RESPIRATION RATE: 20 BRPM | WEIGHT: 259 LBS | BODY MASS INDEX: 38.36 KG/M2

## 2022-12-27 DIAGNOSIS — G89.29 CHRONIC MIDLINE LOW BACK PAIN WITH LEFT-SIDED SCIATICA: ICD-10-CM

## 2022-12-27 DIAGNOSIS — Z13.220 SCREENING FOR LIPID DISORDERS: ICD-10-CM

## 2022-12-27 DIAGNOSIS — R06.83 SNORING: ICD-10-CM

## 2022-12-27 DIAGNOSIS — F41.9 ANXIETY: ICD-10-CM

## 2022-12-27 DIAGNOSIS — Z12.4 CERVICAL CANCER SCREENING: Primary | ICD-10-CM

## 2022-12-27 DIAGNOSIS — Z13.1 DIABETES MELLITUS SCREENING: ICD-10-CM

## 2022-12-27 DIAGNOSIS — M54.42 CHRONIC MIDLINE LOW BACK PAIN WITH LEFT-SIDED SCIATICA: ICD-10-CM

## 2022-12-27 DIAGNOSIS — K58.0 IRRITABLE BOWEL SYNDROME WITH DIARRHEA: ICD-10-CM

## 2022-12-27 DIAGNOSIS — Z13.0 SCREENING FOR DEFICIENCY ANEMIA: ICD-10-CM

## 2022-12-27 DIAGNOSIS — E66.09 CLASS 2 OBESITY DUE TO EXCESS CALORIES WITHOUT SERIOUS COMORBIDITY WITH BODY MASS INDEX (BMI) OF 38.0 TO 38.9 IN ADULT: ICD-10-CM

## 2022-12-27 DIAGNOSIS — L71.9 ROSACEA: ICD-10-CM

## 2022-12-27 DIAGNOSIS — I10 PRIMARY HYPERTENSION: ICD-10-CM

## 2022-12-27 DIAGNOSIS — E78.2 MIXED HYPERLIPIDEMIA: ICD-10-CM

## 2022-12-27 LAB
ALBUMIN SERPL-MCNC: 4.3 G/DL (ref 3.5–5.2)
ALP BLD-CCNC: 67 U/L (ref 35–104)
ALT SERPL-CCNC: 16 U/L (ref 5–33)
ANION GAP SERPL CALCULATED.3IONS-SCNC: 11 MMOL/L (ref 7–19)
AST SERPL-CCNC: 16 U/L (ref 5–32)
BASOPHILS ABSOLUTE: 0 K/UL (ref 0–0.2)
BASOPHILS RELATIVE PERCENT: 0.5 % (ref 0–1)
BILIRUB SERPL-MCNC: <0.2 MG/DL (ref 0.2–1.2)
BUN BLDV-MCNC: 5 MG/DL (ref 6–20)
CALCIUM SERPL-MCNC: 9.4 MG/DL (ref 8.6–10)
CHLORIDE BLD-SCNC: 104 MMOL/L (ref 98–111)
CHOLESTEROL, TOTAL: 222 MG/DL (ref 160–199)
CO2: 25 MMOL/L (ref 22–29)
CREAT SERPL-MCNC: 0.7 MG/DL (ref 0.5–0.9)
EOSINOPHILS ABSOLUTE: 0.4 K/UL (ref 0–0.6)
EOSINOPHILS RELATIVE PERCENT: 7 % (ref 0–5)
GFR SERPL CREATININE-BSD FRML MDRD: >60 ML/MIN/{1.73_M2}
GLUCOSE BLD-MCNC: 86 MG/DL (ref 74–109)
HCT VFR BLD CALC: 39.7 % (ref 37–47)
HDLC SERPL-MCNC: 39 MG/DL (ref 65–121)
HEMOGLOBIN: 12.6 G/DL (ref 12–16)
IMMATURE GRANULOCYTES #: 0 K/UL
LDL CHOLESTEROL CALCULATED: 152 MG/DL
LYMPHOCYTES ABSOLUTE: 1.9 K/UL (ref 1.1–4.5)
LYMPHOCYTES RELATIVE PERCENT: 32.9 % (ref 20–40)
MCH RBC QN AUTO: 28.5 PG (ref 27–31)
MCHC RBC AUTO-ENTMCNC: 31.7 G/DL (ref 33–37)
MCV RBC AUTO: 89.8 FL (ref 81–99)
MONOCYTES ABSOLUTE: 0.3 K/UL (ref 0–0.9)
MONOCYTES RELATIVE PERCENT: 4.6 % (ref 0–10)
NEUTROPHILS ABSOLUTE: 3.2 K/UL (ref 1.5–7.5)
NEUTROPHILS RELATIVE PERCENT: 54.5 % (ref 50–65)
PDW BLD-RTO: 13.1 % (ref 11.5–14.5)
PLATELET # BLD: 286 K/UL (ref 130–400)
PMV BLD AUTO: 10.2 FL (ref 9.4–12.3)
POTASSIUM SERPL-SCNC: 4.4 MMOL/L (ref 3.5–5)
RBC # BLD: 4.42 M/UL (ref 4.2–5.4)
SODIUM BLD-SCNC: 140 MMOL/L (ref 136–145)
TOTAL PROTEIN: 7.5 G/DL (ref 6.6–8.7)
TRIGL SERPL-MCNC: 153 MG/DL (ref 0–149)
WBC # BLD: 5.9 K/UL (ref 4.8–10.8)

## 2022-12-27 PROCEDURE — 3074F SYST BP LT 130 MM HG: CPT | Performed by: INTERNAL MEDICINE

## 2022-12-27 PROCEDURE — 99214 OFFICE O/P EST MOD 30 MIN: CPT | Performed by: INTERNAL MEDICINE

## 2022-12-27 PROCEDURE — 3078F DIAST BP <80 MM HG: CPT | Performed by: INTERNAL MEDICINE

## 2022-12-27 RX ORDER — DICYCLOMINE HYDROCHLORIDE 10 MG/1
10 CAPSULE ORAL 3 TIMES DAILY PRN
Qty: 270 CAPSULE | Refills: 1 | Status: SHIPPED | OUTPATIENT
Start: 2022-12-27

## 2022-12-27 RX ORDER — FLUTICASONE PROPIONATE 50 MCG
2 SPRAY, SUSPENSION (ML) NASAL DAILY
Qty: 16 G | Refills: 5 | Status: SHIPPED | OUTPATIENT
Start: 2022-12-27

## 2022-12-27 RX ORDER — TETRACYCLINE HYDROCHLORIDE 250 MG/1
250 CAPSULE ORAL 2 TIMES DAILY
Qty: 60 CAPSULE | Refills: 3 | Status: SHIPPED | OUTPATIENT
Start: 2022-12-27 | End: 2023-01-26

## 2022-12-27 RX ORDER — DICLOFENAC SODIUM 75 MG/1
75 TABLET, DELAYED RELEASE ORAL 2 TIMES DAILY PRN
Qty: 60 TABLET | Refills: 2 | Status: SHIPPED | OUTPATIENT
Start: 2022-12-27

## 2022-12-27 RX ORDER — CYCLOBENZAPRINE HCL 10 MG
10 TABLET ORAL 3 TIMES DAILY PRN
Qty: 60 TABLET | Refills: 1 | Status: SHIPPED | OUTPATIENT
Start: 2022-12-27 | End: 2023-01-26

## 2022-12-27 RX ORDER — OMEPRAZOLE 10 MG/1
10 CAPSULE, DELAYED RELEASE ORAL DAILY
COMMUNITY

## 2022-12-27 RX ORDER — BUPROPION HYDROCHLORIDE 150 MG/1
TABLET ORAL
Qty: 30 TABLET | Refills: 5 | Status: SHIPPED | OUTPATIENT
Start: 2022-12-27

## 2022-12-27 SDOH — ECONOMIC STABILITY: FOOD INSECURITY: WITHIN THE PAST 12 MONTHS, THE FOOD YOU BOUGHT JUST DIDN'T LAST AND YOU DIDN'T HAVE MONEY TO GET MORE.: NEVER TRUE

## 2022-12-27 SDOH — ECONOMIC STABILITY: FOOD INSECURITY: WITHIN THE PAST 12 MONTHS, YOU WORRIED THAT YOUR FOOD WOULD RUN OUT BEFORE YOU GOT MONEY TO BUY MORE.: NEVER TRUE

## 2022-12-27 ASSESSMENT — SOCIAL DETERMINANTS OF HEALTH (SDOH): HOW HARD IS IT FOR YOU TO PAY FOR THE VERY BASICS LIKE FOOD, HOUSING, MEDICAL CARE, AND HEATING?: NOT HARD AT ALL

## 2022-12-27 NOTE — PATIENT INSTRUCTIONS
Please schedule FOLLOW UP VISIT in  6  months  Have lab work 2 weeks before scheduled Follow up Visit  MAY 2023Patient Education        Learning About the [facility]  What is the [facility]? The Mediterranean diet is a style of eating rather than a diet plan. It features foods eaten in [place], [place], [place] and [place], and other countries along the [facility]. It emphasizes eating foods like fish, fruits, vegetables, beans, high-fiber breads and whole grains, nuts, and olive oil. This style of eating includes limited red meat, cheese, and sweets. Why choose the Mediterranean diet? A Mediterranean-style diet may improve heart health. It contains more fat than other heart-healthy diets. But the fats are mainly from nuts, unsaturated oils (such as fish oils and olive oil), and certain nut or seed oils (such as canola, soybean, or flaxseed oil). These fats may help protect the heart and blood vessels. How can you get started on the Mediterranean diet? Here are some things you can do to switch to a more Mediterranean way of eating. What to eat  Eat a variety of fruits and vegetables each day, such as grapes, blueberries, tomatoes, broccoli, peppers, figs, olives, spinach, eggplant, beans, lentils, and chickpeas. Eat a variety of whole-grain foods each day, such as oats, brown rice, and whole wheat bread, pasta, and couscous. Eat fish at least 2 times a week. Try tuna, salmon, mackerel, lake trout, herring, or sardines. Eat moderate amounts of low-fat dairy products, such as milk, cheese, or yogurt. Eat moderate amounts of poultry and eggs. Choose healthy (unsaturated) fats, such as nuts, olive oil, and certain nut or seed oils like canola, soybean, and flaxseed. Limit unhealthy (saturated) fats, such as butter, palm oil, and coconut oil. And limit fats found in animal products, such as meat and dairy products made with whole milk.  Try to eat red meat only a few times a month in very small amounts. Limit sweets and desserts to only a few times a week. This includes sugar-sweetened drinks like soda. The Mediterranean diet may also include red wine with your meal--1 glass each day for women and up to 2 glasses a day for men. Tips for eating at home  Use herbs, spices, garlic, lemon zest, and citrus juice instead of salt to add flavor to foods. Add avocado slices to your sandwich instead of seay. Have fish for lunch or dinner instead of red meat. Brush the fish with olive oil, and broil or grill it. Sprinkle your salad with seeds or nuts instead of cheese. Cook with olive or canola oil instead of butter or oils that are high in saturated fat. Switch from 2% milk or whole milk to 1% or fat-free milk. Dip raw vegetables in a vinaigrette dressing or hummus instead of dips made from mayonnaise or sour cream.  Have a piece of fruit for dessert instead of a piece of cake. Try baked apples, or have some dried fruit. Tips for eating out  Try broiled, grilled, baked, or poached fish instead of having it fried or breaded. Ask your  to have your meals prepared with olive oil instead of butter. Order dishes made with marinara sauce or sauces made from olive oil. Avoid sauces made from cream or mayonnaise. Choose whole-grain breads, whole wheat pasta and pizza crust, brown rice, beans, and lentils. Cut back on butter or margarine on bread. Instead, you can dip your bread in a small amount of olive oil. Ask for a side salad or grilled vegetables instead of french fries or chips. Where can you learn more? Go to http://www.woods.com/ and enter O407 to learn more about \"Learning About the Mediterranean Diet. \"  Current as of: May 9, 2022               Content Version: 13.5  © 8956-4894 [employer]. Care instructions adapted under license by [facility] ([facility]).  If you have questions about a medical condition or this instruction, always ask your healthcare professional. [address] any warranty or liability for your use of this information.

## 2022-12-28 NOTE — ASSESSMENT & PLAN NOTE
Borderline controlled, changes made today: has diarrhea, advised to take fiber to control loose watery stools

## 2022-12-28 NOTE — ASSESSMENT & PLAN NOTE
Borderline controlled, changes made today: she recently fell in her driveway, advised to take muscle relaxant

## 2023-01-04 DIAGNOSIS — I10 HYPERTENSION, UNSPECIFIED TYPE: ICD-10-CM

## 2023-01-04 DIAGNOSIS — G43.709 CHRONIC MIGRAINE WITHOUT AURA WITHOUT STATUS MIGRAINOSUS, NOT INTRACTABLE: ICD-10-CM

## 2023-01-05 NOTE — TELEPHONE ENCOUNTER
Amanda Mamta called to request a refill on her medication.       Last office visit : 12/27/2022   Next office visit : 6/27/2023     Requested Prescriptions     Pending Prescriptions Disp Refills    metoprolol tartrate (LOPRESSOR) 25 MG tablet [Pharmacy Med Name: METOPROLOL TARTRATE 25 MG TAB] 180 tablet 1     Sig: TAKE 1 TABLET BY MOUTH TWICE A DAY            Ellyn Rodriguez LPN

## 2023-01-30 ENCOUNTER — OFFICE VISIT (OUTPATIENT)
Dept: OBGYN CLINIC | Age: 38
End: 2023-01-30
Payer: COMMERCIAL

## 2023-01-30 VITALS
SYSTOLIC BLOOD PRESSURE: 139 MMHG | HEART RATE: 97 BPM | HEIGHT: 69 IN | BODY MASS INDEX: 38.66 KG/M2 | WEIGHT: 261 LBS | DIASTOLIC BLOOD PRESSURE: 86 MMHG

## 2023-01-30 DIAGNOSIS — M54.42 CHRONIC MIDLINE LOW BACK PAIN WITH LEFT-SIDED SCIATICA: ICD-10-CM

## 2023-01-30 DIAGNOSIS — N92.1 MENORRHAGIA WITH IRREGULAR CYCLE: Primary | ICD-10-CM

## 2023-01-30 DIAGNOSIS — E66.9 OBESITY (BMI 30-39.9): ICD-10-CM

## 2023-01-30 DIAGNOSIS — E66.09 CLASS 2 OBESITY DUE TO EXCESS CALORIES WITHOUT SERIOUS COMORBIDITY WITH BODY MASS INDEX (BMI) OF 38.0 TO 38.9 IN ADULT: ICD-10-CM

## 2023-01-30 DIAGNOSIS — G89.29 CHRONIC MIDLINE LOW BACK PAIN WITH LEFT-SIDED SCIATICA: ICD-10-CM

## 2023-01-30 DIAGNOSIS — Z30.41 ORAL CONTRACEPTIVE PILL SURVEILLANCE: ICD-10-CM

## 2023-01-30 DIAGNOSIS — I10 PRIMARY HYPERTENSION: ICD-10-CM

## 2023-01-30 DIAGNOSIS — F41.9 ANXIETY: ICD-10-CM

## 2023-01-30 DIAGNOSIS — R06.83 SNORING: ICD-10-CM

## 2023-01-30 PROCEDURE — 3079F DIAST BP 80-89 MM HG: CPT | Performed by: NURSE PRACTITIONER

## 2023-01-30 PROCEDURE — 3075F SYST BP GE 130 - 139MM HG: CPT | Performed by: NURSE PRACTITIONER

## 2023-01-30 PROCEDURE — 99214 OFFICE O/P EST MOD 30 MIN: CPT | Performed by: NURSE PRACTITIONER

## 2023-01-30 RX ORDER — LIRAGLUTIDE 6 MG/ML
0.6 INJECTION, SOLUTION SUBCUTANEOUS WEEKLY
Qty: 1 ADJUSTABLE DOSE PRE-FILLED PEN SYRINGE | Refills: 2 | Status: SHIPPED | OUTPATIENT
Start: 2023-01-30

## 2023-01-30 RX ORDER — NORETHINDRONE ACETATE AND ETHINYL ESTRADIOL AND FERROUS FUMARATE 1MG-20(24)
1 KIT ORAL DAILY
Qty: 3 PACKET | Refills: 3 | Status: SHIPPED | OUTPATIENT
Start: 2023-01-30

## 2023-01-30 ASSESSMENT — ENCOUNTER SYMPTOMS
EYES NEGATIVE: 1
GASTROINTESTINAL NEGATIVE: 1
RESPIRATORY NEGATIVE: 1
BACK PAIN: 1
ALLERGIC/IMMUNOLOGIC NEGATIVE: 1

## 2023-01-30 NOTE — PROGRESS NOTES
Jamaica Lester is a 40 y.o. female who presents today for her medical conditions/ complaints as noted below. Jamaica Lester is c/o of Vaginal Bleeding        HPI  Patient presents today with c/o irregular bleeding- heavy flow, hormones out of control, had bleeding for 5 days, stopped for a few days and started again and has now been bleeding for 7. She was told to stop ocp ( takes for cramps) but then bleeding and pain become worse so she restarted with no relief. She did better bleeding wise when off birth control, but cramps were bad and periods heavy. She is engaged and getting  in . Would like to continue a birth control. Wanting to get on injection for her weight. Declines infection check today, had labs per pcp, normal  Patient's last menstrual period was 2023 (approximate).       Past Medical History:   Diagnosis Date    Hyperlipidemia     Hypertension     IBS (irritable bowel syndrome)     Migraines      Past Surgical History:   Procedure Laterality Date    KNEE ARTHROSCOPY Left     UPPER GASTROINTESTINAL ENDOSCOPY      WISDOM TOOTH EXTRACTION       Family History   Problem Relation Age of Onset    Migraines Mother     Hypertension Father     Elevated Lipids Father     Lung Cancer Maternal Grandfather     Migraines Paternal Grandmother     Cancer Paternal Grandfather         mouth     Social History     Tobacco Use    Smoking status: Never    Smokeless tobacco: Never   Substance Use Topics    Alcohol use: Yes     Comment: occasion       Current Outpatient Medications   Medication Sig Dispense Refill    Norethin Ace-Eth Estrad-FE 1-20 MG-MCG(24) TABS Take 1 tablet by mouth daily 3 packet 3    liraglutide-weight management (SAXENDA) 18 MG/3ML SOPN Inject 0.6 mg into the skin once a week Can increase by 0.6mg weekly as tolerated to maximum dose of 3mg 1 Adjustable Dose Pre-filled Pen Syringe 2    metoprolol tartrate (LOPRESSOR) 25 MG tablet TAKE 1 TABLET BY MOUTH TWICE A DAY 180 tablet 1    omeprazole (PRILOSEC) 10 MG delayed release capsule Take 10 mg by mouth daily      buPROPion (WELLBUTRIN XL) 150 MG extended release tablet TAKE 1 TABLET BY MOUTH EVERY DAY IN THE MORNING 30 tablet 5    fluticasone (FLONASE) 50 MCG/ACT nasal spray 2 sprays by Each Nostril route daily 16 g 5    dicyclomine (BENTYL) 10 MG capsule Take 1 capsule by mouth 3 times daily as needed (abdominal cramping) 270 capsule 1    diclofenac (VOLTAREN) 75 MG EC tablet Take 1 tablet by mouth 2 times daily as needed for Pain 60 tablet 2    amitriptyline (ELAVIL) 10 MG tablet Take 1 tablet by mouth nightly 90 tablet 1    Levonorgest-Eth Estrad-Fe Bisg (BALCOLTRA) 0.1-20 MG-MCG(21) TABS TAKE 1 TABLET BY MOUTH EVERY DAY 28 tablet 9    SUMAtriptan (IMITREX) 100 MG tablet Take 1 tablet by mouth once as needed for Migraine .  May repeat in 2 hours if needed.  Do not exceed 2 tablets in 24 hours. 9 tablet 2     No current facility-administered medications for this visit.     Allergies   Allergen Reactions    Propofol Nausea And Vomiting     Vitals:    01/30/23 1342   BP: 139/86   Pulse: 97     Body mass index is 38.54 kg/m².    Review of Systems   Constitutional: Negative.    HENT: Negative.     Eyes: Negative.    Respiratory: Negative.     Cardiovascular: Negative.    Gastrointestinal: Negative.    Endocrine: Negative.    Genitourinary:  Positive for menstrual problem. Negative for difficulty urinating, dyspareunia, dysuria, enuresis, frequency, hematuria, pelvic pain, urgency and vaginal discharge.   Musculoskeletal:  Positive for back pain.   Skin: Negative.    Allergic/Immunologic: Negative.    Neurological: Negative.    Hematological: Negative.    Psychiatric/Behavioral:  The patient is nervous/anxious.        Physical Exam  Vitals and nursing note reviewed.   Constitutional:       General: She is not in acute distress.     Appearance: She is well-developed. She is not diaphoretic.   HENT:      Head: Normocephalic and  atraumatic. Eyes:      Conjunctiva/sclera: Conjunctivae normal.      Pupils: Pupils are equal, round, and reactive to light. Pulmonary:      Effort: Pulmonary effort is normal.   Abdominal:      Tenderness: There is no guarding. Musculoskeletal:         General: Normal range of motion. Cervical back: Normal range of motion. Comments: Normal ROM in all 4 extremities; normal gait   Skin:     General: Skin is warm and dry. Neurological:      Mental Status: She is alert and oriented to person, place, and time. Motor: No abnormal muscle tone. Coordination: Coordination normal.   Psychiatric:         Behavior: Behavior normal.        Diagnosis Orders   1. Menorrhagia with irregular cycle  Norethin Ace-Eth Estrad-FE 1-20 MG-MCG(24) TABS      2. Oral contraceptive pill surveillance  Norethin Ace-Eth Estrad-FE 1-20 MG-MCG(24) TABS      3. Obesity (BMI 30-39.9)        4. Class 2 obesity due to excess calories without serious comorbidity with body mass index (BMI) of 38.0 to 38.9 in adult  liraglutide-weight management (SAXENDA) 18 MG/3ML SOPN      5. Snoring  liraglutide-weight management (SAXENDA) 18 MG/3ML SOPN      6. Primary hypertension  liraglutide-weight management (SAXENDA) 18 MG/3ML SOPN      7. Chronic midline low back pain with left-sided sciatica  liraglutide-weight management (SAXENDA) 18 MG/3ML SOPN      8.  Anxiety  liraglutide-weight management (SAXENDA) 18 MG/3ML SOPN          MEDICATIONS:  Orders Placed This Encounter   Medications    Norethin Ace-Eth Estrad-FE 1-20 MG-MCG(24) TABS     Sig: Take 1 tablet by mouth daily     Dispense:  3 packet     Refill:  3    liraglutide-weight management (SAXENDA) 18 MG/3ML SOPN     Sig: Inject 0.6 mg into the skin once a week Can increase by 0.6mg weekly as tolerated to maximum dose of 3mg     Dispense:  1 Adjustable Dose Pre-filled Pen Syringe     Refill:  2       ORDERS:  No orders of the defined types were placed in this encounter. PLAN:  Will try a different ocp as she wants to stick w that method. Follow in march for physical. If bleeding no better, consider lysteda    After discussion, will try [facility] for her dx's and to help w wt loss and increase fertility for when she is ready to conceive. Over 50% of the total visit time of 30 minutes was spent on counseling and/or coordination of care of menses, weight and associated complications from both. All questions were answered and the patient voiced understanding. There are no Patient Instructions on file for this visit.

## 2023-02-03 DIAGNOSIS — M54.42 CHRONIC MIDLINE LOW BACK PAIN WITH LEFT-SIDED SCIATICA: ICD-10-CM

## 2023-02-03 DIAGNOSIS — G89.29 CHRONIC MIDLINE LOW BACK PAIN WITH LEFT-SIDED SCIATICA: ICD-10-CM

## 2023-02-06 DIAGNOSIS — G89.29 CHRONIC MIDLINE LOW BACK PAIN WITH LEFT-SIDED SCIATICA: ICD-10-CM

## 2023-02-06 DIAGNOSIS — M54.42 CHRONIC MIDLINE LOW BACK PAIN WITH LEFT-SIDED SCIATICA: ICD-10-CM

## 2023-02-06 RX ORDER — AMITRIPTYLINE HYDROCHLORIDE 10 MG/1
10 TABLET, FILM COATED ORAL NIGHTLY
Qty: 90 TABLET | Refills: 1 | Status: SHIPPED | OUTPATIENT
Start: 2023-02-06

## 2023-02-06 RX ORDER — AMITRIPTYLINE HYDROCHLORIDE 10 MG/1
10 TABLET, FILM COATED ORAL NIGHTLY
Qty: 90 TABLET | Refills: 1 | OUTPATIENT
Start: 2023-02-06

## 2023-02-06 RX ORDER — OMEPRAZOLE 10 MG/1
10 CAPSULE, DELAYED RELEASE ORAL DAILY
Qty: 90 CAPSULE | Refills: 1 | Status: SHIPPED | OUTPATIENT
Start: 2023-02-06

## 2023-02-06 NOTE — TELEPHONE ENCOUNTER
Floralicia Anatoly called to request a refill on her medication.       Last office visit : 12/27/2022   Next office visit : 6/27/2023     Requested Prescriptions     Pending Prescriptions Disp Refills    omeprazole (PRILOSEC) 10 MG delayed release capsule 90 capsule 1     Sig: Take 1 capsule by mouth daily    amitriptyline (ELAVIL) 10 MG tablet 90 tablet 1     Sig: Take 1 tablet by mouth nightly            Jeanette Jackson LPN

## 2023-02-20 RX ORDER — PEN NEEDLE, DIABETIC 32 GX 1/4"
NEEDLE, DISPOSABLE MISCELLANEOUS
Qty: 1 EACH | Refills: 2 | Status: SHIPPED | OUTPATIENT
Start: 2023-02-20

## 2023-03-05 DIAGNOSIS — M54.42 CHRONIC MIDLINE LOW BACK PAIN WITH LEFT-SIDED SCIATICA: ICD-10-CM

## 2023-03-05 DIAGNOSIS — G89.29 CHRONIC MIDLINE LOW BACK PAIN WITH LEFT-SIDED SCIATICA: ICD-10-CM

## 2023-03-06 RX ORDER — CYCLOBENZAPRINE HCL 10 MG
10 TABLET ORAL 2 TIMES DAILY PRN
Qty: 60 TABLET | Refills: 1 | Status: SHIPPED | OUTPATIENT
Start: 2023-03-06

## 2023-03-06 NOTE — TELEPHONE ENCOUNTER
Jamaica Putnam called to request a refill on her medication.      Last office visit : 12/27/2022   Next office visit : 6/27/2023     Requested Prescriptions     Pending Prescriptions Disp Refills    cyclobenzaprine (FLEXERIL) 10 MG tablet [Pharmacy Med Name: CYCLOBENZAPRINE 10 MG TABLET] 60 tablet 1     Sig: TAKE 1 TABLET BY MOUTH 2 TIMES DAILY AS NEEDED FOR MUSCLE SPASMS.            Beatriz Garcia LPN

## 2023-03-07 DIAGNOSIS — M54.42 CHRONIC MIDLINE LOW BACK PAIN WITH LEFT-SIDED SCIATICA: ICD-10-CM

## 2023-03-07 DIAGNOSIS — G89.29 CHRONIC MIDLINE LOW BACK PAIN WITH LEFT-SIDED SCIATICA: ICD-10-CM

## 2023-03-07 RX ORDER — SUMATRIPTAN 100 MG/1
100 TABLET, FILM COATED ORAL
Qty: 9 TABLET | Refills: 2 | Status: SHIPPED | OUTPATIENT
Start: 2023-03-07 | End: 2023-03-07

## 2023-03-07 NOTE — TELEPHONE ENCOUNTER
Tova Steve called to request a refill on her medication. Last office visit : 12/27/2022   Next office visit : 6/27/2023     Requested Prescriptions     Pending Prescriptions Disp Refills    SUMAtriptan (IMITREX) 100 MG tablet [Pharmacy Med Name: SUMATRIPTAN SUCC 100 MG TABLET] 9 tablet 2     Sig: TAKE 1 TABLET BY MOUTH ONCE AS NEEDED FOR MIGRAINE . MAY REPEAT IN 2 HOURS IF NEEDED. DO NOT EXCEED 2 TABLETS IN 24 HOURS.             Jeannette Caceres LPN

## 2023-03-27 RX ORDER — DICLOFENAC SODIUM 75 MG/1
TABLET, DELAYED RELEASE ORAL
Qty: 60 TABLET | Refills: 2 | Status: SHIPPED | OUTPATIENT
Start: 2023-03-27

## 2023-04-25 ENCOUNTER — HOSPITAL ENCOUNTER (EMERGENCY)
Facility: HOSPITAL | Age: 38
Discharge: HOME | End: 2023-04-25
Attending: EMERGENCY MEDICINE | Admitting: FAMILY MEDICINE
Payer: COMMERCIAL

## 2023-04-25 ENCOUNTER — APPOINTMENT (OUTPATIENT)
Dept: CT IMAGING | Facility: HOSPITAL | Age: 38
End: 2023-04-25
Payer: COMMERCIAL

## 2023-04-25 VITALS
OXYGEN SATURATION: 98 % | HEART RATE: 100 BPM | HEIGHT: 69 IN | SYSTOLIC BLOOD PRESSURE: 154 MMHG | WEIGHT: 240 LBS | TEMPERATURE: 97.6 F | BODY MASS INDEX: 35.55 KG/M2 | DIASTOLIC BLOOD PRESSURE: 81 MMHG | RESPIRATION RATE: 16 BRPM

## 2023-04-25 DIAGNOSIS — R11.2 NAUSEA AND VOMITING, UNSPECIFIED VOMITING TYPE: Primary | ICD-10-CM

## 2023-04-25 DIAGNOSIS — R19.7 DIARRHEA, UNSPECIFIED TYPE: ICD-10-CM

## 2023-04-25 PROBLEM — M54.42 CHRONIC MIDLINE LOW BACK PAIN WITH LEFT-SIDED SCIATICA: Status: ACTIVE | Noted: 2022-06-29

## 2023-04-25 PROBLEM — R06.83 SNORING: Status: ACTIVE | Noted: 2022-06-29

## 2023-04-25 PROBLEM — G89.29 CHRONIC MIDLINE LOW BACK PAIN WITH LEFT-SIDED SCIATICA: Status: ACTIVE | Noted: 2022-06-29

## 2023-04-25 PROBLEM — I10 HYPERTENSION: Status: ACTIVE | Noted: 2023-04-25

## 2023-04-25 PROBLEM — N92.1 MENORRHAGIA WITH IRREGULAR CYCLE: Status: ACTIVE | Noted: 2021-09-10

## 2023-04-25 PROBLEM — N93.8 DUB (DYSFUNCTIONAL UTERINE BLEEDING): Status: ACTIVE | Noted: 2022-06-29

## 2023-04-25 PROBLEM — K58.9 IBS (IRRITABLE BOWEL SYNDROME): Status: ACTIVE | Noted: 2023-04-25

## 2023-04-25 PROBLEM — F41.9 ANXIETY: Status: ACTIVE | Noted: 2021-09-10

## 2023-04-25 PROBLEM — K21.9 GERD WITHOUT ESOPHAGITIS: Status: ACTIVE | Noted: 2022-06-29

## 2023-04-25 PROBLEM — G43.909 MIGRAINES: Status: ACTIVE | Noted: 2023-04-25

## 2023-04-25 PROBLEM — Z87.2: Status: ACTIVE | Noted: 2022-06-29

## 2023-04-25 PROBLEM — E66.812 CLASS 2 OBESITY DUE TO EXCESS CALORIES WITHOUT SERIOUS COMORBIDITY WITH BODY MASS INDEX (BMI) OF 38.0 TO 38.9 IN ADULT: Status: ACTIVE | Noted: 2022-06-29

## 2023-04-25 PROBLEM — E78.2 MIXED HYPERLIPIDEMIA: Status: ACTIVE | Noted: 2021-09-10

## 2023-04-25 PROBLEM — E66.09 CLASS 2 OBESITY DUE TO EXCESS CALORIES WITHOUT SERIOUS COMORBIDITY WITH BODY MASS INDEX (BMI) OF 38.0 TO 38.9 IN ADULT: Status: ACTIVE | Noted: 2022-06-29

## 2023-04-25 LAB
ALBUMIN SERPL-MCNC: 4.4 G/DL (ref 3.5–5.2)
ALBUMIN/GLOB SERPL: 1.3 G/DL
ALP SERPL-CCNC: 86 U/L (ref 39–117)
ALT SERPL W P-5'-P-CCNC: 23 U/L (ref 1–33)
ANION GAP SERPL CALCULATED.3IONS-SCNC: 12 MMOL/L (ref 5–15)
AST SERPL-CCNC: 18 U/L (ref 1–32)
B-HCG UR QL: NEGATIVE
BASOPHILS # BLD AUTO: 0.06 10*3/MM3 (ref 0–0.2)
BASOPHILS NFR BLD AUTO: 0.7 % (ref 0–1.5)
BILIRUB SERPL-MCNC: 0.3 MG/DL (ref 0–1.2)
BUN SERPL-MCNC: 2 MG/DL (ref 6–20)
BUN/CREAT SERPL: 3 (ref 7–25)
CALCIUM SPEC-SCNC: 9 MG/DL (ref 8.6–10.5)
CHLORIDE SERPL-SCNC: 103 MMOL/L (ref 98–107)
CO2 SERPL-SCNC: 23 MMOL/L (ref 22–29)
CREAT SERPL-MCNC: 0.67 MG/DL (ref 0.57–1)
DEPRECATED RDW RBC AUTO: 42.6 FL (ref 37–54)
EGFRCR SERPLBLD CKD-EPI 2021: 116.3 ML/MIN/1.73
EOSINOPHIL # BLD AUTO: 0.28 10*3/MM3 (ref 0–0.4)
EOSINOPHIL NFR BLD AUTO: 3.2 % (ref 0.3–6.2)
ERYTHROCYTE [DISTWIDTH] IN BLOOD BY AUTOMATED COUNT: 13.1 % (ref 12.3–15.4)
EXPIRATION DATE: NORMAL
GLOBULIN UR ELPH-MCNC: 3.5 GM/DL
GLUCOSE SERPL-MCNC: 124 MG/DL (ref 65–99)
HCT VFR BLD AUTO: 43.1 % (ref 34–46.6)
HGB BLD-MCNC: 13.5 G/DL (ref 12–15.9)
IMM GRANULOCYTES # BLD AUTO: 0.02 10*3/MM3 (ref 0–0.05)
IMM GRANULOCYTES NFR BLD AUTO: 0.2 % (ref 0–0.5)
INTERNAL NEGATIVE CONTROL: NEGATIVE
INTERNAL POSITIVE CONTROL: POSITIVE
LIPASE SERPL-CCNC: 28 U/L (ref 13–60)
LYMPHOCYTES # BLD AUTO: 1.73 10*3/MM3 (ref 0.7–3.1)
LYMPHOCYTES NFR BLD AUTO: 19.9 % (ref 19.6–45.3)
Lab: NORMAL
MCH RBC QN AUTO: 28 PG (ref 26.6–33)
MCHC RBC AUTO-ENTMCNC: 31.3 G/DL (ref 31.5–35.7)
MCV RBC AUTO: 89.4 FL (ref 79–97)
MONOCYTES # BLD AUTO: 0.5 10*3/MM3 (ref 0.1–0.9)
MONOCYTES NFR BLD AUTO: 5.7 % (ref 5–12)
NEUTROPHILS NFR BLD AUTO: 6.12 10*3/MM3 (ref 1.7–7)
NEUTROPHILS NFR BLD AUTO: 70.3 % (ref 42.7–76)
NRBC BLD AUTO-RTO: 0 /100 WBC (ref 0–0.2)
PLATELET # BLD AUTO: 306 10*3/MM3 (ref 140–450)
PMV BLD AUTO: 9.6 FL (ref 6–12)
POTASSIUM SERPL-SCNC: 4 MMOL/L (ref 3.5–5.2)
PROT SERPL-MCNC: 7.9 G/DL (ref 6–8.5)
RBC # BLD AUTO: 4.82 10*6/MM3 (ref 3.77–5.28)
SODIUM SERPL-SCNC: 138 MMOL/L (ref 136–145)
WBC NRBC COR # BLD: 8.71 10*3/MM3 (ref 3.4–10.8)

## 2023-04-25 PROCEDURE — 83690 ASSAY OF LIPASE: CPT | Performed by: EMERGENCY MEDICINE

## 2023-04-25 PROCEDURE — 99283 EMERGENCY DEPT VISIT LOW MDM: CPT

## 2023-04-25 PROCEDURE — 81025 URINE PREGNANCY TEST: CPT | Performed by: EMERGENCY MEDICINE

## 2023-04-25 PROCEDURE — 25510000001 IOPAMIDOL 61 % SOLUTION: Performed by: EMERGENCY MEDICINE

## 2023-04-25 PROCEDURE — 85025 COMPLETE CBC W/AUTO DIFF WBC: CPT | Performed by: EMERGENCY MEDICINE

## 2023-04-25 PROCEDURE — 80053 COMPREHEN METABOLIC PANEL: CPT | Performed by: EMERGENCY MEDICINE

## 2023-04-25 PROCEDURE — 74177 CT ABD & PELVIS W/CONTRAST: CPT

## 2023-04-25 RX ORDER — DICYCLOMINE HYDROCHLORIDE 10 MG/1
10 CAPSULE ORAL
Qty: 40 CAPSULE | Refills: 0 | Status: SHIPPED | OUTPATIENT
Start: 2023-04-25 | End: 2023-05-05

## 2023-04-25 RX ORDER — SODIUM CHLORIDE 0.9 % (FLUSH) 0.9 %
10 SYRINGE (ML) INJECTION AS NEEDED
Status: DISCONTINUED | OUTPATIENT
Start: 2023-04-25 | End: 2023-04-25 | Stop reason: HOSPADM

## 2023-04-25 RX ORDER — ONDANSETRON 4 MG/1
4 TABLET, ORALLY DISINTEGRATING ORAL EVERY 8 HOURS PRN
Qty: 12 TABLET | Refills: 0 | Status: SHIPPED | OUTPATIENT
Start: 2023-04-25

## 2023-04-25 RX ADMIN — SODIUM CHLORIDE 1000 ML: 9 INJECTION, SOLUTION INTRAVENOUS at 16:44

## 2023-04-25 RX ADMIN — IOPAMIDOL 100 ML: 612 INJECTION, SOLUTION INTRAVENOUS at 17:31

## 2023-04-25 NOTE — ED PROVIDER NOTES
Subjective   History of Present Illness  37-year-old lady who came to the ED complaining abdominal pain and discomfort epigastric pain and diarrhea with some blood in it.  Some nausea no vomiting decreased urine output.    Diarrhea  The primary symptoms include nausea, diarrhea and hematochezia. Primary symptoms do not include fever, weight loss, hematemesis, jaundice or myalgias. The illness began 3 to 5 days ago.   The illness is also significant for chills. The illness does not include dysphagia, bloating, constipation, back pain or itching. Associated medical issues do not include inflammatory bowel disease, GERD, gallstones, bowel resection, irritable bowel syndrome or hemorrhoids.       Review of Systems   Constitutional: Positive for chills. Negative for fever and weight loss.   HENT: Negative.    Eyes: Negative.    Respiratory: Negative.    Cardiovascular: Negative.    Gastrointestinal: Positive for diarrhea, hematochezia and nausea. Negative for bloating, constipation, dysphagia, hematemesis and jaundice.   Endocrine: Negative.    Genitourinary: Negative.    Musculoskeletal: Negative for back pain and myalgias.   Skin: Negative.  Negative for itching.   Neurological: Negative.    Hematological: Negative.    All other systems reviewed and are negative.      Past Medical History:   Diagnosis Date   • Hypertension    • Rosacea        Allergies   Allergen Reactions   • Propofol Nausea And Vomiting       Past Surgical History:   Procedure Laterality Date   • COLONOSCOPY     • ENDOSCOPY     • KNEE SURGERY Left        No family history on file.    Social History     Socioeconomic History   • Marital status: Single   Tobacco Use   • Smoking status: Never   • Smokeless tobacco: Never   Substance and Sexual Activity   • Alcohol use: Yes     Comment: social   • Drug use: Never           Objective   Physical Exam  Vitals and nursing note reviewed. Exam conducted with a chaperone present.   Constitutional:        General: She is awake. She is not in acute distress.     Appearance: Normal appearance. She is well-developed. She is not toxic-appearing.   HENT:      Head: Normocephalic and atraumatic.      Nose:      Right Nostril: No epistaxis.      Left Nostril: No epistaxis.   Eyes:      General: Lids are normal. No scleral icterus.     Conjunctiva/sclera: Conjunctivae normal.      Pupils: Pupils are equal, round, and reactive to light.   Neck:      Vascular: No hepatojugular reflux or JVD.   Cardiovascular:      Rate and Rhythm: Normal rate and regular rhythm.      Chest Wall: PMI is not displaced.      Pulses: Normal pulses. No decreased pulses.      Heart sounds: Normal heart sounds. No murmur heard.  Pulmonary:      Effort: Pulmonary effort is normal. No accessory muscle usage or respiratory distress.      Breath sounds: Normal breath sounds. No decreased breath sounds or wheezing.   Abdominal:      General: Abdomen is flat. Bowel sounds are normal. There is no distension or abdominal bruit.      Palpations: Abdomen is soft. There is no shifting dullness, fluid wave, mass or pulsatile mass.      Tenderness: There is abdominal tenderness in the epigastric area. There is no right CVA tenderness, left CVA tenderness, guarding or rebound.      Hernia: No hernia is present.   Musculoskeletal:         General: Normal range of motion.      Cervical back: Normal range of motion and neck supple. No rigidity.      Right lower leg: No edema.      Left lower leg: No edema.   Skin:     General: Skin is warm and dry.      Capillary Refill: Capillary refill takes less than 2 seconds.      Coloration: Skin is not cyanotic, jaundiced, mottled or pale.   Neurological:      General: No focal deficit present.      Mental Status: She is alert and oriented to person, place, and time. Mental status is at baseline.      GCS: GCS eye subscore is 4. GCS verbal subscore is 5. GCS motor subscore is 6.      Cranial Nerves: No cranial nerve deficit.       Sensory: Sensation is intact.      Motor: Motor function is intact.      Deep Tendon Reflexes: Reflexes are normal and symmetric.   Psychiatric:         Behavior: Behavior normal. Behavior is cooperative.         Procedures           ED Course                                           Medical Decision Making  DD   I considered gastric etiology, gastritis, gastroenteritis, peptic ulcer disease, gastroesophageal reflux disease, , gallbladder disease, liver disease, pancreatic disease, biliary colic, cholecystitis, cholelithiasis, hepatitis, pancreatitis, cholangitis, porphyria and diabetic ketoacidosis as a possible cause of abdominal pain in this patient. This is a partial list of diagnoses considered.        Risk  Prescription drug management.          Final diagnoses:   None       ED Disposition  ED Disposition     None          No follow-up provider specified.       Medication List      No changes were made to your prescriptions during this visit.          Sage Verdugo MD  04/25/23 9884

## 2023-04-25 NOTE — ED PROVIDER NOTES
Subjective   History of Present Illness     Please see Dr. Sage Verdugo's note for complete HPI and physical.    Review of Systems    Past Medical History:   Diagnosis Date   • Hypertension    • Rosacea        Allergies   Allergen Reactions   • Propofol Nausea And Vomiting       Past Surgical History:   Procedure Laterality Date   • COLONOSCOPY     • ENDOSCOPY     • KNEE SURGERY Left        No family history on file.    Social History     Socioeconomic History   • Marital status: Single   Tobacco Use   • Smoking status: Never   • Smokeless tobacco: Never   Substance and Sexual Activity   • Alcohol use: Yes     Comment: social   • Drug use: Never           Objective   Physical Exam    Procedures           ED Course  ED Course as of 04/25/23 1908 Tue Apr 25, 2023 1722 Turned over to Dr Corral  [TS]      ED Course User Index  [TS] Sage Verdugo MD                                          Lab Results (last 24 hours)     Procedure Component Value Units Date/Time    CBC & Differential [[id number]]  (Abnormal) Collected: 04/25/23 1643    Specimen: Blood Updated: 04/25/23 1652    Narrative:      The following orders were created for panel order CBC & Differential.  Procedure                               Abnormality         Status                     ---------                               -----------         ------                     CBC Auto Differential[[id number]]        Abnormal            Final result                 Please view results for these tests on the individual orders.    Comprehensive Metabolic Panel [[id number]]  (Abnormal) Collected: 04/25/23 1643    Specimen: Blood Updated: 04/25/23 1720     Glucose 124 mg/dL      BUN 2 mg/dL      Creatinine 0.67 mg/dL      Sodium 138 mmol/L      Potassium 4.0 mmol/L      Chloride 103 mmol/L      CO2 23.0 mmol/L      Calcium 9.0 mg/dL      Total Protein 7.9 g/dL      Albumin 4.4 g/dL      ALT (SGPT) 23 U/L      AST (SGOT) 18 U/L      Alkaline Phosphatase 86 U/L       Total Bilirubin 0.3 mg/dL      Globulin 3.5 gm/dL      A/G Ratio 1.3 g/dL      BUN/Creatinine Ratio 3.0     Anion Gap 12.0 mmol/L      eGFR 116.3 mL/min/1.73     Narrative:      GFR Normal >60  Chronic Kidney Disease <60  Kidney Failure <15      Lipase [[id number]]  (Normal) Collected: 04/25/23 1643    Specimen: Blood Updated: 04/25/23 1704     Lipase 28 U/L     CBC Auto Differential [[id number]]  (Abnormal) Collected: 04/25/23 1643    Specimen: Blood Updated: 04/25/23 1652     WBC 8.71 10*3/mm3      RBC 4.82 10*6/mm3      Hemoglobin 13.5 g/dL      Hematocrit 43.1 %      MCV 89.4 fL      MCH 28.0 pg      MCHC 31.3 g/dL      RDW 13.1 %      RDW-SD 42.6 fl      MPV 9.6 fL      Platelets 306 10*3/mm3      Neutrophil % 70.3 %      Lymphocyte % 19.9 %      Monocyte % 5.7 %      Eosinophil % 3.2 %      Basophil % 0.7 %      Immature Grans % 0.2 %      Neutrophils, Absolute 6.12 10*3/mm3      Lymphocytes, Absolute 1.73 10*3/mm3      Monocytes, Absolute 0.50 10*3/mm3      Eosinophils, Absolute 0.28 10*3/mm3      Basophils, Absolute 0.06 10*3/mm3      Immature Grans, Absolute 0.02 10*3/mm3      nRBC 0.0 /100 WBC     POC Pregnancy, Urine [[id number]]  (Normal) Collected: 04/25/23 1717    Specimen: Urine Updated: 04/25/23 1717     HCG, Urine, QL Negative     Lot Number [id number]     Internal Positive Control Positive     Internal Negative Control Negative     Expiration Date 4/30/2024        EXAMINATION: CT ABDOMEN PELVIS W CONTRAST-      4/25/2023 5:28 PM CDT     HISTORY: Abdominal pain with nausea and vomiting. Diarrhea for 3 days.     In order to have a CT radiation dose as low as reasonably achievable  Automated Exposure Control was utilized for adjustment of the mA and/or  KV according to patient size.     DLP in mGycm= 975.     Abdomen/pelvis CT with IV contrast injection.  Axial, sagittal, and coronal sequences.     No comparison.     Normal heart size.  Clear lung bases.     Normal liver.  Normal pancreas and  spleen.  Spleen = 107 x 48 x 96 mm.     A few large rim calcified gallstones are present.  There is no sign of cholecystitis and there is no bile duct dilation.     Normal adrenal glands and kidneys.  Normal aorta and IVC.     There is mild dilation of a few small bowel loops within the left lower  quadrant and decompression of distal small bowel.  There is some associated mild mesenteric edema and trace free pelvic  fluid.  This appearance could be seen with low-grade partial obstruction or  enteritis.     There is no appendicitis.  There is no diverticulitis or colitis.     There is a normal appearance of the uterus.  CT appearance of an incidental 28 x 22 mm left ovarian cyst or follicle  is noted.     Summary:  1. Gallstones with no sign of cholecystitis or bile duct dilatation.  2. Mild prominence of a few left lower quadrant small bowel loops with  mild mesenteric edema which may represent enteritis or low-grade partial  obstruction.  3. No mass, perforation, or abscess.       This report was finalized on 04/25/2023 17:43 by Dr. Robbie Macdonald MD.      Medical Decision Making  37-year-old female is here in the emergency room for vomiting diarrhea.  Patient CT scan was pending at time of patient care when he was transitioned over to me.  Patient CT scan does show some gallstones with no signs of cholecystitis or bile duct dilation.  Patient also does have mild prominence of a few left lower small quadrant small bowel loops.  Which mild mesenteric edema which may represent enteritis or low-grade partial obstruction.  Patient has no mass, perforation or abscess.  Patient will be discharged home in stable condition.  Patient will be advised to follow-up with her primary care provider.  All questions were answered for patient prior to discharge.  Patient verbalized understanding was agreeable to plan as discussed.    Diarrhea, unspecified type: complicated acute illness or injury  Nausea and vomiting, unspecified  vomiting type: complicated acute illness or injury  Amount and/or Complexity of Data Reviewed  Labs: ordered.  Radiology: ordered.      Risk  Prescription drug management.          Final diagnoses:   Nausea and vomiting, unspecified vomiting type   Diarrhea, unspecified type       ED Disposition  ED Disposition     ED Disposition   Discharge    Condition   Stable    Comment   --             PATIENT CONNECTION - [facility], [place] [address]  [phone]  Call       [facility] Emergency Department  [facility] [facility] [address] [phone]    As needed, If symptoms worsen         Medication List      Changed    * dicyclomine 10 MG capsule  Commonly known as: BENTYL  What changed: Another medication with the same name was added. Make sure you understand how and when to take each.     * dicyclomine 10 MG capsule  Commonly known as: BENTYL  Take 1 capsule by mouth 4 (Four) Times a Day Before Meals & at Bedtime for 10 days.  What changed: You were already taking a medication with the same name, and this prescription was added. Make sure you understand how and when to take each.     * ondansetron ODT 4 MG disintegrating tablet  Commonly known as: ZOFRAN-ODT  What changed: Another medication with the same name was added. Make sure you understand how and when to take each.     * ondansetron ODT 4 MG disintegrating tablet  Commonly known as: ZOFRAN-ODT  Place 1 tablet on the tongue Every 8 (Eight) Hours As Needed for Nausea or Vomiting.  What changed: You were already taking a medication with the same name, and this prescription was added. Make sure you understand how and when to take each.         * This list has 4 medication(s) that are the same as other medications prescribed for you. Read the directions carefully, and ask your doctor or other care provider to review them with you.               Where to Get Your Medications      These medications were sent to [facility]/pharmacy #[id number] -  [place], [place] - [address] [facility] - [phone]  - [phone] FX  [address], [facility] [address]    Phone: [phone]   · dicyclomine 10 MG capsule  · ondansetron ODT 4 MG disintegrating tablet          Cory Corral MD  04/25/23 1903

## 2023-05-04 RX ORDER — DICYCLOMINE HYDROCHLORIDE 10 MG/1
10 CAPSULE ORAL 3 TIMES DAILY PRN
Qty: 270 CAPSULE | Refills: 1 | Status: SHIPPED | OUTPATIENT
Start: 2023-05-04

## 2023-06-05 ENCOUNTER — OFFICE VISIT (OUTPATIENT)
Dept: OBGYN CLINIC | Age: 38
End: 2023-06-05
Payer: COMMERCIAL

## 2023-06-05 VITALS
HEIGHT: 69 IN | BODY MASS INDEX: 36.29 KG/M2 | SYSTOLIC BLOOD PRESSURE: 139 MMHG | HEART RATE: 86 BPM | WEIGHT: 245 LBS | DIASTOLIC BLOOD PRESSURE: 84 MMHG

## 2023-06-05 DIAGNOSIS — E66.09 CLASS 2 OBESITY DUE TO EXCESS CALORIES WITHOUT SERIOUS COMORBIDITY WITH BODY MASS INDEX (BMI) OF 36.0 TO 36.9 IN ADULT: ICD-10-CM

## 2023-06-05 DIAGNOSIS — F41.9 ANXIETY: ICD-10-CM

## 2023-06-05 DIAGNOSIS — Z01.419 WELL WOMAN EXAM WITH ROUTINE GYNECOLOGICAL EXAM: Primary | ICD-10-CM

## 2023-06-05 DIAGNOSIS — Z11.51 SCREENING FOR HPV (HUMAN PAPILLOMAVIRUS): ICD-10-CM

## 2023-06-05 DIAGNOSIS — I10 PRIMARY HYPERTENSION: ICD-10-CM

## 2023-06-05 DIAGNOSIS — N92.1 BREAKTHROUGH BLEEDING ON BIRTH CONTROL PILLS: ICD-10-CM

## 2023-06-05 DIAGNOSIS — Z30.09 ENCOUNTER FOR COUNSELING REGARDING CONTRACEPTION: ICD-10-CM

## 2023-06-05 DIAGNOSIS — G89.29 CHRONIC MIDLINE LOW BACK PAIN WITH LEFT-SIDED SCIATICA: ICD-10-CM

## 2023-06-05 DIAGNOSIS — M54.42 CHRONIC MIDLINE LOW BACK PAIN WITH LEFT-SIDED SCIATICA: ICD-10-CM

## 2023-06-05 DIAGNOSIS — R06.83 SNORING: ICD-10-CM

## 2023-06-05 DIAGNOSIS — Z12.4 SCREENING FOR CERVICAL CANCER: ICD-10-CM

## 2023-06-05 DIAGNOSIS — Z30.41 ORAL CONTRACEPTIVE PILL SURVEILLANCE: ICD-10-CM

## 2023-06-05 PROCEDURE — 3075F SYST BP GE 130 - 139MM HG: CPT | Performed by: NURSE PRACTITIONER

## 2023-06-05 PROCEDURE — 3079F DIAST BP 80-89 MM HG: CPT | Performed by: NURSE PRACTITIONER

## 2023-06-05 PROCEDURE — 99213 OFFICE O/P EST LOW 20 MIN: CPT | Performed by: NURSE PRACTITIONER

## 2023-06-05 PROCEDURE — 99395 PREV VISIT EST AGE 18-39: CPT | Performed by: NURSE PRACTITIONER

## 2023-06-05 RX ORDER — ETONOGESTREL AND ETHINYL ESTRADIOL 11.7; 2.7 MG/1; MG/1
1 INSERT, EXTENDED RELEASE VAGINAL
Qty: 3 EACH | Refills: 3 | Status: SHIPPED | OUTPATIENT
Start: 2023-06-05

## 2023-06-05 RX ORDER — LIRAGLUTIDE 6 MG/ML
0.6 INJECTION, SOLUTION SUBCUTANEOUS WEEKLY
Qty: 1 ADJUSTABLE DOSE PRE-FILLED PEN SYRINGE | Refills: 2 | Status: SHIPPED | OUTPATIENT
Start: 2023-06-05

## 2023-06-05 ASSESSMENT — ENCOUNTER SYMPTOMS
ALLERGIC/IMMUNOLOGIC NEGATIVE: 1
BACK PAIN: 1
GASTROINTESTINAL NEGATIVE: 1
RESPIRATORY NEGATIVE: 1
EYES NEGATIVE: 1

## 2023-06-05 NOTE — PROGRESS NOTES
MG capsule TAKE 1 CAPSULE BY MOUTH 3 TIMES DAILY AS NEEDED (ABDOMINAL CRAMPING) 270 capsule 1    ondansetron (ZOFRAN-ODT) 4 MG disintegrating tablet Take 1 tablet by mouth 3 times daily as needed for Nausea or Vomiting 21 tablet 0    diclofenac (VOLTAREN) 75 MG EC tablet TAKE 1 TABLET BY MOUTH TWICE A DAY AS NEEDED FOR PAIN 60 tablet 2    SUMAtriptan (IMITREX) 100 MG tablet TAKE 1 TABLET BY MOUTH ONCE AS NEEDED FOR MIGRAINE . MAY REPEAT IN 2 HOURS IF NEEDED. DO NOT EXCEED 2 TABLETS IN 24 HOURS. 9 tablet 2    cyclobenzaprine (FLEXERIL) 10 MG tablet TAKE 1 TABLET BY MOUTH 2 TIMES DAILY AS NEEDED FOR MUSCLE SPASMS. 60 tablet 1    Insulin Pen Needle (NOVOFINE PEN NEEDLE) 32G X 6 MM MISC Use with saxenda 1 each 2    omeprazole (PRILOSEC) 10 MG delayed release capsule Take 1 capsule by mouth daily 90 capsule 1    amitriptyline (ELAVIL) 10 MG tablet Take 1 tablet by mouth nightly 90 tablet 1    Norethin Ace-Eth Estrad-FE 1-20 MG-MCG(24) TABS Take 1 tablet by mouth daily 3 packet 3    metoprolol tartrate (LOPRESSOR) 25 MG tablet TAKE 1 TABLET BY MOUTH TWICE A  tablet 1    buPROPion (WELLBUTRIN XL) 150 MG extended release tablet TAKE 1 TABLET BY MOUTH EVERY DAY IN THE MORNING 30 tablet 5    fluticasone (FLONASE) 50 MCG/ACT nasal spray 2 sprays by Each Nostril route daily 16 g 5     No current facility-administered medications for this visit. Allergies   Allergen Reactions    Propofol Nausea And Vomiting     Vitals:    06/05/23 1405   BP: 139/84   Pulse: 86     Body mass index is 36.18 kg/m². Review of Systems   Constitutional: Negative. HENT: Negative. Eyes: Negative. Respiratory: Negative. Cardiovascular: Negative. Gastrointestinal: Negative. Endocrine: Negative. Genitourinary:  Positive for menstrual problem. Negative for difficulty urinating, dyspareunia, dysuria, enuresis, frequency, hematuria, pelvic pain, urgency and vaginal discharge. Musculoskeletal:  Positive for back pain.

## 2023-06-09 LAB
HPV HR 12 DNA SPEC QL NAA+PROBE: NOT DETECTED
HPV16 DNA SPEC QL NAA+PROBE: NOT DETECTED
HPV16+18+H RISK 12 DNA SPEC-IMP: NORMAL
HPV18 DNA SPEC QL NAA+PROBE: NOT DETECTED

## 2023-06-26 ASSESSMENT — ENCOUNTER SYMPTOMS
DIARRHEA: 1
COUGH: 0
SORE THROAT: 0
COLOR CHANGE: 1
CHEST TIGHTNESS: 0
RHINORRHEA: 0
SHORTNESS OF BREATH: 0
ABDOMINAL DISTENTION: 0
BACK PAIN: 1
APNEA: 0
ABDOMINAL PAIN: 0
VOMITING: 0
NAUSEA: 0
BLOOD IN STOOL: 0
SINUS PAIN: 0
WHEEZING: 0
TROUBLE SWALLOWING: 0
CONSTIPATION: 1

## 2023-06-27 ENCOUNTER — OFFICE VISIT (OUTPATIENT)
Dept: PRIMARY CARE CLINIC | Age: 38
End: 2023-06-27
Payer: COMMERCIAL

## 2023-06-27 VITALS
OXYGEN SATURATION: 98 % | SYSTOLIC BLOOD PRESSURE: 122 MMHG | WEIGHT: 246 LBS | DIASTOLIC BLOOD PRESSURE: 80 MMHG | RESPIRATION RATE: 18 BRPM | HEIGHT: 69 IN | HEART RATE: 64 BPM | TEMPERATURE: 97.6 F | BODY MASS INDEX: 36.43 KG/M2

## 2023-06-27 DIAGNOSIS — K21.9 GERD WITHOUT ESOPHAGITIS: Primary | ICD-10-CM

## 2023-06-27 DIAGNOSIS — E78.2 MIXED HYPERLIPIDEMIA: ICD-10-CM

## 2023-06-27 DIAGNOSIS — G43.709 CHRONIC MIGRAINE WITHOUT AURA WITHOUT STATUS MIGRAINOSUS, NOT INTRACTABLE: ICD-10-CM

## 2023-06-27 DIAGNOSIS — N64.9 BREAST LESION: ICD-10-CM

## 2023-06-27 DIAGNOSIS — R06.83 SNORING: ICD-10-CM

## 2023-06-27 DIAGNOSIS — I10 PRIMARY HYPERTENSION: ICD-10-CM

## 2023-06-27 DIAGNOSIS — G89.29 CHRONIC MIDLINE LOW BACK PAIN WITH LEFT-SIDED SCIATICA: ICD-10-CM

## 2023-06-27 DIAGNOSIS — I10 HYPERTENSION, UNSPECIFIED TYPE: ICD-10-CM

## 2023-06-27 DIAGNOSIS — E66.09 CLASS 2 OBESITY DUE TO EXCESS CALORIES WITHOUT SERIOUS COMORBIDITY WITH BODY MASS INDEX (BMI) OF 36.0 TO 36.9 IN ADULT: ICD-10-CM

## 2023-06-27 DIAGNOSIS — E66.09 CLASS 2 OBESITY DUE TO EXCESS CALORIES WITHOUT SERIOUS COMORBIDITY WITH BODY MASS INDEX (BMI) OF 38.0 TO 38.9 IN ADULT: ICD-10-CM

## 2023-06-27 DIAGNOSIS — F41.9 ANXIETY: ICD-10-CM

## 2023-06-27 DIAGNOSIS — M54.42 CHRONIC MIDLINE LOW BACK PAIN WITH LEFT-SIDED SCIATICA: ICD-10-CM

## 2023-06-27 DIAGNOSIS — Z80.3 FAMILY HISTORY OF BREAST CANCER: ICD-10-CM

## 2023-06-27 PROCEDURE — 3078F DIAST BP <80 MM HG: CPT | Performed by: INTERNAL MEDICINE

## 2023-06-27 PROCEDURE — 3074F SYST BP LT 130 MM HG: CPT | Performed by: INTERNAL MEDICINE

## 2023-06-27 PROCEDURE — 99214 OFFICE O/P EST MOD 30 MIN: CPT | Performed by: INTERNAL MEDICINE

## 2023-06-27 RX ORDER — LIRAGLUTIDE 6 MG/ML
0.6 INJECTION, SOLUTION SUBCUTANEOUS WEEKLY
Qty: 1 ADJUSTABLE DOSE PRE-FILLED PEN SYRINGE | Refills: 2 | Status: SHIPPED | OUTPATIENT
Start: 2023-06-27

## 2023-06-27 RX ORDER — SUMATRIPTAN 100 MG/1
100 TABLET, FILM COATED ORAL DAILY
Qty: 9 TABLET | Refills: 2 | Status: SHIPPED | OUTPATIENT
Start: 2023-06-27

## 2023-06-27 RX ORDER — OMEPRAZOLE 10 MG/1
10 CAPSULE, DELAYED RELEASE ORAL DAILY
Qty: 90 CAPSULE | Refills: 1 | Status: SHIPPED | OUTPATIENT
Start: 2023-06-27

## 2023-06-27 RX ORDER — AMITRIPTYLINE HYDROCHLORIDE 10 MG/1
10 TABLET, FILM COATED ORAL NIGHTLY
Qty: 90 TABLET | Refills: 1 | Status: SHIPPED | OUTPATIENT
Start: 2023-06-27

## 2023-06-27 RX ORDER — CYCLOBENZAPRINE HCL 10 MG
10 TABLET ORAL 2 TIMES DAILY PRN
Qty: 60 TABLET | Refills: 1 | Status: SHIPPED | OUTPATIENT
Start: 2023-06-27

## 2023-06-27 RX ORDER — BUPROPION HYDROCHLORIDE 150 MG/1
TABLET ORAL
Qty: 30 TABLET | Refills: 5 | Status: SHIPPED | OUTPATIENT
Start: 2023-06-27

## 2023-06-27 SDOH — ECONOMIC STABILITY: FOOD INSECURITY: WITHIN THE PAST 12 MONTHS, THE FOOD YOU BOUGHT JUST DIDN'T LAST AND YOU DIDN'T HAVE MONEY TO GET MORE.: NEVER TRUE

## 2023-06-27 SDOH — ECONOMIC STABILITY: HOUSING INSECURITY
IN THE LAST 12 MONTHS, WAS THERE A TIME WHEN YOU DID NOT HAVE A STEADY PLACE TO SLEEP OR SLEPT IN A SHELTER (INCLUDING NOW)?: NO

## 2023-06-27 SDOH — ECONOMIC STABILITY: FOOD INSECURITY: WITHIN THE PAST 12 MONTHS, YOU WORRIED THAT YOUR FOOD WOULD RUN OUT BEFORE YOU GOT MONEY TO BUY MORE.: NEVER TRUE

## 2023-06-27 SDOH — ECONOMIC STABILITY: INCOME INSECURITY: HOW HARD IS IT FOR YOU TO PAY FOR THE VERY BASICS LIKE FOOD, HOUSING, MEDICAL CARE, AND HEATING?: NOT HARD AT ALL

## 2023-06-27 ASSESSMENT — PATIENT HEALTH QUESTIONNAIRE - PHQ9
2. FEELING DOWN, DEPRESSED OR HOPELESS: NOT AT ALL
SUM OF ALL RESPONSES TO PHQ9 QUESTIONS 1 & 2: 0
SUM OF ALL RESPONSES TO PHQ QUESTIONS 1-9: 0
SUM OF ALL RESPONSES TO PHQ QUESTIONS 1-9: 0
1. LITTLE INTEREST OR PLEASURE IN DOING THINGS: NOT AT ALL
2. FEELING DOWN, DEPRESSED OR HOPELESS: 0
SUM OF ALL RESPONSES TO PHQ QUESTIONS 1-9: 0
SUM OF ALL RESPONSES TO PHQ9 QUESTIONS 1 & 2: 0
SUM OF ALL RESPONSES TO PHQ QUESTIONS 1-9: 0
1. LITTLE INTEREST OR PLEASURE IN DOING THINGS: 0

## 2023-07-31 DIAGNOSIS — L71.9 ROSACEA: ICD-10-CM

## 2023-07-31 RX ORDER — FLUTICASONE PROPIONATE 50 MCG
SPRAY, SUSPENSION (ML) NASAL
Qty: 3 EACH | Refills: 1 | Status: SHIPPED | OUTPATIENT
Start: 2023-07-31

## 2023-07-31 RX ORDER — TETRACYCLINE HYDROCHLORIDE 250 MG/1
CAPSULE ORAL
Qty: 60 CAPSULE | Refills: 3 | Status: SHIPPED | OUTPATIENT
Start: 2023-07-31

## 2023-07-31 NOTE — TELEPHONE ENCOUNTER
Trudy Elizabeth called to request a refill on her medication.       Last office visit : 6/27/2023   Next office visit : 12/28/2023     Requested Prescriptions     Pending Prescriptions Disp Refills    fluticasone (FLONASE) 50 MCG/ACT nasal spray [Pharmacy Med Name: FLUTICASONE PROP 50 MCG SPRAY]  1     Sig: SPRAY 2 SPRAYS INTO EACH NOSTRIL EVERY DAY    tetracycline (ACHROMYCIN;SUMYCIN) 250 MG capsule [Pharmacy Med Name: TETRACYCLINE 250 MG CAPSULE] 60 capsule 3     Sig: TAKE 1 CAPSULE BY MOUTH TWICE A DAY            Janean Monday, LPN

## 2023-08-02 DIAGNOSIS — N92.1 MENORRHAGIA WITH IRREGULAR CYCLE: ICD-10-CM

## 2023-08-02 DIAGNOSIS — Z30.41 ORAL CONTRACEPTIVE PILL SURVEILLANCE: ICD-10-CM

## 2023-08-02 RX ORDER — NORETHINDRONE ACETATE AND ETHINYL ESTRADIOL AND FERROUS FUMARATE 1MG-20(24)
1 KIT ORAL DAILY
Qty: 3 PACKET | Refills: 3 | Status: SHIPPED | OUTPATIENT
Start: 2023-08-02

## 2023-09-29 ENCOUNTER — OFFICE VISIT (OUTPATIENT)
Dept: PRIMARY CARE CLINIC | Age: 38
End: 2023-09-29
Payer: COMMERCIAL

## 2023-09-29 VITALS
TEMPERATURE: 97.1 F | OXYGEN SATURATION: 98 % | HEART RATE: 77 BPM | WEIGHT: 248 LBS | HEIGHT: 69 IN | DIASTOLIC BLOOD PRESSURE: 78 MMHG | SYSTOLIC BLOOD PRESSURE: 118 MMHG | BODY MASS INDEX: 36.73 KG/M2

## 2023-09-29 DIAGNOSIS — B35.1 ONYCHOMYCOSIS: Primary | ICD-10-CM

## 2023-09-29 PROCEDURE — 3074F SYST BP LT 130 MM HG: CPT | Performed by: NURSE PRACTITIONER

## 2023-09-29 PROCEDURE — 3078F DIAST BP <80 MM HG: CPT | Performed by: NURSE PRACTITIONER

## 2023-09-29 PROCEDURE — 99213 OFFICE O/P EST LOW 20 MIN: CPT | Performed by: NURSE PRACTITIONER

## 2023-09-29 RX ORDER — TERBINAFINE HYDROCHLORIDE 250 MG/1
250 TABLET ORAL DAILY
Qty: 84 TABLET | Refills: 0 | Status: SHIPPED | OUTPATIENT
Start: 2023-09-29 | End: 2023-12-22

## 2023-09-29 NOTE — ASSESSMENT & PLAN NOTE
Patient here today with concerns of thickened great toenails and concern for persistent infection. She states she had pedicure before her wedding 2 weeks ago. She was treated with Keflex and Mupirocin due to bleeding and pus. Thickened bilateral great toenails present of exam. Will treat for onychomycosis using oral Terbinafine. Encouraged her to call back if not improving and will consider referral to podiatry.

## 2023-10-03 RX ORDER — FLUCONAZOLE 150 MG/1
150 TABLET ORAL DAILY
Qty: 3 TABLET | Refills: 0 | Status: SHIPPED | OUTPATIENT
Start: 2023-10-03 | End: 2023-10-06

## 2023-10-05 ASSESSMENT — ENCOUNTER SYMPTOMS
VOMITING: 0
SORE THROAT: 0
NAUSEA: 0
COUGH: 0
CHEST TIGHTNESS: 0
DIARRHEA: 0
COLOR CHANGE: 0
ABDOMINAL PAIN: 0
SHORTNESS OF BREATH: 0

## 2023-10-06 NOTE — PROGRESS NOTES
2023     Álvaro Cm (:  1985) is a 40 y.o. female,Established patient, here for evaluation of the following chief complaint(s):  Nail Problem      ASSESSMENT/PLAN:  1. Onychomycosis  Assessment & Plan:  Patient here today with concerns of thickened great toenails and concern for persistent infection. She states she had pedicure before her wedding 2 weeks ago. She was treated with Keflex and Mupirocin due to bleeding and pus. Thickened bilateral great toenails present of exam. Will treat for onychomycosis using oral Terbinafine. Encouraged her to call back if not improving and will consider referral to podiatry. Orders:  -     terbinafine (LAMISIL) 250 MG tablet; Take 1 tablet by mouth daily, Disp-84 tablet, R-0Normal      Return if symptoms worsen or fail to improve. SUBJECTIVE/OBJECTIVE:  HPI    Prior to Visit Medications    Medication Sig Taking? Authorizing Provider   fluconazole (DIFLUCAN) 150 MG tablet Take 1 tablet by mouth daily for 3 days Yes ZEESHAN Gann CNP   terbinafine (LAMISIL) 250 MG tablet Take 1 tablet by mouth daily Yes ZEESHAN Gann CNP   fluticasone (FLONASE) 50 MCG/ACT nasal spray SPRAY 2 SPRAYS INTO EACH NOSTRIL EVERY DAY Yes Bethany Peña MD   tetracycline (ACHROMYCIN;SUMYCIN) 250 MG capsule TAKE 1 CAPSULE BY MOUTH TWICE A DAY Yes Bethany Peña MD   buPROPion (WELLBUTRIN XL) 150 MG extended release tablet TAKE 1 TABLET BY MOUTH EVERY DAY IN THE MORNING Yes Neeru Peña MD   metoprolol tartrate (LOPRESSOR) 25 MG tablet Take 1 tablet by mouth 2 times daily Yes Bethany Peña MD   amitriptyline (ELAVIL) 10 MG tablet Take 1 tablet by mouth nightly Yes Bethany Peña MD   omeprazole (PRILOSEC) 10 MG delayed release capsule Take 1 capsule by mouth daily Yes Bethany Peña MD   SUMAtriptan (IMITREX) 100 MG tablet Take 1 tablet by mouth daily . May repeat in 2 hours if needed.   Do not exceed 2 tablets

## 2023-10-19 DIAGNOSIS — M54.42 CHRONIC MIDLINE LOW BACK PAIN WITH LEFT-SIDED SCIATICA: ICD-10-CM

## 2023-10-19 DIAGNOSIS — G89.29 CHRONIC MIDLINE LOW BACK PAIN WITH LEFT-SIDED SCIATICA: ICD-10-CM

## 2023-10-19 RX ORDER — CYCLOBENZAPRINE HCL 10 MG
10 TABLET ORAL 2 TIMES DAILY PRN
Qty: 60 TABLET | Refills: 1 | Status: SHIPPED | OUTPATIENT
Start: 2023-10-19

## 2023-10-19 NOTE — TELEPHONE ENCOUNTER
Marleni Gonzalez called to request a refill on her medication. Last office visit : 9/29/2023   Next office visit : 12/28/2023     Requested Prescriptions     Pending Prescriptions Disp Refills    cyclobenzaprine (FLEXERIL) 10 MG tablet [Pharmacy Med Name: CYCLOBENZAPRINE 10 MG TABLET] 60 tablet 1     Sig: TAKE 1 TABLET BY MOUTH 2 TIMES DAILY AS NEEDED FOR MUSCLE SPASMS.             William Watts LPN

## 2023-11-06 DIAGNOSIS — B35.1 ONYCHOMYCOSIS: ICD-10-CM

## 2023-11-06 RX ORDER — TERBINAFINE HYDROCHLORIDE 250 MG/1
250 TABLET ORAL DAILY
Qty: 84 TABLET | Refills: 0 | OUTPATIENT
Start: 2023-11-06

## 2023-11-06 NOTE — TELEPHONE ENCOUNTER
Jamaica Putnam called to request a refill on her medication.      Last office visit : 9/29/2023   Next office visit : 12/28/2023     Requested Prescriptions     Pending Prescriptions Disp Refills    terbinafine (LAMISIL) 250 MG tablet [Pharmacy Med Name: TERBINAFINE  MG TABLET] 84 tablet 0     Sig: TAKE 1 TABLET BY MOUTH EVERY DAY            Beatriz Garcia LPN

## 2023-11-27 ENCOUNTER — TELEPHONE (OUTPATIENT)
Dept: PRIMARY CARE CLINIC | Age: 38
End: 2023-11-27

## 2023-11-27 NOTE — TELEPHONE ENCOUNTER
Called patient and left voicemail for patient to call the office to reschedule appt on 12/28/2023 due to Dr Ric Douglas being out of the office that week.

## 2023-12-04 DIAGNOSIS — K21.9 GERD WITHOUT ESOPHAGITIS: ICD-10-CM

## 2023-12-04 DIAGNOSIS — G89.29 CHRONIC MIDLINE LOW BACK PAIN WITH LEFT-SIDED SCIATICA: ICD-10-CM

## 2023-12-04 DIAGNOSIS — M54.42 CHRONIC MIDLINE LOW BACK PAIN WITH LEFT-SIDED SCIATICA: ICD-10-CM

## 2023-12-04 RX ORDER — DICYCLOMINE HYDROCHLORIDE 10 MG/1
10 CAPSULE ORAL 3 TIMES DAILY PRN
Qty: 270 CAPSULE | Refills: 1 | Status: SHIPPED | OUTPATIENT
Start: 2023-12-04

## 2023-12-04 RX ORDER — SUMATRIPTAN 100 MG/1
100 TABLET, FILM COATED ORAL DAILY
Qty: 9 TABLET | Refills: 2 | Status: SHIPPED | OUTPATIENT
Start: 2023-12-04

## 2023-12-04 RX ORDER — OMEPRAZOLE 10 MG/1
10 CAPSULE, DELAYED RELEASE ORAL DAILY
Qty: 90 CAPSULE | Refills: 1 | Status: SHIPPED | OUTPATIENT
Start: 2023-12-04

## 2023-12-04 NOTE — TELEPHONE ENCOUNTER
Sy Collado called to request a refill on her medication. Last office visit : 9/29/2023   Next office visit : 12/28/2023     Requested Prescriptions     Pending Prescriptions Disp Refills    dicyclomine (BENTYL) 10 MG capsule 270 capsule 1     Sig: Take 1 capsule by mouth 3 times daily as needed (abdominal cramping)    omeprazole (PRILOSEC) 10 MG delayed release capsule 90 capsule 1     Sig: Take 1 capsule by mouth daily    SUMAtriptan (IMITREX) 100 MG tablet 9 tablet 2     Sig: Take 1 tablet by mouth daily . May repeat in 2 hours if needed. Do not exceed 2 tablets in 24 hours.             Sully Pedersen LPN

## 2024-01-27 DIAGNOSIS — M54.42 CHRONIC MIDLINE LOW BACK PAIN WITH LEFT-SIDED SCIATICA: ICD-10-CM

## 2024-01-27 DIAGNOSIS — G89.29 CHRONIC MIDLINE LOW BACK PAIN WITH LEFT-SIDED SCIATICA: ICD-10-CM

## 2024-01-30 RX ORDER — AMITRIPTYLINE HYDROCHLORIDE 10 MG/1
10 TABLET, FILM COATED ORAL NIGHTLY
Qty: 90 TABLET | Refills: 0 | Status: SHIPPED | OUTPATIENT
Start: 2024-01-30

## 2024-03-04 DIAGNOSIS — G89.29 CHRONIC MIDLINE LOW BACK PAIN WITH LEFT-SIDED SCIATICA: ICD-10-CM

## 2024-03-04 DIAGNOSIS — M54.42 CHRONIC MIDLINE LOW BACK PAIN WITH LEFT-SIDED SCIATICA: ICD-10-CM

## 2024-03-04 RX ORDER — DICLOFENAC SODIUM 75 MG/1
TABLET, DELAYED RELEASE ORAL
Qty: 60 TABLET | Refills: 2 | OUTPATIENT
Start: 2024-03-04

## 2024-03-04 RX ORDER — CYCLOBENZAPRINE HCL 10 MG
10 TABLET ORAL 2 TIMES DAILY PRN
Qty: 60 TABLET | Refills: 1 | OUTPATIENT
Start: 2024-03-04

## 2024-04-01 DIAGNOSIS — G89.29 CHRONIC MIDLINE LOW BACK PAIN WITH LEFT-SIDED SCIATICA: ICD-10-CM

## 2024-04-01 DIAGNOSIS — M54.42 CHRONIC MIDLINE LOW BACK PAIN WITH LEFT-SIDED SCIATICA: ICD-10-CM

## 2024-04-01 RX ORDER — CYCLOBENZAPRINE HCL 10 MG
10 TABLET ORAL 2 TIMES DAILY PRN
Qty: 60 TABLET | Refills: 1 | OUTPATIENT
Start: 2024-04-01

## 2024-04-03 DIAGNOSIS — G43.709 CHRONIC MIGRAINE WITHOUT AURA WITHOUT STATUS MIGRAINOSUS, NOT INTRACTABLE: ICD-10-CM

## 2024-04-03 DIAGNOSIS — M54.42 CHRONIC MIDLINE LOW BACK PAIN WITH LEFT-SIDED SCIATICA: ICD-10-CM

## 2024-04-03 DIAGNOSIS — I10 HYPERTENSION, UNSPECIFIED TYPE: ICD-10-CM

## 2024-04-03 DIAGNOSIS — G89.29 CHRONIC MIDLINE LOW BACK PAIN WITH LEFT-SIDED SCIATICA: ICD-10-CM

## 2024-04-03 RX ORDER — CYCLOBENZAPRINE HCL 10 MG
10 TABLET ORAL 2 TIMES DAILY PRN
Qty: 60 TABLET | Refills: 1 | Status: SHIPPED | OUTPATIENT
Start: 2024-04-03

## 2024-04-03 RX ORDER — DICLOFENAC SODIUM 75 MG/1
75 TABLET, DELAYED RELEASE ORAL 2 TIMES DAILY
Qty: 60 TABLET | Refills: 2 | Status: SHIPPED | OUTPATIENT
Start: 2024-04-03

## 2024-04-03 NOTE — TELEPHONE ENCOUNTER
Jamaica Putnam called to request a refill on her medication.      Last office visit : 9/29/2023   Next office visit : 6/4/2024     Requested Prescriptions     Pending Prescriptions Disp Refills    diclofenac (VOLTAREN) 75 MG EC tablet 60 tablet 2     Sig: Take 1 tablet by mouth 2 times daily    cyclobenzaprine (FLEXERIL) 10 MG tablet 60 tablet 1     Sig: Take 1 tablet by mouth 2 times daily as needed for Muscle spasms            Beatriz Garcia LPN

## 2024-04-03 NOTE — TELEPHONE ENCOUNTER
Jamaica Putnam called to request a refill on her medication.      Last office visit : 9/29/2023   Next office visit : 6/4/2024     Requested Prescriptions     Pending Prescriptions Disp Refills    metoprolol tartrate (LOPRESSOR) 25 MG tablet [Pharmacy Med Name: METOPROLOL TARTRATE 25 MG TAB] 180 tablet 1     Sig: TAKE 1 TABLET BY MOUTH TWICE A DAY            Beatriz Garcia LPN

## 2024-04-20 ENCOUNTER — PATIENT MESSAGE (OUTPATIENT)
Dept: PRIMARY CARE CLINIC | Age: 39
End: 2024-04-20

## 2024-04-24 NOTE — TELEPHONE ENCOUNTER
From: Jamaica Putnam  To: Dr. Bethany Peña  Sent: 4/20/2024 10:21 AM CDT  Subject: Diabetes & Labs     I am having some symptoms that I am concerned about being diabetes. My hands & feet are freezing 24/7. My hands & legs go numb all the time. I was wondering if I could get a lab test now, since my appointment isn’t until June. Can I get a full lab panel, including free t4-tsh, vitamin d, vitamin B and hemoglobin a-1c? In the past my vitamin B and D have been really low.

## 2024-05-04 DIAGNOSIS — G89.29 CHRONIC MIDLINE LOW BACK PAIN WITH LEFT-SIDED SCIATICA: ICD-10-CM

## 2024-05-04 DIAGNOSIS — M54.42 CHRONIC MIDLINE LOW BACK PAIN WITH LEFT-SIDED SCIATICA: ICD-10-CM

## 2024-05-06 RX ORDER — AMITRIPTYLINE HYDROCHLORIDE 10 MG/1
10 TABLET, FILM COATED ORAL NIGHTLY
Qty: 90 TABLET | Refills: 0 | Status: SHIPPED | OUTPATIENT
Start: 2024-05-06

## 2024-05-06 NOTE — TELEPHONE ENCOUNTER
Jamaica Putnam called to request a refill on her medication.      Last office visit : 9/29/2023   Next office visit : 6/4/2024     Requested Prescriptions     Pending Prescriptions Disp Refills    amitriptyline (ELAVIL) 10 MG tablet [Pharmacy Med Name: AMITRIPTYLINE HCL 10 MG TAB] 90 tablet 0     Sig: TAKE 1 TABLET BY MOUTH EVERY DAY AT NIGHT            Beatriz Garcia LPN

## 2024-06-04 ENCOUNTER — OFFICE VISIT (OUTPATIENT)
Dept: PRIMARY CARE CLINIC | Age: 39
End: 2024-06-04
Payer: COMMERCIAL

## 2024-06-04 VITALS
RESPIRATION RATE: 16 BRPM | SYSTOLIC BLOOD PRESSURE: 110 MMHG | BODY MASS INDEX: 39.25 KG/M2 | TEMPERATURE: 97.6 F | HEIGHT: 69 IN | DIASTOLIC BLOOD PRESSURE: 78 MMHG | WEIGHT: 265 LBS | HEART RATE: 77 BPM | OXYGEN SATURATION: 99 %

## 2024-06-04 DIAGNOSIS — Z13.0 SCREENING FOR DEFICIENCY ANEMIA: ICD-10-CM

## 2024-06-04 DIAGNOSIS — G89.29 CHRONIC MIDLINE LOW BACK PAIN WITH LEFT-SIDED SCIATICA: Primary | ICD-10-CM

## 2024-06-04 DIAGNOSIS — M54.42 CHRONIC MIDLINE LOW BACK PAIN WITH LEFT-SIDED SCIATICA: Primary | ICD-10-CM

## 2024-06-04 DIAGNOSIS — M19.90 GENERALIZED ARTHRITIS: ICD-10-CM

## 2024-06-04 DIAGNOSIS — K21.9 GERD WITHOUT ESOPHAGITIS: ICD-10-CM

## 2024-06-04 DIAGNOSIS — E78.2 MIXED HYPERLIPIDEMIA: ICD-10-CM

## 2024-06-04 DIAGNOSIS — F41.9 ANXIETY: ICD-10-CM

## 2024-06-04 DIAGNOSIS — I10 PRIMARY HYPERTENSION: ICD-10-CM

## 2024-06-04 DIAGNOSIS — J30.89 PERENNIAL ALLERGIC RHINITIS: ICD-10-CM

## 2024-06-04 PROCEDURE — 3078F DIAST BP <80 MM HG: CPT | Performed by: INTERNAL MEDICINE

## 2024-06-04 PROCEDURE — 3074F SYST BP LT 130 MM HG: CPT | Performed by: INTERNAL MEDICINE

## 2024-06-04 PROCEDURE — 99215 OFFICE O/P EST HI 40 MIN: CPT | Performed by: INTERNAL MEDICINE

## 2024-06-04 RX ORDER — CYCLOBENZAPRINE HCL 10 MG
10 TABLET ORAL 2 TIMES DAILY PRN
Qty: 180 TABLET | Refills: 1 | Status: SHIPPED | OUTPATIENT
Start: 2024-06-04 | End: 2024-12-01

## 2024-06-04 RX ORDER — DICYCLOMINE HYDROCHLORIDE 10 MG/1
10 CAPSULE ORAL 3 TIMES DAILY PRN
Qty: 270 CAPSULE | Refills: 1 | Status: SHIPPED | OUTPATIENT
Start: 2024-06-04

## 2024-06-04 RX ORDER — OMEPRAZOLE 10 MG/1
10 CAPSULE, DELAYED RELEASE ORAL DAILY
Qty: 90 CAPSULE | Refills: 1 | Status: SHIPPED | OUTPATIENT
Start: 2024-06-04

## 2024-06-04 RX ORDER — MONTELUKAST SODIUM 10 MG/1
10 TABLET ORAL NIGHTLY
Qty: 90 TABLET | Refills: 1 | Status: SHIPPED | OUTPATIENT
Start: 2024-06-04

## 2024-06-04 RX ORDER — OMEPRAZOLE 10 MG/1
10 CAPSULE, DELAYED RELEASE ORAL DAILY
Qty: 90 CAPSULE | Refills: 1 | Status: SHIPPED | OUTPATIENT
Start: 2024-06-04 | End: 2024-06-04 | Stop reason: SDUPTHER

## 2024-06-04 RX ORDER — CELECOXIB 200 MG/1
200 CAPSULE ORAL DAILY
Qty: 90 CAPSULE | Refills: 1 | Status: SHIPPED | OUTPATIENT
Start: 2024-06-04 | End: 2024-12-01

## 2024-06-04 RX ORDER — BUPROPION HYDROCHLORIDE 150 MG/1
TABLET ORAL
Qty: 30 TABLET | Refills: 5 | Status: SHIPPED | OUTPATIENT
Start: 2024-06-04

## 2024-06-04 ASSESSMENT — PATIENT HEALTH QUESTIONNAIRE - PHQ9
SUM OF ALL RESPONSES TO PHQ QUESTIONS 1-9: 0
1. LITTLE INTEREST OR PLEASURE IN DOING THINGS: NOT AT ALL
SUM OF ALL RESPONSES TO PHQ9 QUESTIONS 1 & 2: 0
SUM OF ALL RESPONSES TO PHQ QUESTIONS 1-9: 0
2. FEELING DOWN, DEPRESSED OR HOPELESS: NOT AT ALL
1. LITTLE INTEREST OR PLEASURE IN DOING THINGS: NOT AT ALL
SUM OF ALL RESPONSES TO PHQ9 QUESTIONS 1 & 2: 0
SUM OF ALL RESPONSES TO PHQ QUESTIONS 1-9: 0
2. FEELING DOWN, DEPRESSED OR HOPELESS: NOT AT ALL
SUM OF ALL RESPONSES TO PHQ QUESTIONS 1-9: 0

## 2024-06-04 NOTE — PROGRESS NOTES
rigidity.      Thoracic back: Normal.      Lumbar back: Tenderness present. Decreased range of motion.      Right knee: Crepitus present.      Left knee: Crepitus present.      Right lower leg: No edema.      Left lower leg: No edema.      Right ankle: Normal.      Left ankle: Normal.   Lymphadenopathy:      Cervical: No cervical adenopathy.   Skin:     General: Skin is warm and moist.      Findings: No lesion or rash.      Comments: hyperpigmentation on the forehead, around the lips   Neurological:      Mental Status: She is alert. Mental status is at baseline.      Cranial Nerves: No cranial nerve deficit.      Motor: Motor function is intact.      Coordination: Coordination is intact.      Gait: Gait is intact.      Deep Tendon Reflexes: Reflexes normal.   Psychiatric:         Attention and Perception: Attention normal.         Speech: Speech normal.         Behavior: Behavior normal.         Thought Content: Thought content normal.         Judgment: Judgment normal.     The face-to-face portion of this note for the coordination of care and the explanation and to assist patient in a healthy active lifestyle took over 30 minutes patient was really encouraged to make sure she makes a follow-up rather than a once every 2-year visit    @I continue to be the focal point for all needed health care services and/or with medical care services that are part of ongoing care related to Jamaica Putnam    (Time Documentation Optional [id number])    An electronic signaturewaas used to authenticate this note  -Bethany Peña MD on 6/5/2024 at 4:50 PM

## 2024-06-05 ASSESSMENT — ENCOUNTER SYMPTOMS
NAUSEA: 0
ABDOMINAL DISTENTION: 0
ABDOMINAL PAIN: 0
SINUS PAIN: 0
COLOR CHANGE: 1
SORE THROAT: 0
BLOOD IN STOOL: 0
COUGH: 0
VOMITING: 0
WHEEZING: 0
TROUBLE SWALLOWING: 0
APNEA: 0
RHINORRHEA: 0
CHEST TIGHTNESS: 0
BACK PAIN: 1
CONSTIPATION: 0
DIARRHEA: 0
SHORTNESS OF BREATH: 0

## 2024-06-05 NOTE — ASSESSMENT & PLAN NOTE
Well-controlled, continue current medications  Patient inquired if she had any chances of getting off blood pressure she has a family history blood pressure issues and at this time she will continue as she maintains well controlled

## 2024-06-05 NOTE — ASSESSMENT & PLAN NOTE
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

## 2024-06-07 ENCOUNTER — TELEPHONE (OUTPATIENT)
Dept: OBGYN CLINIC | Age: 39
End: 2024-06-07

## 2024-06-07 NOTE — TELEPHONE ENCOUNTER
Patient is requesting a return call from the office in regards to her appt today. Patient is scheduled for a physical, but has started her cycle today, she still wants to come in and discuss issues she is having. Please advise if patient can keep appt.

## 2024-06-07 NOTE — TELEPHONE ENCOUNTER
Called and spoke to pt. Pt stated she will call back when she gets back to her office to r/s appt.

## 2024-06-17 DIAGNOSIS — F41.9 ANXIETY: ICD-10-CM

## 2024-06-17 RX ORDER — BUPROPION HYDROCHLORIDE 150 MG/1
TABLET ORAL
Qty: 90 TABLET | Refills: 2 | OUTPATIENT
Start: 2024-06-17

## 2024-06-17 NOTE — TELEPHONE ENCOUNTER
Jamaica Putnam called to request a refill on her medication.      Last office visit : 6/4/2024   Next office visit : 12/5/2024     Requested Prescriptions     Pending Prescriptions Disp Refills    buPROPion (WELLBUTRIN XL) 150 MG extended release tablet [Pharmacy Med Name: BUPROPION HCL  MG TABLET] 90 tablet 2     Sig: TAKE 1 TABLET BY MOUTH EVERY DAY IN THE MORNING            Carole Monahan LPN

## 2024-07-04 DIAGNOSIS — I10 HYPERTENSION, UNSPECIFIED TYPE: ICD-10-CM

## 2024-07-04 DIAGNOSIS — G43.709 CHRONIC MIGRAINE WITHOUT AURA WITHOUT STATUS MIGRAINOSUS, NOT INTRACTABLE: ICD-10-CM

## 2024-07-05 NOTE — TELEPHONE ENCOUNTER
Jamaica Putnam called to request a refill on her medication.      Last office visit : 6/4/2024   Next office visit : 7/8/2024     Requested Prescriptions     Pending Prescriptions Disp Refills    metoprolol tartrate (LOPRESSOR) 25 MG tablet [Pharmacy Med Name: METOPROLOL TARTRATE 25 MG TAB] 180 tablet 0     Sig: TAKE 1 TABLET BY MOUTH TWICE A DAY            Beatriz Garcia LPN

## 2024-07-07 NOTE — PROGRESS NOTES
7/8/2024 at 6:42 PM    An electronic signature was used to authenticate this note.       Total time spent for this encounter:  20    --Bethany Peña MD on 7/8/2024 at 6:42 PM    An electronic signature was used to authenticate this note.       --Bethany Peña MD on 7/8/2024 at 6:42 PM    An electronic signature was used to authenticate this note.

## 2024-07-08 ENCOUNTER — TELEMEDICINE (OUTPATIENT)
Dept: PRIMARY CARE CLINIC | Age: 39
End: 2024-07-08
Payer: COMMERCIAL

## 2024-07-08 DIAGNOSIS — F41.9 ANXIETY: ICD-10-CM

## 2024-07-08 PROCEDURE — 99442 PR PHYS/QHP TELEPHONE EVALUATION 11-20 MIN: CPT | Performed by: INTERNAL MEDICINE

## 2024-07-08 RX ORDER — BUPROPION HYDROCHLORIDE 300 MG/1
300 TABLET ORAL EVERY MORNING
Qty: 90 TABLET | Refills: 1 | Status: SHIPPED | OUTPATIENT
Start: 2024-07-08 | End: 2025-01-04

## 2024-07-08 SDOH — ECONOMIC STABILITY: FOOD INSECURITY: WITHIN THE PAST 12 MONTHS, THE FOOD YOU BOUGHT JUST DIDN'T LAST AND YOU DIDN'T HAVE MONEY TO GET MORE.: NEVER TRUE

## 2024-07-08 SDOH — ECONOMIC STABILITY: FOOD INSECURITY: WITHIN THE PAST 12 MONTHS, YOU WORRIED THAT YOUR FOOD WOULD RUN OUT BEFORE YOU GOT MONEY TO BUY MORE.: NEVER TRUE

## 2024-07-08 SDOH — ECONOMIC STABILITY: INCOME INSECURITY: HOW HARD IS IT FOR YOU TO PAY FOR THE VERY BASICS LIKE FOOD, HOUSING, MEDICAL CARE, AND HEATING?: NOT HARD AT ALL

## 2024-07-08 ASSESSMENT — ANXIETY QUESTIONNAIRES
7. FEELING AFRAID AS IF SOMETHING AWFUL MIGHT HAPPEN: NOT AT ALL
IF YOU CHECKED OFF ANY PROBLEMS ON THIS QUESTIONNAIRE, HOW DIFFICULT HAVE THESE PROBLEMS MADE IT FOR YOU TO DO YOUR WORK, TAKE CARE OF THINGS AT HOME, OR GET ALONG WITH OTHER PEOPLE: SOMEWHAT DIFFICULT
GAD7 TOTAL SCORE: 15
2. NOT BEING ABLE TO STOP OR CONTROL WORRYING: NEARLY EVERY DAY
4. TROUBLE RELAXING: NEARLY EVERY DAY
1. FEELING NERVOUS, ANXIOUS, OR ON EDGE: NEARLY EVERY DAY
3. WORRYING TOO MUCH ABOUT DIFFERENT THINGS: NEARLY EVERY DAY
6. BECOMING EASILY ANNOYED OR IRRITABLE: NOT AT ALL
5. BEING SO RESTLESS THAT IT IS HARD TO SIT STILL: NEARLY EVERY DAY

## 2024-07-08 NOTE — ASSESSMENT & PLAN NOTE
Uncontrolled, changes made today: Patient was informed that most SSRIs are have a common side effect of sexual dysfunction and weight gain switching her back to Lexapro would just cause her to have both over time she would probably asked to discontinue again I did ask if she was willing to stay on Wellbutrin at the higher dose to try to control her anxiety symptoms she was willing to do this and therefore today I increase her Wellbutrin from 1  daily she will check in with me in 2 weeks to address her symptoms and we may increase to 450 if need be

## 2024-07-29 ENCOUNTER — OFFICE VISIT (OUTPATIENT)
Dept: OBGYN CLINIC | Age: 39
End: 2024-07-29
Payer: COMMERCIAL

## 2024-07-29 VITALS
HEIGHT: 69 IN | BODY MASS INDEX: 38.73 KG/M2 | HEART RATE: 72 BPM | DIASTOLIC BLOOD PRESSURE: 75 MMHG | WEIGHT: 261.5 LBS | SYSTOLIC BLOOD PRESSURE: 102 MMHG

## 2024-07-29 DIAGNOSIS — N92.6 IRREGULAR PERIODS: ICD-10-CM

## 2024-07-29 DIAGNOSIS — Z31.9 INFERTILITY MANAGEMENT: ICD-10-CM

## 2024-07-29 DIAGNOSIS — E66.9 OBESITY (BMI 35.0-39.9 WITHOUT COMORBIDITY): ICD-10-CM

## 2024-07-29 DIAGNOSIS — Z12.4 SCREENING FOR CERVICAL CANCER: ICD-10-CM

## 2024-07-29 DIAGNOSIS — R39.198 DIFFICULTY URINATING: ICD-10-CM

## 2024-07-29 DIAGNOSIS — Z01.419 WELL WOMAN EXAM WITH ROUTINE GYNECOLOGICAL EXAM: Primary | ICD-10-CM

## 2024-07-29 DIAGNOSIS — Z11.51 SCREENING FOR HPV (HUMAN PAPILLOMAVIRUS): ICD-10-CM

## 2024-07-29 LAB
BILIRUB UR QL STRIP: NEGATIVE
CLARITY UR: CLEAR
COLOR UR: YELLOW
GLUCOSE UR STRIP.AUTO-MCNC: NEGATIVE MG/DL
HGB UR STRIP.AUTO-MCNC: NEGATIVE MG/L
KETONES UR STRIP.AUTO-MCNC: NEGATIVE MG/DL
LEUKOCYTE ESTERASE UR QL STRIP.AUTO: NEGATIVE
NITRITE UR QL STRIP.AUTO: NEGATIVE
PH UR STRIP.AUTO: 7 [PH] (ref 5–8)
PROT UR STRIP.AUTO-MCNC: NEGATIVE MG/DL
SP GR UR STRIP.AUTO: 1 (ref 1–1.03)
UROBILINOGEN UR STRIP.AUTO-MCNC: 0.2 E.U./DL

## 2024-07-29 PROCEDURE — 99395 PREV VISIT EST AGE 18-39: CPT | Performed by: NURSE PRACTITIONER

## 2024-07-29 PROCEDURE — 3078F DIAST BP <80 MM HG: CPT | Performed by: NURSE PRACTITIONER

## 2024-07-29 PROCEDURE — 3074F SYST BP LT 130 MM HG: CPT | Performed by: NURSE PRACTITIONER

## 2024-07-29 PROCEDURE — 99214 OFFICE O/P EST MOD 30 MIN: CPT | Performed by: NURSE PRACTITIONER

## 2024-07-29 ASSESSMENT — ENCOUNTER SYMPTOMS
RESPIRATORY NEGATIVE: 1
ALLERGIC/IMMUNOLOGIC NEGATIVE: 1
GASTROINTESTINAL NEGATIVE: 1
EYES NEGATIVE: 1

## 2024-07-29 ASSESSMENT — PATIENT HEALTH QUESTIONNAIRE - PHQ9
SUM OF ALL RESPONSES TO PHQ QUESTIONS 1-9: 0
SUM OF ALL RESPONSES TO PHQ9 QUESTIONS 1 & 2: 0
2. FEELING DOWN, DEPRESSED OR HOPELESS: NOT AT ALL
SUM OF ALL RESPONSES TO PHQ QUESTIONS 1-9: 0
1. LITTLE INTEREST OR PLEASURE IN DOING THINGS: NOT AT ALL
SUM OF ALL RESPONSES TO PHQ QUESTIONS 1-9: 0
SUM OF ALL RESPONSES TO PHQ QUESTIONS 1-9: 0

## 2024-07-29 NOTE — PROGRESS NOTES
Jamaica Putnam is a 38 y.o. female who presents today for her medical conditions/ complaints as noted below. [place] Putnam is c/o of Annual Exam        HPI  Pt presents today for pap smear and breast exam. Pt would like urine ran for UTI; states she feels like she hasn't been peeing as much. Pt states she has been TTC for a year with no luck; pt would like to discuss this as well.   Periods irregular, we looked at her merrick together, some months, 6 weeks apart, others bleeding for 10 days.   Is  now, her  has never gotten anyone pregnant. Active, walks a postal route.   Lost weight on Saxenda, then couldn't get it anymore. Admits hasn't been watching diet anymore      Last mammogram:  N/A  Last pap smear: 23   Contraception:    :  0  Para:  0  AB:  0  Last bone density: N/A   Last colonoscopy: N/A    Patient's last menstrual period was 2024 (exact date).      Past Medical History:   Diagnosis Date    Anxiety 2005    Chronic back pain     GERD (gastroesophageal reflux disease)     Hyperlipidemia     Hypertension     IBS (irritable bowel syndrome)     Migraines     Restless legs syndrome      Past Surgical History:   Procedure Laterality Date    KNEE ARTHROSCOPY Left     UPPER GASTROINTESTINAL ENDOSCOPY      WISDOM TOOTH EXTRACTION       Family History   Problem Relation Age of Onset    Migraines Mother     Hypertension Father     Elevated Lipids Father     High Blood Pressure Father     High Cholesterol Father     Lung Cancer Maternal Grandfather     Migraines Paternal Grandmother     Cancer Paternal Grandfather         mouth     Social History     Tobacco Use    Smoking status: Never    Smokeless tobacco: Never   Substance Use Topics    Alcohol use: Yes     Comment: occasion       Current Outpatient Medications   Medication Sig Dispense Refill    metFORMIN (GLUCOPHAGE) 500 MG tablet Take 1 tablet by mouth 3 times daily (with meals) Gradually increase to three

## 2024-07-31 LAB — BACTERIA UR CULT: NORMAL

## 2024-08-04 DIAGNOSIS — Z31.9 INFERTILITY MANAGEMENT: ICD-10-CM

## 2024-08-07 DIAGNOSIS — G89.29 CHRONIC MIDLINE LOW BACK PAIN WITH LEFT-SIDED SCIATICA: ICD-10-CM

## 2024-08-07 DIAGNOSIS — M54.42 CHRONIC MIDLINE LOW BACK PAIN WITH LEFT-SIDED SCIATICA: ICD-10-CM

## 2024-08-07 RX ORDER — AMITRIPTYLINE HYDROCHLORIDE 10 MG/1
10 TABLET, FILM COATED ORAL NIGHTLY
Qty: 90 TABLET | Refills: 0 | Status: SHIPPED | OUTPATIENT
Start: 2024-08-07

## 2024-08-07 NOTE — TELEPHONE ENCOUNTER
Jamaica Putnam called to request a refill on her medication.      Last office visit : 7/8/2024   Next office visit : 12/5/2024     Requested Prescriptions     Pending Prescriptions Disp Refills    amitriptyline (ELAVIL) 10 MG tablet [Pharmacy Med Name: AMITRIPTYLINE HCL 10 MG TAB] 90 tablet 0     Sig: TAKE 1 TABLET BY MOUTH EVERY DAY AT NIGHT            Beatriz Garcia LPN

## 2024-08-08 DIAGNOSIS — Z13.0 SCREENING FOR DEFICIENCY ANEMIA: ICD-10-CM

## 2024-08-08 DIAGNOSIS — K21.9 GERD WITHOUT ESOPHAGITIS: ICD-10-CM

## 2024-08-08 DIAGNOSIS — M19.90 GENERALIZED ARTHRITIS: ICD-10-CM

## 2024-08-08 DIAGNOSIS — E78.2 MIXED HYPERLIPIDEMIA: ICD-10-CM

## 2024-08-08 DIAGNOSIS — N92.6 IRREGULAR PERIODS: ICD-10-CM

## 2024-08-08 LAB
ALBUMIN SERPL-MCNC: 4.4 G/DL (ref 3.5–5.2)
ALP SERPL-CCNC: 89 U/L (ref 35–104)
ALT SERPL-CCNC: 16 U/L (ref 5–33)
ANION GAP SERPL CALCULATED.3IONS-SCNC: 13 MMOL/L (ref 7–19)
AST SERPL-CCNC: 18 U/L (ref 5–32)
BASOPHILS # BLD: 0.1 K/UL (ref 0–0.2)
BASOPHILS NFR BLD: 0.9 % (ref 0–1)
BILIRUB SERPL-MCNC: 0.3 MG/DL (ref 0.2–1.2)
BUN SERPL-MCNC: 5 MG/DL (ref 6–20)
CALCIUM SERPL-MCNC: 9.1 MG/DL (ref 8.6–10)
CHLORIDE SERPL-SCNC: 101 MMOL/L (ref 98–111)
CHOLEST SERPL-MCNC: 245 MG/DL (ref 160–199)
CO2 SERPL-SCNC: 24 MMOL/L (ref 22–29)
CREAT SERPL-MCNC: 0.6 MG/DL (ref 0.5–0.9)
CRP SERPL HS-MCNC: 1.17 MG/DL (ref 0–0.5)
EOSINOPHIL # BLD: 0.3 K/UL (ref 0–0.6)
EOSINOPHIL NFR BLD: 4.2 % (ref 0–5)
ERYTHROCYTE [DISTWIDTH] IN BLOOD BY AUTOMATED COUNT: 12.3 % (ref 11.5–14.5)
ERYTHROCYTE [SEDIMENTATION RATE] IN BLOOD BY WESTERGREN METHOD: 24 MM/HR (ref 0–20)
GLUCOSE SERPL-MCNC: 98 MG/DL (ref 74–109)
HCT VFR BLD AUTO: 40.5 % (ref 37–47)
HDLC SERPL-MCNC: 40 MG/DL (ref 65–121)
HGB BLD-MCNC: 12.9 G/DL (ref 12–16)
IMM GRANULOCYTES # BLD: 0 K/UL
LDLC SERPL CALC-MCNC: 152 MG/DL
LYMPHOCYTES # BLD: 2.4 K/UL (ref 1.1–4.5)
LYMPHOCYTES NFR BLD: 36.8 % (ref 20–40)
MCH RBC QN AUTO: 28.7 PG (ref 27–31)
MCHC RBC AUTO-ENTMCNC: 31.9 G/DL (ref 33–37)
MCV RBC AUTO: 90 FL (ref 81–99)
MONOCYTES # BLD: 0.3 K/UL (ref 0–0.9)
MONOCYTES NFR BLD: 4.8 % (ref 0–10)
NEUTROPHILS # BLD: 3.4 K/UL (ref 1.5–7.5)
NEUTS SEG NFR BLD: 52.8 % (ref 50–65)
PLATELET # BLD AUTO: 264 K/UL (ref 130–400)
PMV BLD AUTO: 10.2 FL (ref 9.4–12.3)
POTASSIUM SERPL-SCNC: 4.3 MMOL/L (ref 3.5–5)
PROT SERPL-MCNC: 7.8 G/DL (ref 6.6–8.7)
RBC # BLD AUTO: 4.5 M/UL (ref 4.2–5.4)
SODIUM SERPL-SCNC: 138 MMOL/L (ref 136–145)
T4 FREE SERPL-MCNC: 0.98 NG/DL (ref 0.93–1.7)
TRIGL SERPL-MCNC: 266 MG/DL (ref 0–149)
TSH SERPL DL<=0.005 MIU/L-ACNC: 3.02 UIU/ML (ref 0.27–4.2)
WBC # BLD AUTO: 6.4 K/UL (ref 4.8–10.8)

## 2024-08-09 LAB — CCP IGA+IGG SERPL IA-ACNC: 6 UNITS (ref 0–19)

## 2024-09-12 ENCOUNTER — TELEPHONE (OUTPATIENT)
Dept: OBGYN CLINIC | Age: 39
End: 2024-09-12

## 2024-09-28 ENCOUNTER — PATIENT MESSAGE (OUTPATIENT)
Dept: PRIMARY CARE CLINIC | Age: 39
End: 2024-09-28

## 2024-10-13 DIAGNOSIS — G43.709 CHRONIC MIGRAINE WITHOUT AURA WITHOUT STATUS MIGRAINOSUS, NOT INTRACTABLE: ICD-10-CM

## 2024-10-13 DIAGNOSIS — I10 HYPERTENSION, UNSPECIFIED TYPE: ICD-10-CM

## 2024-10-14 RX ORDER — METOPROLOL TARTRATE 25 MG/1
25 TABLET, FILM COATED ORAL 2 TIMES DAILY
Qty: 180 TABLET | Refills: 0 | Status: SHIPPED | OUTPATIENT
Start: 2024-10-14

## 2024-10-14 NOTE — TELEPHONE ENCOUNTER
Jamaica Putnam called to request a refill on her medication.      Last office visit : 7/8/2024   Next office visit : 12/5/2024     Requested Prescriptions     Pending Prescriptions Disp Refills    metoprolol tartrate (LOPRESSOR) 25 MG tablet [Pharmacy Med Name: METOPROLOL TARTRATE 25 MG TAB] 180 tablet 0     Sig: TAKE 1 TABLET BY MOUTH TWICE A DAY            Beatriz Garcia LPN

## 2024-11-10 DIAGNOSIS — M54.42 CHRONIC MIDLINE LOW BACK PAIN WITH LEFT-SIDED SCIATICA: ICD-10-CM

## 2024-11-10 DIAGNOSIS — G89.29 CHRONIC MIDLINE LOW BACK PAIN WITH LEFT-SIDED SCIATICA: ICD-10-CM

## 2024-11-11 RX ORDER — AMITRIPTYLINE HYDROCHLORIDE 10 MG/1
10 TABLET ORAL NIGHTLY
Qty: 90 TABLET | Refills: 0 | Status: SHIPPED | OUTPATIENT
Start: 2024-11-11

## 2024-11-21 DIAGNOSIS — M19.90 GENERALIZED ARTHRITIS: Primary | ICD-10-CM

## 2024-11-21 RX ORDER — DICLOFENAC SODIUM 75 MG/1
75 TABLET, DELAYED RELEASE ORAL 2 TIMES DAILY
Qty: 60 TABLET | Refills: 2 | Status: SHIPPED | OUTPATIENT
Start: 2024-11-21

## 2024-11-21 NOTE — TELEPHONE ENCOUNTER
Jamaica Putnam called to request a refill on her medication.      Last office visit : 7/8/2024   Next office visit : 12/5/2024     Requested Prescriptions     Pending Prescriptions Disp Refills    diclofenac (VOLTAREN) 75 MG EC tablet [Pharmacy Med Name: DICLOFENAC SOD EC 75 MG TAB] 60 tablet 2     Sig: TAKE 1 TABLET BY MOUTH TWICE A DAY            Beatriz Garcia LPN

## 2024-12-04 PROBLEM — M51.26 HERNIATED NUCLEUS PULPOSUS, LUMBAR: Status: ACTIVE | Noted: 2022-06-29

## 2024-12-30 ENCOUNTER — OFFICE VISIT (OUTPATIENT)
Dept: PRIMARY CARE CLINIC | Age: 39
End: 2024-12-30
Payer: COMMERCIAL

## 2024-12-30 VITALS
BODY MASS INDEX: 37.59 KG/M2 | HEIGHT: 69 IN | SYSTOLIC BLOOD PRESSURE: 124 MMHG | HEART RATE: 71 BPM | OXYGEN SATURATION: 99 % | DIASTOLIC BLOOD PRESSURE: 80 MMHG | TEMPERATURE: 97.2 F | WEIGHT: 253.8 LBS

## 2024-12-30 DIAGNOSIS — E66.9 OBESITY (BMI 35.0-39.9 WITHOUT COMORBIDITY): Primary | ICD-10-CM

## 2024-12-30 DIAGNOSIS — R13.10 DYSPHAGIA, UNSPECIFIED TYPE: ICD-10-CM

## 2024-12-30 DIAGNOSIS — R11.0 NAUSEA: ICD-10-CM

## 2024-12-30 PROCEDURE — 99214 OFFICE O/P EST MOD 30 MIN: CPT | Performed by: NURSE PRACTITIONER

## 2024-12-30 PROCEDURE — 3079F DIAST BP 80-89 MM HG: CPT | Performed by: NURSE PRACTITIONER

## 2024-12-30 PROCEDURE — 3074F SYST BP LT 130 MM HG: CPT | Performed by: NURSE PRACTITIONER

## 2024-12-30 RX ORDER — TIRZEPATIDE 2.5 MG/.5ML
2.5 INJECTION, SOLUTION SUBCUTANEOUS WEEKLY
Qty: 2 ML | Refills: 0 | Status: SHIPPED | OUTPATIENT
Start: 2024-12-30

## 2024-12-30 RX ORDER — ONDANSETRON 4 MG/1
4 TABLET, FILM COATED ORAL 3 TIMES DAILY PRN
Qty: 30 TABLET | Refills: 0 | Status: SHIPPED | OUTPATIENT
Start: 2024-12-30

## 2024-12-30 ASSESSMENT — ENCOUNTER SYMPTOMS
SHORTNESS OF BREATH: 0
DIARRHEA: 0
SORE THROAT: 0
COLOR CHANGE: 0
ABDOMINAL PAIN: 0
NAUSEA: 0
CHEST TIGHTNESS: 0
COUGH: 0
VOMITING: 0

## 2024-12-30 NOTE — PROGRESS NOTES
Ms.Breanne SAKINA Putnam is a 39 y.o. female who presents today for  Chief Complaint   Patient presents with    Weight Loss       HPI:  Patient here today to discuss weight loss. She states she has treid Saxenda in the past, but was unable to tolerate due to associated nausea, heart burn, and diarrhea. Her Mother has taken Zepbound, and has been successful. .Patient also states she has tried consistent diet and exercise without desired results.     Patient notes concern for healing insect bite on the right cheek, as well and dysphagia. Patient states that she has seen gastroenterology in the past, but continues to choke frequently and oftentimes has a sensation of food stuck in her throat.     Review of Systems   Constitutional:  Negative for activity change and fever.   HENT:  Negative for congestion, ear pain and sore throat.    Respiratory:  Negative for cough, chest tightness and shortness of breath.    Cardiovascular:  Negative for chest pain.   Gastrointestinal:  Negative for abdominal pain, diarrhea, nausea and vomiting.        Difficulty swallowing    Genitourinary:  Negative for frequency and urgency.   Musculoskeletal:  Negative for arthralgias and myalgias.   Skin:  Negative for color change.   Neurological:  Negative for dizziness, weakness and numbness.   Psychiatric/Behavioral:  Negative for agitation. The patient is not nervous/anxious.        Past Medical History:   Diagnosis Date    Anxiety 2005    Chronic back pain 2015    GERD (gastroesophageal reflux disease) 2005    Hyperlipidemia     Hypertension     IBS (irritable bowel syndrome)     Migraines     Restless legs syndrome 2020       Current Outpatient Medications   Medication Sig Dispense Refill    tirzepatide-weight management (ZEPBOUND) 2.5 MG/0.5ML SOAJ subCUTAneous auto-injector pen Inject 2.5 mg into the skin once a week 2 mL 0    ondansetron (ZOFRAN) 4 MG tablet Take 1 tablet by mouth 3 times daily as needed for Nausea or Vomiting 30 tablet 0

## 2025-01-04 ENCOUNTER — OFFICE VISIT (OUTPATIENT)
Age: 40
End: 2025-01-04

## 2025-01-04 VITALS
DIASTOLIC BLOOD PRESSURE: 72 MMHG | WEIGHT: 259 LBS | OXYGEN SATURATION: 100 % | SYSTOLIC BLOOD PRESSURE: 128 MMHG | HEART RATE: 86 BPM | BODY MASS INDEX: 38.25 KG/M2 | TEMPERATURE: 98.1 F | RESPIRATION RATE: 20 BRPM

## 2025-01-04 DIAGNOSIS — R05.1 ACUTE COUGH: ICD-10-CM

## 2025-01-04 DIAGNOSIS — Z11.59 SCREENING FOR VIRAL DISEASE: ICD-10-CM

## 2025-01-04 DIAGNOSIS — R09.89 CHEST CONGESTION: ICD-10-CM

## 2025-01-04 DIAGNOSIS — J06.9 UPPER RESPIRATORY INFECTION WITH COUGH AND CONGESTION: Primary | ICD-10-CM

## 2025-01-04 DIAGNOSIS — J02.9 SORE THROAT: ICD-10-CM

## 2025-01-04 LAB
INFLUENZA A ANTIBODY: NEGATIVE
INFLUENZA B ANTIBODY: NEGATIVE
Lab: NORMAL
QC PASS/FAIL: NORMAL
S PYO AG THROAT QL: NORMAL
SARS-COV-2, POC: NORMAL

## 2025-01-04 RX ORDER — BENZONATATE 100 MG/1
100-200 CAPSULE ORAL 3 TIMES DAILY PRN
Qty: 42 CAPSULE | Refills: 0 | Status: SHIPPED | OUTPATIENT
Start: 2025-01-04 | End: 2025-01-11

## 2025-01-04 RX ORDER — LORATADINE PSEUDOEPHEDRINE SULFATE 10; 240 MG/1; MG/1
1 TABLET, EXTENDED RELEASE ORAL DAILY
Qty: 30 TABLET | Refills: 0 | Status: SHIPPED | OUTPATIENT
Start: 2025-01-04 | End: 2025-02-03

## 2025-01-04 RX ORDER — FLUTICASONE PROPIONATE 50 MCG
2 SPRAY, SUSPENSION (ML) NASAL DAILY
Qty: 48 G | Refills: 1 | Status: SHIPPED | OUTPATIENT
Start: 2025-01-04

## 2025-01-04 ASSESSMENT — ENCOUNTER SYMPTOMS
CHEST TIGHTNESS: 0
SINUS PAIN: 0
TROUBLE SWALLOWING: 0
APNEA: 0
COLOR CHANGE: 0
WHEEZING: 0
ABDOMINAL DISTENTION: 0
SORE THROAT: 0
VOMITING: 0
SINUS PRESSURE: 0
EYE DISCHARGE: 0
RHINORRHEA: 1
CONSTIPATION: 0
EYE PAIN: 0
EYE ITCHING: 0
COUGH: 1
DIARRHEA: 0
SHORTNESS OF BREATH: 0
ABDOMINAL PAIN: 0
NAUSEA: 0

## 2025-01-04 NOTE — PATIENT INSTRUCTIONS
- Negative COVID, Flu, and Strep test.  - Medications such as Zyrtec or Claritin may help with symptoms.   - Nasal decongestant, such as Flonase/Afrin as needed.  - OTC cough medicine like Delsym/Robitussin if applicable.  - Tylenol/Motrin as needed for body aches/fevers unless contraindicated.  - Multivitamin is recommended to help boost the immune system.  - Drink plenty of water to stay hydrated.  - May use humidifier as needed while sleeping.  - Allow adequate rest.  - Encourage deep breathing exercises.  - Recommended staying home until fever free for at least 24 hours without medication and symptoms are improving.  - Return to the clinic or follow up with PCP if symptoms worsen or fail to improve.

## 2025-01-04 NOTE — PROGRESS NOTES
Hepatitis C screen  Completed    HIV screen  Completed    Hepatitis A vaccine  Aged Out    Hib vaccine  Aged Out    HPV vaccine  Aged Out    Polio vaccine  Aged Out    Meningococcal (ACWY) vaccine  Aged Out    Pneumococcal 0-64 years Vaccine  Aged Out    Varicella vaccine  Discontinued       Subjective:     Review of Systems   Constitutional:  Negative for activity change, appetite change, chills, diaphoresis, fatigue, fever and unexpected weight change.   HENT:  Positive for congestion, ear pain (fullness) and rhinorrhea. Negative for ear discharge, sinus pressure, sinus pain, sneezing, sore throat and trouble swallowing.    Eyes:  Negative for pain, discharge, itching and visual disturbance.   Respiratory:  Positive for cough. Negative for apnea, chest tightness, shortness of breath and wheezing.    Cardiovascular:  Negative for chest pain, palpitations and leg swelling.   Gastrointestinal:  Negative for abdominal distention, abdominal pain, constipation, diarrhea, nausea and vomiting.   Endocrine: Negative for polydipsia, polyphagia and polyuria.   Genitourinary:  Negative for decreased urine volume, difficulty urinating, dysuria, flank pain, frequency, hematuria and urgency.   Musculoskeletal:  Positive for myalgias. Negative for arthralgias and joint swelling.   Skin:  Negative for color change and rash.   Allergic/Immunologic: Negative for immunocompromised state.   Neurological:  Positive for headaches. Negative for dizziness, syncope, speech difficulty, weakness, light-headedness and numbness.   Hematological:  Does not bruise/bleed easily.   Psychiatric/Behavioral:  Negative for agitation, confusion and decreased concentration.        :Objective      Physical Exam  Vitals reviewed.   Constitutional:       General: She is not in acute distress.     Appearance: Normal appearance. She is not ill-appearing or toxic-appearing.   HENT:      Head: Normocephalic.      Right Ear: Ear canal and external ear normal.

## 2025-01-14 ENCOUNTER — PATIENT MESSAGE (OUTPATIENT)
Dept: PRIMARY CARE CLINIC | Age: 40
End: 2025-01-14

## 2025-01-19 DIAGNOSIS — G43.709 CHRONIC MIGRAINE WITHOUT AURA WITHOUT STATUS MIGRAINOSUS, NOT INTRACTABLE: ICD-10-CM

## 2025-01-19 DIAGNOSIS — Z31.9 INFERTILITY MANAGEMENT: ICD-10-CM

## 2025-01-19 DIAGNOSIS — I10 HYPERTENSION, UNSPECIFIED TYPE: ICD-10-CM

## 2025-01-20 ENCOUNTER — TELEPHONE (OUTPATIENT)
Dept: OBGYN CLINIC | Age: 40
End: 2025-01-20

## 2025-01-20 RX ORDER — METOPROLOL TARTRATE 25 MG/1
25 TABLET, FILM COATED ORAL 2 TIMES DAILY
Qty: 180 TABLET | Refills: 1 | Status: SHIPPED | OUTPATIENT
Start: 2025-01-20

## 2025-01-20 NOTE — TELEPHONE ENCOUNTER
Jamaica SAKINA Putnam called to request a refill on her medication.      Last office visit : 12/30/2024   Next office visit : 1/27/2025     Requested Prescriptions     Signed Prescriptions Disp Refills    metoprolol tartrate (LOPRESSOR) 25 MG tablet 180 tablet 1     Sig: TAKE 1 TABLET BY MOUTH TWICE A DAY     Authorizing Provider: HUYEN CARREON     Ordering User: SHO KERR LPN

## 2025-02-12 DIAGNOSIS — M54.42 CHRONIC MIDLINE LOW BACK PAIN WITH LEFT-SIDED SCIATICA: ICD-10-CM

## 2025-02-12 DIAGNOSIS — G89.29 CHRONIC MIDLINE LOW BACK PAIN WITH LEFT-SIDED SCIATICA: ICD-10-CM

## 2025-02-12 RX ORDER — AMITRIPTYLINE HYDROCHLORIDE 10 MG/1
10 TABLET ORAL NIGHTLY
Qty: 90 TABLET | Refills: 0 | Status: SHIPPED | OUTPATIENT
Start: 2025-02-12

## 2025-02-12 NOTE — TELEPHONE ENCOUNTER
Jamaica SAKINA Putnam called to request a refill on her medication.      Last office visit : 12/30/2024   Next office visit : 2/20/2025     Requested Prescriptions     Signed Prescriptions Disp Refills    amitriptyline (ELAVIL) 10 MG tablet 90 tablet 0     Sig: TAKE 1 TABLET BY MOUTH EVERY DAY AT NIGHT     Authorizing Provider: HUYEN CARREON     Ordering User: SHO KERR LPN

## 2025-02-23 DIAGNOSIS — J30.89 PERENNIAL ALLERGIC RHINITIS: ICD-10-CM

## 2025-02-23 DIAGNOSIS — F41.9 ANXIETY: ICD-10-CM

## 2025-02-23 DIAGNOSIS — M19.90 GENERALIZED ARTHRITIS: ICD-10-CM

## 2025-02-23 DIAGNOSIS — G89.29 CHRONIC MIDLINE LOW BACK PAIN WITH LEFT-SIDED SCIATICA: ICD-10-CM

## 2025-02-23 DIAGNOSIS — M54.42 CHRONIC MIDLINE LOW BACK PAIN WITH LEFT-SIDED SCIATICA: ICD-10-CM

## 2025-02-24 RX ORDER — MONTELUKAST SODIUM 10 MG/1
10 TABLET ORAL NIGHTLY
Qty: 90 TABLET | Refills: 1 | Status: SHIPPED | OUTPATIENT
Start: 2025-02-24

## 2025-02-24 RX ORDER — BUPROPION HYDROCHLORIDE 300 MG/1
300 TABLET ORAL EVERY MORNING
Qty: 90 TABLET | Refills: 1 | Status: SHIPPED | OUTPATIENT
Start: 2025-02-24

## 2025-02-24 RX ORDER — CELECOXIB 200 MG/1
CAPSULE ORAL DAILY
Qty: 90 CAPSULE | Refills: 1 | OUTPATIENT
Start: 2025-02-24

## 2025-02-24 NOTE — TELEPHONE ENCOUNTER
Jamaica SAKINA Putnam called to request a refill on her medication.      Last office visit : 12/30/2024   Next office visit : Visit date not found     Requested Prescriptions     Signed Prescriptions Disp Refills    montelukast (SINGULAIR) 10 MG tablet 90 tablet 1     Sig: TAKE 1 TABLET BY MOUTH EVERY DAY AT NIGHT     Authorizing Provider: HUYEN CARREON     Ordering User: SHO KERR    buPROPion (WELLBUTRIN XL) 300 MG extended release tablet 90 tablet 1     Sig: TAKE 1 TABLET BY MOUTH EVERY DAY IN THE MORNING     Authorizing Provider: HUYEN CARREON     Ordering User: SHO KERR     Refused Prescriptions Disp Refills    celecoxib (CELEBREX) 200 MG capsule [Pharmacy Med Name: CELECOXIB 200 MG CAPSULE] 90 capsule 1     Sig: TAKE 1 CAPSULE BY MOUTH EVERY DAY     Refused By: SHO KERR     Reason for Refusal: Medication discontinued            Sho Kerr LPN

## 2025-02-24 NOTE — TELEPHONE ENCOUNTER
She has cancelled all appt with me, is she moving to [place], then this prescription should be sent to [place]

## 2025-02-25 DIAGNOSIS — M54.42 CHRONIC MIDLINE LOW BACK PAIN WITH LEFT-SIDED SCIATICA: ICD-10-CM

## 2025-02-25 DIAGNOSIS — G43.709 CHRONIC MIGRAINE WITHOUT AURA WITHOUT STATUS MIGRAINOSUS, NOT INTRACTABLE: ICD-10-CM

## 2025-02-25 DIAGNOSIS — M19.90 GENERALIZED ARTHRITIS: ICD-10-CM

## 2025-02-25 DIAGNOSIS — G89.29 CHRONIC MIDLINE LOW BACK PAIN WITH LEFT-SIDED SCIATICA: ICD-10-CM

## 2025-02-25 DIAGNOSIS — F41.9 ANXIETY: ICD-10-CM

## 2025-02-25 DIAGNOSIS — K21.9 GERD WITHOUT ESOPHAGITIS: ICD-10-CM

## 2025-02-25 DIAGNOSIS — R11.0 NAUSEA: ICD-10-CM

## 2025-02-25 DIAGNOSIS — I10 HYPERTENSION, UNSPECIFIED TYPE: ICD-10-CM

## 2025-02-25 DIAGNOSIS — E66.9 OBESITY (BMI 35.0-39.9 WITHOUT COMORBIDITY): ICD-10-CM

## 2025-02-25 RX ORDER — OMEPRAZOLE 10 MG/1
10 CAPSULE, DELAYED RELEASE ORAL DAILY
Qty: 90 CAPSULE | Refills: 1 | OUTPATIENT
Start: 2025-02-25

## 2025-02-25 RX ORDER — DICLOFENAC SODIUM 75 MG/1
75 TABLET, DELAYED RELEASE ORAL 2 TIMES DAILY
Qty: 60 TABLET | Refills: 2 | OUTPATIENT
Start: 2025-02-25

## 2025-02-25 RX ORDER — SUMATRIPTAN SUCCINATE 100 MG/1
100 TABLET ORAL DAILY
Qty: 9 TABLET | Refills: 2 | OUTPATIENT
Start: 2025-02-25

## 2025-02-25 RX ORDER — METOPROLOL TARTRATE 25 MG/1
25 TABLET, FILM COATED ORAL 2 TIMES DAILY
Qty: 180 TABLET | Refills: 1 | OUTPATIENT
Start: 2025-02-25

## 2025-02-25 RX ORDER — AMITRIPTYLINE HYDROCHLORIDE 10 MG/1
10 TABLET ORAL NIGHTLY
Qty: 90 TABLET | Refills: 0 | OUTPATIENT
Start: 2025-02-25

## 2025-02-25 RX ORDER — BUPROPION HYDROCHLORIDE 300 MG/1
300 TABLET ORAL EVERY MORNING
Qty: 90 TABLET | Refills: 1 | OUTPATIENT
Start: 2025-02-25

## 2025-02-25 RX ORDER — DICYCLOMINE HYDROCHLORIDE 10 MG/1
10 CAPSULE ORAL 3 TIMES DAILY PRN
Qty: 270 CAPSULE | Refills: 1 | OUTPATIENT
Start: 2025-02-25

## 2025-02-25 NOTE — TELEPHONE ENCOUNTER
Jamaica Putnam called to request a refill on her medication.      Last office visit : 12/30/2024   Next office visit : Visit date not found     Requested Prescriptions     Pending Prescriptions Disp Refills    tirzepatide-weight management (ZEPBOUND) 2.5 MG/0.5ML SOAJ subCUTAneous auto-injector pen 2 mL 0     Sig: Inject 2.5 mg into the skin once a week    ondansetron (ZOFRAN) 4 MG tablet 30 tablet 0     Sig: Take 1 tablet by mouth 3 times daily as needed for Nausea or Vomiting            Jess Garzon MA

## 2025-02-27 RX ORDER — TIRZEPATIDE 2.5 MG/.5ML
2.5 INJECTION, SOLUTION SUBCUTANEOUS WEEKLY
Qty: 2 ML | Refills: 0 | Status: SHIPPED | OUTPATIENT
Start: 2025-02-27

## 2025-02-27 RX ORDER — ONDANSETRON 4 MG/1
4 TABLET, FILM COATED ORAL 3 TIMES DAILY PRN
Qty: 30 TABLET | Refills: 0 | Status: SHIPPED | OUTPATIENT
Start: 2025-02-27

## 2025-03-07 DIAGNOSIS — M19.90 GENERALIZED ARTHRITIS: ICD-10-CM

## 2025-03-07 RX ORDER — DICLOFENAC SODIUM 75 MG/1
75 TABLET, DELAYED RELEASE ORAL 2 TIMES DAILY
Qty: 60 TABLET | Refills: 2 | Status: SHIPPED | OUTPATIENT
Start: 2025-03-07

## 2025-03-12 ENCOUNTER — OFFICE VISIT (OUTPATIENT)
Dept: PRIMARY CARE CLINIC | Age: 40
End: 2025-03-12
Payer: COMMERCIAL

## 2025-03-12 VITALS
TEMPERATURE: 97.8 F | HEART RATE: 78 BPM | BODY MASS INDEX: 37.29 KG/M2 | SYSTOLIC BLOOD PRESSURE: 126 MMHG | OXYGEN SATURATION: 98 % | DIASTOLIC BLOOD PRESSURE: 78 MMHG | WEIGHT: 251.8 LBS | HEIGHT: 69 IN

## 2025-03-12 DIAGNOSIS — F41.9 ANXIETY: ICD-10-CM

## 2025-03-12 DIAGNOSIS — M54.42 CHRONIC MIDLINE LOW BACK PAIN WITH LEFT-SIDED SCIATICA: ICD-10-CM

## 2025-03-12 DIAGNOSIS — G89.29 CHRONIC MIDLINE LOW BACK PAIN WITH LEFT-SIDED SCIATICA: ICD-10-CM

## 2025-03-12 DIAGNOSIS — R11.0 NAUSEA: ICD-10-CM

## 2025-03-12 DIAGNOSIS — M19.90 GENERALIZED ARTHRITIS: ICD-10-CM

## 2025-03-12 DIAGNOSIS — R53.83 OTHER FATIGUE: Primary | ICD-10-CM

## 2025-03-12 DIAGNOSIS — I10 HYPERTENSION, UNSPECIFIED TYPE: ICD-10-CM

## 2025-03-12 DIAGNOSIS — G43.709 CHRONIC MIGRAINE WITHOUT AURA WITHOUT STATUS MIGRAINOSUS, NOT INTRACTABLE: ICD-10-CM

## 2025-03-12 DIAGNOSIS — E78.2 MIXED HYPERLIPIDEMIA: ICD-10-CM

## 2025-03-12 DIAGNOSIS — K21.9 GERD WITHOUT ESOPHAGITIS: ICD-10-CM

## 2025-03-12 DIAGNOSIS — J30.89 PERENNIAL ALLERGIC RHINITIS: ICD-10-CM

## 2025-03-12 PROCEDURE — 3078F DIAST BP <80 MM HG: CPT | Performed by: NURSE PRACTITIONER

## 2025-03-12 PROCEDURE — 3074F SYST BP LT 130 MM HG: CPT | Performed by: NURSE PRACTITIONER

## 2025-03-12 PROCEDURE — 99214 OFFICE O/P EST MOD 30 MIN: CPT | Performed by: NURSE PRACTITIONER

## 2025-03-12 PROCEDURE — G2211 COMPLEX E/M VISIT ADD ON: HCPCS | Performed by: NURSE PRACTITIONER

## 2025-03-12 RX ORDER — DICYCLOMINE HYDROCHLORIDE 10 MG/1
10 CAPSULE ORAL 3 TIMES DAILY PRN
Qty: 270 CAPSULE | Refills: 0 | Status: SHIPPED | OUTPATIENT
Start: 2025-03-12

## 2025-03-12 RX ORDER — DICLOFENAC SODIUM 75 MG/1
75 TABLET, DELAYED RELEASE ORAL 2 TIMES DAILY
Qty: 180 TABLET | Refills: 0 | Status: SHIPPED | OUTPATIENT
Start: 2025-03-12

## 2025-03-12 RX ORDER — BUPROPION HYDROCHLORIDE 300 MG/1
300 TABLET ORAL EVERY MORNING
Qty: 90 TABLET | Refills: 0 | Status: SHIPPED | OUTPATIENT
Start: 2025-03-12

## 2025-03-12 RX ORDER — METOPROLOL TARTRATE 25 MG/1
25 TABLET, FILM COATED ORAL 2 TIMES DAILY
Qty: 180 TABLET | Refills: 0 | Status: SHIPPED | OUTPATIENT
Start: 2025-03-12

## 2025-03-12 RX ORDER — OMEPRAZOLE 20 MG/1
20 CAPSULE, DELAYED RELEASE ORAL
Qty: 90 CAPSULE | Refills: 0 | Status: SHIPPED | OUTPATIENT
Start: 2025-03-12 | End: 2025-06-10

## 2025-03-12 RX ORDER — ONDANSETRON 4 MG/1
4 TABLET, FILM COATED ORAL 3 TIMES DAILY PRN
Qty: 30 TABLET | Refills: 0 | Status: SHIPPED | OUTPATIENT
Start: 2025-03-12

## 2025-03-12 RX ORDER — AMITRIPTYLINE HYDROCHLORIDE 10 MG/1
10 TABLET ORAL NIGHTLY
Qty: 90 TABLET | Refills: 0 | Status: SHIPPED | OUTPATIENT
Start: 2025-03-12

## 2025-03-12 RX ORDER — SUMATRIPTAN SUCCINATE 100 MG/1
100 TABLET ORAL DAILY
Qty: 27 TABLET | Refills: 0 | Status: SHIPPED | OUTPATIENT
Start: 2025-03-12

## 2025-03-12 RX ORDER — MONTELUKAST SODIUM 10 MG/1
10 TABLET ORAL NIGHTLY
Qty: 90 TABLET | Refills: 0 | Status: SHIPPED | OUTPATIENT
Start: 2025-03-12

## 2025-03-12 SDOH — ECONOMIC STABILITY: FOOD INSECURITY: WITHIN THE PAST 12 MONTHS, THE FOOD YOU BOUGHT JUST DIDN'T LAST AND YOU DIDN'T HAVE MONEY TO GET MORE.: NEVER TRUE

## 2025-03-12 SDOH — ECONOMIC STABILITY: FOOD INSECURITY: WITHIN THE PAST 12 MONTHS, YOU WORRIED THAT YOUR FOOD WOULD RUN OUT BEFORE YOU GOT MONEY TO BUY MORE.: NEVER TRUE

## 2025-03-12 ASSESSMENT — PATIENT HEALTH QUESTIONNAIRE - PHQ9
1. LITTLE INTEREST OR PLEASURE IN DOING THINGS: NOT AT ALL
SUM OF ALL RESPONSES TO PHQ QUESTIONS 1-9: 0
2. FEELING DOWN, DEPRESSED OR HOPELESS: NOT AT ALL
SUM OF ALL RESPONSES TO PHQ QUESTIONS 1-9: 0

## 2025-03-18 ASSESSMENT — ENCOUNTER SYMPTOMS
ABDOMINAL PAIN: 0
DIARRHEA: 0
COUGH: 0
COLOR CHANGE: 0
NAUSEA: 0
SHORTNESS OF BREATH: 0
SORE THROAT: 0
VOMITING: 0
CHEST TIGHTNESS: 0

## 2025-03-18 NOTE — PROGRESS NOTES
prior meds, diet, and exercise plans as instructed. Patient agrees to follow up as instructed and sooner if needed.  Patient agrees to go to ER if condition becomes emergent.      EMR Dragon/transcription disclaimer: Some of this encounter note is an electronic transcription/translation of spoken language to printed text. The electronic translation of spoken language may permit erroneous, or at times, nonsensical words or phrases to be inadvertently transcribed. Although I have reviewed the note for such errors, some may still exist.    Electronically signed by ZEESHAN Flowers CNP on 3/18/2025 at 3:50 PM

## 2025-03-29 DIAGNOSIS — G89.29 CHRONIC MIDLINE LOW BACK PAIN WITH LEFT-SIDED SCIATICA: ICD-10-CM

## 2025-03-29 DIAGNOSIS — M54.42 CHRONIC MIDLINE LOW BACK PAIN WITH LEFT-SIDED SCIATICA: ICD-10-CM

## 2025-03-31 NOTE — TELEPHONE ENCOUNTER
Jamaica Putnam called to request a refill on her medication.      Last office visit : 3/12/2025   Next office visit : 6/12/2025     Requested Prescriptions     Pending Prescriptions Disp Refills    cyclobenzaprine (FLEXERIL) 10 MG tablet [Pharmacy Med Name: CYCLOBENZAPRINE 10 MG TABLET] 180 tablet 1     Sig: TAKE 1 TABLET BY MOUTH 2 TIMES DAILY AS NEEDED FOR MUSCLE SPASMS.            Yoly Izaguirre MA

## 2025-04-01 RX ORDER — CYCLOBENZAPRINE HCL 10 MG
10 TABLET ORAL 2 TIMES DAILY PRN
Qty: 180 TABLET | Refills: 1 | Status: SHIPPED | OUTPATIENT
Start: 2025-04-01 | End: 2025-09-28

## 2025-04-13 DIAGNOSIS — Z31.9 INFERTILITY MANAGEMENT: ICD-10-CM

## 2025-05-04 ENCOUNTER — OFFICE VISIT (OUTPATIENT)
Age: 40
End: 2025-05-04

## 2025-05-04 VITALS
OXYGEN SATURATION: 97 % | HEIGHT: 69 IN | RESPIRATION RATE: 20 BRPM | BODY MASS INDEX: 36.14 KG/M2 | HEART RATE: 94 BPM | WEIGHT: 244 LBS | SYSTOLIC BLOOD PRESSURE: 122 MMHG | TEMPERATURE: 97.5 F | DIASTOLIC BLOOD PRESSURE: 78 MMHG

## 2025-05-04 DIAGNOSIS — M54.42 ACUTE LEFT-SIDED LOW BACK PAIN WITH LEFT-SIDED SCIATICA: ICD-10-CM

## 2025-05-04 DIAGNOSIS — B02.9 HERPES ZOSTER WITHOUT COMPLICATION: Primary | ICD-10-CM

## 2025-05-04 RX ORDER — METHYLPREDNISOLONE 4 MG/1
TABLET ORAL
Qty: 1 KIT | Refills: 0 | Status: SHIPPED | OUTPATIENT
Start: 2025-05-04

## 2025-05-04 RX ORDER — TRIAMCINOLONE ACETONIDE 1 MG/G
CREAM TOPICAL
Qty: 30 G | Refills: 0 | Status: CANCELLED | OUTPATIENT
Start: 2025-05-04

## 2025-05-04 RX ORDER — DEXAMETHASONE SODIUM PHOSPHATE 10 MG/ML
10 INJECTION, SOLUTION INTRA-ARTICULAR; INTRALESIONAL; INTRAMUSCULAR; INTRAVENOUS; SOFT TISSUE ONCE
Status: COMPLETED | OUTPATIENT
Start: 2025-05-04 | End: 2025-05-04

## 2025-05-04 RX ORDER — VALACYCLOVIR HYDROCHLORIDE 1 G/1
1000 TABLET, FILM COATED ORAL 3 TIMES DAILY
Qty: 21 TABLET | Refills: 0 | Status: SHIPPED | OUTPATIENT
Start: 2025-05-04 | End: 2025-05-11

## 2025-05-04 RX ADMIN — DEXAMETHASONE SODIUM PHOSPHATE 10 MG: 10 INJECTION, SOLUTION INTRA-ARTICULAR; INTRALESIONAL; INTRAMUSCULAR; INTRAVENOUS; SOFT TISSUE at 14:57

## 2025-05-16 DIAGNOSIS — G89.29 CHRONIC MIDLINE LOW BACK PAIN WITH LEFT-SIDED SCIATICA: ICD-10-CM

## 2025-05-16 DIAGNOSIS — I10 HYPERTENSION, UNSPECIFIED TYPE: ICD-10-CM

## 2025-05-16 DIAGNOSIS — F41.9 ANXIETY: ICD-10-CM

## 2025-05-16 DIAGNOSIS — E78.2 MIXED HYPERLIPIDEMIA: ICD-10-CM

## 2025-05-16 DIAGNOSIS — M54.42 CHRONIC MIDLINE LOW BACK PAIN WITH LEFT-SIDED SCIATICA: ICD-10-CM

## 2025-05-16 RX ORDER — BUPROPION HYDROCHLORIDE 150 MG/1
150 TABLET ORAL EVERY MORNING
Qty: 30 TABLET | Refills: 5 | Status: SHIPPED | OUTPATIENT
Start: 2025-05-16

## 2025-05-16 NOTE — TELEPHONE ENCOUNTER
Jamaica Putnam called to request a refill on her medication.      Last office visit : 3/12/2025   Next office visit : 5/16/2025     Requested Prescriptions     Pending Prescriptions Disp Refills    buPROPion (WELLBUTRIN XL) 150 MG extended release tablet [Pharmacy Med Name: BUPROPION HCL  MG TABLET] 30 tablet 5     Sig: TAKE 1 TABLET BY MOUTH EVERY DAY IN THE MORNING            Jess Garzon MA

## 2025-05-16 NOTE — TELEPHONE ENCOUNTER
Jamaica Putnam called to request a refill on her medication.      Last office visit : 3/12/2025   Next office visit : 6/12/2025     Requested Prescriptions     Pending Prescriptions Disp Refills    Tirzepatide (MOUNJARO) 5 MG/0.5ML SOAJ pen 2 mL 0     Sig: Inject 5 mg into the skin every 7 days            Jess Garzon MA

## 2025-05-20 DIAGNOSIS — E78.2 MIXED HYPERLIPIDEMIA: ICD-10-CM

## 2025-05-20 DIAGNOSIS — E66.9 OBESITY (BMI 35.0-39.9 WITHOUT COMORBIDITY): ICD-10-CM

## 2025-05-20 DIAGNOSIS — I10 HYPERTENSION, UNSPECIFIED TYPE: Primary | ICD-10-CM

## 2025-06-10 DIAGNOSIS — F41.9 ANXIETY: ICD-10-CM

## 2025-06-10 NOTE — TELEPHONE ENCOUNTER
Jamaica Putnam called to request a refill on her medication.      Last office visit : 3/12/2025   Next office visit : 6/12/2025     Requested Prescriptions     Pending Prescriptions Disp Refills    buPROPion (WELLBUTRIN XL) 150 MG extended release tablet [Pharmacy Med Name: BUPROPION HCL  MG TABLET] 90 tablet 0     Sig: TAKE 1 TABLET BY MOUTH EVERY DAY IN THE MORNING            Jess Garzon MA

## 2025-06-11 RX ORDER — BUPROPION HYDROCHLORIDE 150 MG/1
150 TABLET ORAL EVERY MORNING
Qty: 90 TABLET | Refills: 0 | Status: SHIPPED | OUTPATIENT
Start: 2025-06-11

## 2025-06-12 ENCOUNTER — RESULTS FOLLOW-UP (OUTPATIENT)
Dept: PRIMARY CARE CLINIC | Age: 40
End: 2025-06-12

## 2025-06-12 ENCOUNTER — OFFICE VISIT (OUTPATIENT)
Dept: PRIMARY CARE CLINIC | Age: 40
End: 2025-06-12

## 2025-06-12 VITALS
HEART RATE: 67 BPM | TEMPERATURE: 97 F | WEIGHT: 237 LBS | DIASTOLIC BLOOD PRESSURE: 78 MMHG | SYSTOLIC BLOOD PRESSURE: 120 MMHG | OXYGEN SATURATION: 98 % | BODY MASS INDEX: 35.1 KG/M2 | HEIGHT: 69 IN

## 2025-06-12 DIAGNOSIS — E78.2 MIXED HYPERLIPIDEMIA: ICD-10-CM

## 2025-06-12 DIAGNOSIS — Z91.09 ENVIRONMENTAL ALLERGIES: ICD-10-CM

## 2025-06-12 DIAGNOSIS — R30.0 DYSURIA: Primary | ICD-10-CM

## 2025-06-12 DIAGNOSIS — R53.83 OTHER FATIGUE: ICD-10-CM

## 2025-06-12 DIAGNOSIS — R30.0 DYSURIA: ICD-10-CM

## 2025-06-12 DIAGNOSIS — Z00.00 WELL ADULT EXAM: ICD-10-CM

## 2025-06-12 DIAGNOSIS — M54.50 LEFT-SIDED LOW BACK PAIN WITHOUT SCIATICA, UNSPECIFIED CHRONICITY: ICD-10-CM

## 2025-06-12 LAB
25(OH)D3 SERPL-MCNC: 35.8 NG/ML
ALBUMIN SERPL-MCNC: 4.4 G/DL (ref 3.5–5.2)
ALP SERPL-CCNC: 78 U/L (ref 35–104)
ALT SERPL-CCNC: 16 U/L (ref 10–35)
ANION GAP SERPL CALCULATED.3IONS-SCNC: 12 MMOL/L (ref 8–16)
APPEARANCE FLUID: CLEAR
AST SERPL-CCNC: 17 U/L (ref 10–35)
BASOPHILS # BLD: 0.1 K/UL (ref 0–0.2)
BASOPHILS NFR BLD: 0.7 % (ref 0–1)
BILIRUB SERPL-MCNC: 0.3 MG/DL (ref 0.2–1.2)
BILIRUBIN, POC: NORMAL
BLOOD URINE, POC: NORMAL
BUN SERPL-MCNC: 5 MG/DL (ref 6–20)
CALCIUM SERPL-MCNC: 9.4 MG/DL (ref 8.6–10)
CHLORIDE SERPL-SCNC: 101 MMOL/L (ref 98–107)
CHOLEST SERPL-MCNC: 294 MG/DL (ref 0–199)
CLARITY, POC: CLEAR
CO2 SERPL-SCNC: 24 MMOL/L (ref 22–29)
COLOR, POC: YELLOW
CREAT SERPL-MCNC: 0.8 MG/DL (ref 0.5–0.9)
EOSINOPHIL # BLD: 0.2 K/UL (ref 0–0.6)
EOSINOPHIL NFR BLD: 3.5 % (ref 0–5)
ERYTHROCYTE [DISTWIDTH] IN BLOOD BY AUTOMATED COUNT: 12.8 % (ref 11.5–14.5)
GLUCOSE SERPL-MCNC: 81 MG/DL (ref 70–99)
GLUCOSE URINE, POC: NORMAL MG/DL
HCT VFR BLD AUTO: 39.5 % (ref 37–47)
HDLC SERPL-MCNC: 35 MG/DL (ref 40–60)
HGB BLD-MCNC: 12.4 G/DL (ref 12–16)
IMM GRANULOCYTES # BLD: 0 K/UL
KETONES, POC: NORMAL MG/DL
LDLC SERPL CALC-MCNC: 221 MG/DL
LEUKOCYTE EST, POC: NORMAL
LYMPHOCYTES # BLD: 2.3 K/UL (ref 1.1–4.5)
LYMPHOCYTES NFR BLD: 33.6 % (ref 20–40)
MCH RBC QN AUTO: 29 PG (ref 27–31)
MCHC RBC AUTO-ENTMCNC: 31.4 G/DL (ref 33–37)
MCV RBC AUTO: 92.5 FL (ref 81–99)
MONOCYTES # BLD: 0.4 K/UL (ref 0–0.9)
MONOCYTES NFR BLD: 5.1 % (ref 0–10)
NEUTROPHILS # BLD: 3.9 K/UL (ref 1.5–7.5)
NEUTS SEG NFR BLD: 56.8 % (ref 50–65)
NITRITE, POC: NORMAL
PH, POC: 7
PLATELET # BLD AUTO: 235 K/UL (ref 130–400)
PMV BLD AUTO: 11 FL (ref 9.4–12.3)
POTASSIUM SERPL-SCNC: 4.3 MMOL/L (ref 3.5–5.1)
PROT SERPL-MCNC: 7.4 G/DL (ref 6.4–8.3)
PROTEIN, POC: NORMAL MG/DL
RBC # BLD AUTO: 4.27 M/UL (ref 4.2–5.4)
SODIUM SERPL-SCNC: 137 MMOL/L (ref 136–145)
SPECIFIC GRAVITY, POC: 1.01
TRIGL SERPL-MCNC: 191 MG/DL (ref 0–149)
TSH SERPL DL<=0.005 MIU/L-ACNC: 2.76 UIU/ML (ref 0.27–4.2)
UROBILINOGEN, POC: 0.2 MG/DL
WBC # BLD AUTO: 6.8 K/UL (ref 4.8–10.8)

## 2025-06-12 RX ORDER — MINOCYCLINE HYDROCHLORIDE 100 MG/1
100 CAPSULE ORAL 2 TIMES DAILY
Qty: 180 CAPSULE | Refills: 1 | Status: SHIPPED | OUTPATIENT
Start: 2025-06-12

## 2025-06-14 LAB — BACTERIA UR CULT: NORMAL

## 2025-06-18 NOTE — TELEPHONE ENCOUNTER
Patient called regarding her lab results. Reviewed results and recommendation with the patient and she voiced understanding.

## 2025-06-20 ENCOUNTER — RESULTS FOLLOW-UP (OUTPATIENT)
Dept: PRIMARY CARE CLINIC | Age: 40
End: 2025-06-20

## 2025-06-20 ASSESSMENT — ENCOUNTER SYMPTOMS
SHORTNESS OF BREATH: 0
BACK PAIN: 1
COUGH: 0
CHEST TIGHTNESS: 0
NAUSEA: 0
VOMITING: 0
CONSTIPATION: 1
DIARRHEA: 0
COLOR CHANGE: 0
ABDOMINAL PAIN: 0
SORE THROAT: 0

## 2025-06-20 NOTE — PROGRESS NOTES
Ms.Breanne SAKINA Putnam is a 39 y.o. female who presents today for  Chief Complaint   Patient presents with    3 Month Follow-Up       HPI:  History of Present Illness  The patient presents for evaluation of shingles, weight management, constipation, rosacea, suspected urinary tract infection, throat pain, allergies, back pain, and mole on the leg.    She reports a rash that initially appeared on her leg approximately a month ago, which subsequently spread to the sole of her foot and her back. The rash was localized to one side of her body. Her mother suspected it to be shingles, leading her to seek medical attention at an urgent care facility where the diagnosis was confirmed. She was prescribed an antiviral medication. She recalls experiencing significant stress towards the end of the school year, which she believes may have contributed to the onset of the rash. Currently, she reports no pain associated with the rash.    She has been experiencing weight loss, as evidenced by her loose-fitting pants. She initiated Zepbound injections in either February or March 2025, with a starting weight of 261 pounds. As of 01/04/2025, her weight was 259 pounds, and it has since decreased to 237 pounds. She reports feeling well overall, with the exception of constipation. She is currently on a 5 mg dose of Zepbound and has expressed interest in increasing the dosage to 7.5 mg. She also mentions receiving a letter from her insurance company indicating that Zepbound will no longer be covered.    She has been experiencing constipation. She typically experiences diarrhea when stressed.    She had previously consulted a dermatologist due to redness on her cheeks that developed during the COVID-19 pandemic. She was diagnosed with rosacea and prescribed a topical cream, which she reports as ineffective. She has not been prescribed any oral antibiotics for this condition. She also reports redness in her ear.    She suspects she may have a

## 2025-06-24 ENCOUNTER — TELEPHONE (OUTPATIENT)
Dept: PRIMARY CARE CLINIC | Age: 40
End: 2025-06-24

## 2025-06-25 DIAGNOSIS — E66.9 OBESITY (BMI 35.0-39.9 WITHOUT COMORBIDITY): ICD-10-CM

## 2025-06-25 DIAGNOSIS — E78.2 MIXED HYPERLIPIDEMIA: Primary | ICD-10-CM

## 2025-07-05 DIAGNOSIS — R11.0 NAUSEA: ICD-10-CM

## 2025-07-06 RX ORDER — ONDANSETRON 4 MG/1
4 TABLET, FILM COATED ORAL 3 TIMES DAILY PRN
Qty: 30 TABLET | Refills: 0 | Status: SHIPPED | OUTPATIENT
Start: 2025-07-06

## 2025-07-16 DIAGNOSIS — Z31.9 INFERTILITY MANAGEMENT: ICD-10-CM

## 2025-07-16 DIAGNOSIS — G43.709 CHRONIC MIGRAINE WITHOUT AURA WITHOUT STATUS MIGRAINOSUS, NOT INTRACTABLE: ICD-10-CM

## 2025-07-16 DIAGNOSIS — I10 HYPERTENSION, UNSPECIFIED TYPE: ICD-10-CM

## 2025-07-16 RX ORDER — METOPROLOL TARTRATE 25 MG/1
25 TABLET, FILM COATED ORAL 2 TIMES DAILY
Qty: 180 TABLET | Refills: 0 | Status: SHIPPED | OUTPATIENT
Start: 2025-07-16

## 2025-07-16 NOTE — TELEPHONE ENCOUNTER
Jamaica Putnam called to request a refill on her medication.      Last office visit : 6/12/2025   Next office visit : 9/12/2025     Requested Prescriptions     Pending Prescriptions Disp Refills    metoprolol tartrate (LOPRESSOR) 25 MG tablet [Pharmacy Med Name: METOPROLOL TARTRATE 25 MG TAB] 180 tablet 0     Sig: TAKE 1 TABLET BY MOUTH TWICE A DAY            Darlene Gutierrez

## 2025-07-21 ENCOUNTER — PATIENT MESSAGE (OUTPATIENT)
Dept: PRIMARY CARE CLINIC | Age: 40
End: 2025-07-21

## 2025-07-29 NOTE — TELEPHONE ENCOUNTER
Jamaica Putnam called to request a refill on her medication.      Last office visit : 6/12/2025   Next office visit : 9/12/2025     Requested Prescriptions     Pending Prescriptions Disp Refills    dicyclomine (BENTYL) 10 MG capsule [Pharmacy Med Name: DICYCLOMINE 10 MG CAPSULE] 270 capsule 0     Sig: TAKE 1 CAPSULE BY MOUTH 3 TIMES DAILY AS NEEDED (ABDOMINAL CRAMPING)            Jess Garzon MA

## 2025-07-30 RX ORDER — DICYCLOMINE HYDROCHLORIDE 10 MG/1
10 CAPSULE ORAL 3 TIMES DAILY PRN
Qty: 270 CAPSULE | Refills: 0 | Status: SHIPPED | OUTPATIENT
Start: 2025-07-30

## 2025-08-08 DIAGNOSIS — E78.2 MIXED HYPERLIPIDEMIA: ICD-10-CM

## 2025-08-08 DIAGNOSIS — R11.0 NAUSEA: ICD-10-CM

## 2025-08-08 DIAGNOSIS — I10 PRIMARY HYPERTENSION: ICD-10-CM

## 2025-08-08 DIAGNOSIS — E66.9 OBESITY (BMI 35.0-39.9 WITHOUT COMORBIDITY): Primary | ICD-10-CM

## 2025-08-12 DIAGNOSIS — I10 PRIMARY HYPERTENSION: ICD-10-CM

## 2025-08-12 DIAGNOSIS — E66.9 OBESITY (BMI 35.0-39.9 WITHOUT COMORBIDITY): ICD-10-CM

## 2025-08-12 DIAGNOSIS — E78.2 MIXED HYPERLIPIDEMIA: ICD-10-CM

## 2025-08-12 RX ORDER — ONDANSETRON 4 MG/1
4 TABLET, FILM COATED ORAL 3 TIMES DAILY PRN
Qty: 30 TABLET | Refills: 0 | Status: SHIPPED | OUTPATIENT
Start: 2025-08-12

## 2025-08-13 DIAGNOSIS — M54.42 CHRONIC MIDLINE LOW BACK PAIN WITH LEFT-SIDED SCIATICA: ICD-10-CM

## 2025-08-13 DIAGNOSIS — G89.29 CHRONIC MIDLINE LOW BACK PAIN WITH LEFT-SIDED SCIATICA: ICD-10-CM

## 2025-08-13 RX ORDER — SEMAGLUTIDE 0.25 MG/.5ML
0.25 INJECTION, SOLUTION SUBCUTANEOUS
Qty: 2 ML | Refills: 0 | Status: SHIPPED | OUTPATIENT
Start: 2025-08-13 | End: 2025-09-12

## 2025-08-13 RX ORDER — AMITRIPTYLINE HYDROCHLORIDE 10 MG/1
10 TABLET ORAL NIGHTLY
Qty: 90 TABLET | Refills: 0 | Status: SHIPPED | OUTPATIENT
Start: 2025-08-13

## 2025-08-19 ENCOUNTER — OFFICE VISIT (OUTPATIENT)
Age: 40
End: 2025-08-19
Payer: COMMERCIAL

## 2025-08-19 VITALS
SYSTOLIC BLOOD PRESSURE: 116 MMHG | DIASTOLIC BLOOD PRESSURE: 80 MMHG | BODY MASS INDEX: 34.21 KG/M2 | WEIGHT: 231 LBS | HEIGHT: 69 IN

## 2025-08-19 DIAGNOSIS — F41.9 ANXIETY: ICD-10-CM

## 2025-08-19 DIAGNOSIS — J30.89 PERENNIAL ALLERGIC RHINITIS: ICD-10-CM

## 2025-08-19 DIAGNOSIS — N92.0 MENORRHAGIA WITH REGULAR CYCLE: ICD-10-CM

## 2025-08-19 DIAGNOSIS — Z31.9 PATIENT DESIRES PREGNANCY: Primary | ICD-10-CM

## 2025-08-19 PROCEDURE — 99203 OFFICE O/P NEW LOW 30 MIN: CPT

## 2025-08-19 RX ORDER — BUPROPION HYDROCHLORIDE 300 MG/1
300 TABLET ORAL EVERY MORNING
COMMUNITY

## 2025-08-19 RX ORDER — OMEPRAZOLE 20 MG/1
20 CAPSULE, DELAYED RELEASE ORAL
COMMUNITY
Start: 2025-03-12

## 2025-08-19 RX ORDER — MONTELUKAST SODIUM 10 MG/1
10 TABLET ORAL NIGHTLY
Qty: 90 TABLET | Refills: 1 | Status: SHIPPED | OUTPATIENT
Start: 2025-08-19

## 2025-08-19 RX ORDER — DICLOFENAC SODIUM 75 MG/1
75 TABLET, DELAYED RELEASE ORAL 2 TIMES DAILY
COMMUNITY
Start: 2025-03-12

## 2025-08-19 RX ORDER — SEMAGLUTIDE 0.25 MG/.5ML
0.25 INJECTION, SOLUTION SUBCUTANEOUS
COMMUNITY
Start: 2025-06-25 | End: 2025-09-13

## 2025-08-19 RX ORDER — METOPROLOL TARTRATE 25 MG/1
25 TABLET, FILM COATED ORAL 2 TIMES DAILY
COMMUNITY
Start: 2025-07-16

## 2025-08-19 RX ORDER — SUMATRIPTAN SUCCINATE 100 MG/1
100 TABLET ORAL
COMMUNITY
Start: 2025-03-12

## 2025-08-19 RX ORDER — CYCLOBENZAPRINE HCL 10 MG
10 TABLET ORAL
COMMUNITY
Start: 2025-04-01 | End: 2025-09-29

## 2025-08-19 RX ORDER — MONTELUKAST SODIUM 10 MG/1
10 TABLET ORAL
COMMUNITY

## 2025-08-19 RX ORDER — BUPROPION HYDROCHLORIDE 300 MG/1
300 TABLET ORAL EVERY MORNING
Qty: 90 TABLET | Refills: 1 | Status: SHIPPED | OUTPATIENT
Start: 2025-08-19

## 2025-08-19 RX ORDER — DICYCLOMINE HYDROCHLORIDE 10 MG/1
10 CAPSULE ORAL
COMMUNITY
Start: 2025-07-30

## 2025-08-19 RX ORDER — AMITRIPTYLINE HYDROCHLORIDE 10 MG/1
10 TABLET ORAL
COMMUNITY
Start: 2025-08-13

## 2025-08-31 DIAGNOSIS — R11.0 NAUSEA: ICD-10-CM

## 2025-09-02 RX ORDER — ONDANSETRON 4 MG/1
4 TABLET, FILM COATED ORAL 3 TIMES DAILY PRN
Qty: 18 TABLET | Refills: 0 | Status: SHIPPED | OUTPATIENT
Start: 2025-09-02